# Patient Record
Sex: MALE | Race: WHITE | NOT HISPANIC OR LATINO | Employment: FULL TIME | URBAN - METROPOLITAN AREA
[De-identification: names, ages, dates, MRNs, and addresses within clinical notes are randomized per-mention and may not be internally consistent; named-entity substitution may affect disease eponyms.]

---

## 2017-02-25 ENCOUNTER — HOSPITAL ENCOUNTER (INPATIENT)
Facility: HOSPITAL | Age: 32
LOS: 2 days | Discharge: HOME | End: 2017-02-27
Attending: EMERGENCY MEDICINE | Admitting: INTERNAL MEDICINE

## 2017-02-25 ENCOUNTER — APPOINTMENT (OUTPATIENT)
Dept: GENERAL RADIOLOGY | Facility: HOSPITAL | Age: 32
End: 2017-02-25

## 2017-02-25 ENCOUNTER — APPOINTMENT (OUTPATIENT)
Dept: CT IMAGING | Facility: HOSPITAL | Age: 32
End: 2017-02-25

## 2017-02-25 DIAGNOSIS — J98.59 MEDIASTINAL MASS: ICD-10-CM

## 2017-02-25 DIAGNOSIS — C83.32 DIFFUSE LARGE B-CELL LYMPHOMA OF INTRATHORACIC LYMPH NODES (HCC): ICD-10-CM

## 2017-02-25 DIAGNOSIS — I87.1 SVC SYNDROME: Primary | ICD-10-CM

## 2017-02-25 LAB
ALBUMIN SERPL-MCNC: 4.7 G/DL (ref 3.5–5.2)
ALBUMIN/GLOB SERPL: 1.5 G/DL
ALP SERPL-CCNC: 52 U/L (ref 39–117)
ALT SERPL W P-5'-P-CCNC: 30 U/L (ref 1–41)
ANION GAP SERPL CALCULATED.3IONS-SCNC: 16.7 MMOL/L
AST SERPL-CCNC: 18 U/L (ref 1–40)
BASOPHILS # BLD AUTO: 0.03 10*3/MM3 (ref 0–0.2)
BASOPHILS NFR BLD AUTO: 0.5 % (ref 0–1.5)
BILIRUB SERPL-MCNC: 0.6 MG/DL (ref 0.1–1.2)
BUN BLD-MCNC: 14 MG/DL (ref 6–20)
BUN/CREAT SERPL: 14 (ref 7–25)
CALCIUM SPEC-SCNC: 9.9 MG/DL (ref 8.6–10.5)
CHLORIDE SERPL-SCNC: 100 MMOL/L (ref 98–107)
CO2 SERPL-SCNC: 24.3 MMOL/L (ref 22–29)
CREAT BLD-MCNC: 1 MG/DL (ref 0.76–1.27)
DEPRECATED RDW RBC AUTO: 39.7 FL (ref 37–54)
EOSINOPHIL # BLD AUTO: 0.04 10*3/MM3 (ref 0–0.7)
EOSINOPHIL NFR BLD AUTO: 0.6 % (ref 0.3–6.2)
ERYTHROCYTE [DISTWIDTH] IN BLOOD BY AUTOMATED COUNT: 13.2 % (ref 11.5–14.5)
GFR SERPL CREATININE-BSD FRML MDRD: 87 ML/MIN/1.73
GLOBULIN UR ELPH-MCNC: 3.1 GM/DL
GLUCOSE BLD-MCNC: 84 MG/DL (ref 65–99)
HCT VFR BLD AUTO: 44.8 % (ref 40.4–52.2)
HGB BLD-MCNC: 15.7 G/DL (ref 13.7–17.6)
IMM GRANULOCYTES # BLD: 0.02 10*3/MM3 (ref 0–0.03)
IMM GRANULOCYTES NFR BLD: 0.3 % (ref 0–0.5)
LYMPHOCYTES # BLD AUTO: 1.1 10*3/MM3 (ref 0.9–4.8)
LYMPHOCYTES NFR BLD AUTO: 17.5 % (ref 19.6–45.3)
MCH RBC QN AUTO: 29.4 PG (ref 27–32.7)
MCHC RBC AUTO-ENTMCNC: 35 G/DL (ref 32.6–36.4)
MCV RBC AUTO: 83.9 FL (ref 79.8–96.2)
MONOCYTES # BLD AUTO: 0.39 10*3/MM3 (ref 0.2–1.2)
MONOCYTES NFR BLD AUTO: 6.2 % (ref 5–12)
NEUTROPHILS # BLD AUTO: 4.7 10*3/MM3 (ref 1.9–8.1)
NEUTROPHILS NFR BLD AUTO: 74.9 % (ref 42.7–76)
PLATELET # BLD AUTO: 164 10*3/MM3 (ref 140–500)
PMV BLD AUTO: 9.9 FL (ref 6–12)
POTASSIUM BLD-SCNC: 4 MMOL/L (ref 3.5–5.2)
PROT SERPL-MCNC: 7.8 G/DL (ref 6–8.5)
RBC # BLD AUTO: 5.34 10*6/MM3 (ref 4.6–6)
SODIUM BLD-SCNC: 141 MMOL/L (ref 136–145)
WBC NRBC COR # BLD: 6.28 10*3/MM3 (ref 4.5–10.7)

## 2017-02-25 PROCEDURE — 70491 CT SOFT TISSUE NECK W/DYE: CPT

## 2017-02-25 PROCEDURE — 99283 EMERGENCY DEPT VISIT LOW MDM: CPT

## 2017-02-25 PROCEDURE — 71260 CT THORAX DX C+: CPT

## 2017-02-25 PROCEDURE — 71020 HC CHEST PA AND LATERAL: CPT

## 2017-02-25 PROCEDURE — 80053 COMPREHEN METABOLIC PANEL: CPT | Performed by: EMERGENCY MEDICINE

## 2017-02-25 PROCEDURE — 85025 COMPLETE CBC W/AUTO DIFF WBC: CPT | Performed by: EMERGENCY MEDICINE

## 2017-02-25 PROCEDURE — 36415 COLL VENOUS BLD VENIPUNCTURE: CPT | Performed by: EMERGENCY MEDICINE

## 2017-02-25 PROCEDURE — 0 IOPAMIDOL 61 % SOLUTION: Performed by: EMERGENCY MEDICINE

## 2017-02-25 RX ADMIN — IOPAMIDOL 85 ML: 612 INJECTION, SOLUTION INTRAVENOUS at 23:00

## 2017-02-26 PROBLEM — C83.30 DIFFUSE LARGE B CELL LYMPHOMA (HCC): Status: ACTIVE | Noted: 2017-02-26

## 2017-02-26 LAB
ANION GAP SERPL CALCULATED.3IONS-SCNC: 13.5 MMOL/L
BASOPHILS # BLD AUTO: 0.03 10*3/MM3 (ref 0–0.2)
BASOPHILS NFR BLD AUTO: 0.5 % (ref 0–1.5)
BUN BLD-MCNC: 15 MG/DL (ref 6–20)
BUN/CREAT SERPL: 13.5 (ref 7–25)
CALCIUM SPEC-SCNC: 9.4 MG/DL (ref 8.6–10.5)
CHLORIDE SERPL-SCNC: 101 MMOL/L (ref 98–107)
CO2 SERPL-SCNC: 23.5 MMOL/L (ref 22–29)
CREAT BLD-MCNC: 1.11 MG/DL (ref 0.76–1.27)
CRP SERPL-MCNC: 0.76 MG/DL (ref 0–0.5)
DEPRECATED RDW RBC AUTO: 40.5 FL (ref 37–54)
EOSINOPHIL # BLD AUTO: 0.07 10*3/MM3 (ref 0–0.7)
EOSINOPHIL NFR BLD AUTO: 1.2 % (ref 0.3–6.2)
ERYTHROCYTE [DISTWIDTH] IN BLOOD BY AUTOMATED COUNT: 13.5 % (ref 11.5–14.5)
GFR SERPL CREATININE-BSD FRML MDRD: 77 ML/MIN/1.73
GLUCOSE BLD-MCNC: 91 MG/DL (ref 65–99)
HCT VFR BLD AUTO: 45.4 % (ref 40.4–52.2)
HGB BLD-MCNC: 15.8 G/DL (ref 13.7–17.6)
IMM GRANULOCYTES # BLD: 0.02 10*3/MM3 (ref 0–0.03)
IMM GRANULOCYTES NFR BLD: 0.3 % (ref 0–0.5)
LDH SERPL-CCNC: 192 U/L (ref 135–225)
LYMPHOCYTES # BLD AUTO: 1.48 10*3/MM3 (ref 0.9–4.8)
LYMPHOCYTES NFR BLD AUTO: 24.4 % (ref 19.6–45.3)
MCH RBC QN AUTO: 29.2 PG (ref 27–32.7)
MCHC RBC AUTO-ENTMCNC: 34.8 G/DL (ref 32.6–36.4)
MCV RBC AUTO: 83.8 FL (ref 79.8–96.2)
MONOCYTES # BLD AUTO: 0.45 10*3/MM3 (ref 0.2–1.2)
MONOCYTES NFR BLD AUTO: 7.4 % (ref 5–12)
NEUTROPHILS # BLD AUTO: 4.02 10*3/MM3 (ref 1.9–8.1)
NEUTROPHILS NFR BLD AUTO: 66.2 % (ref 42.7–76)
PLATELET # BLD AUTO: 164 10*3/MM3 (ref 140–500)
PMV BLD AUTO: 9.9 FL (ref 6–12)
POTASSIUM BLD-SCNC: 3.9 MMOL/L (ref 3.5–5.2)
RBC # BLD AUTO: 5.42 10*6/MM3 (ref 4.6–6)
SODIUM BLD-SCNC: 138 MMOL/L (ref 136–145)
URATE SERPL-MCNC: 6.6 MG/DL (ref 3.4–7)
WBC NRBC COR # BLD: 6.07 10*3/MM3 (ref 4.5–10.7)

## 2017-02-26 PROCEDURE — 80048 BASIC METABOLIC PNL TOTAL CA: CPT | Performed by: INTERNAL MEDICINE

## 2017-02-26 PROCEDURE — 99255 IP/OBS CONSLTJ NEW/EST HI 80: CPT | Performed by: INTERNAL MEDICINE

## 2017-02-26 PROCEDURE — 84550 ASSAY OF BLOOD/URIC ACID: CPT | Performed by: INTERNAL MEDICINE

## 2017-02-26 PROCEDURE — 86140 C-REACTIVE PROTEIN: CPT | Performed by: INTERNAL MEDICINE

## 2017-02-26 PROCEDURE — 85025 COMPLETE CBC W/AUTO DIFF WBC: CPT | Performed by: INTERNAL MEDICINE

## 2017-02-26 PROCEDURE — 83615 LACTATE (LD) (LDH) ENZYME: CPT | Performed by: INTERNAL MEDICINE

## 2017-02-26 RX ORDER — MECLIZINE HYDROCHLORIDE 25 MG/1
50 TABLET ORAL 3 TIMES DAILY PRN
Status: DISCONTINUED | OUTPATIENT
Start: 2017-02-26 | End: 2017-02-28 | Stop reason: HOSPADM

## 2017-02-26 RX ORDER — ONDANSETRON 4 MG/1
4 TABLET, ORALLY DISINTEGRATING ORAL EVERY 6 HOURS PRN
Status: DISCONTINUED | OUTPATIENT
Start: 2017-02-26 | End: 2017-02-28 | Stop reason: HOSPADM

## 2017-02-26 RX ORDER — ACETAMINOPHEN 325 MG/1
650 TABLET ORAL EVERY 4 HOURS PRN
Status: DISCONTINUED | OUTPATIENT
Start: 2017-02-26 | End: 2017-02-28 | Stop reason: HOSPADM

## 2017-02-26 RX ORDER — SODIUM CHLORIDE 0.9 % (FLUSH) 0.9 %
1-10 SYRINGE (ML) INJECTION AS NEEDED
Status: DISCONTINUED | OUTPATIENT
Start: 2017-02-26 | End: 2017-02-28 | Stop reason: HOSPADM

## 2017-02-26 RX ORDER — ONDANSETRON 4 MG/1
4 TABLET, FILM COATED ORAL EVERY 6 HOURS PRN
Status: DISCONTINUED | OUTPATIENT
Start: 2017-02-26 | End: 2017-02-28 | Stop reason: HOSPADM

## 2017-02-26 RX ORDER — ONDANSETRON 2 MG/ML
4 INJECTION INTRAMUSCULAR; INTRAVENOUS EVERY 6 HOURS PRN
Status: DISCONTINUED | OUTPATIENT
Start: 2017-02-26 | End: 2017-02-28 | Stop reason: HOSPADM

## 2017-02-26 RX ADMIN — ACETAMINOPHEN 650 MG: 325 TABLET ORAL at 20:00

## 2017-02-26 RX ADMIN — ACETAMINOPHEN 650 MG: 325 TABLET ORAL at 10:11

## 2017-02-27 ENCOUNTER — APPOINTMENT (OUTPATIENT)
Dept: CT IMAGING | Facility: HOSPITAL | Age: 32
End: 2017-02-27

## 2017-02-27 ENCOUNTER — TELEPHONE (OUTPATIENT)
Dept: ONCOLOGY | Facility: CLINIC | Age: 32
End: 2017-02-27

## 2017-02-27 ENCOUNTER — ANESTHESIA (OUTPATIENT)
Dept: PERIOP | Facility: HOSPITAL | Age: 32
End: 2017-02-27

## 2017-02-27 ENCOUNTER — ANESTHESIA EVENT (OUTPATIENT)
Dept: PERIOP | Facility: HOSPITAL | Age: 32
End: 2017-02-27

## 2017-02-27 VITALS
TEMPERATURE: 98 F | RESPIRATION RATE: 18 BRPM | OXYGEN SATURATION: 95 % | HEART RATE: 101 BPM | WEIGHT: 232 LBS | SYSTOLIC BLOOD PRESSURE: 120 MMHG | HEIGHT: 72 IN | BODY MASS INDEX: 31.42 KG/M2 | DIASTOLIC BLOOD PRESSURE: 78 MMHG

## 2017-02-27 PROCEDURE — 25010000002 ONDANSETRON PER 1 MG: Performed by: NURSE ANESTHETIST, CERTIFIED REGISTERED

## 2017-02-27 PROCEDURE — 88342 IMHCHEM/IMCYTCHM 1ST ANTB: CPT

## 2017-02-27 PROCEDURE — 0 IOPAMIDOL 61 % SOLUTION: Performed by: HOSPITALIST

## 2017-02-27 PROCEDURE — 88184 FLOWCYTOMETRY/ TC 1 MARKER: CPT

## 2017-02-27 PROCEDURE — 88189 FLOWCYTOMETRY/READ 16 & >: CPT

## 2017-02-27 PROCEDURE — 88341 IMHCHEM/IMCYTCHM EA ADD ANTB: CPT

## 2017-02-27 PROCEDURE — 07B23ZX EXCISION OF LEFT NECK LYMPHATIC, PERCUTANEOUS APPROACH, DIAGNOSTIC: ICD-10-PCS | Performed by: SURGERY

## 2017-02-27 PROCEDURE — 74177 CT ABD & PELVIS W/CONTRAST: CPT

## 2017-02-27 PROCEDURE — 99232 SBSQ HOSP IP/OBS MODERATE 35: CPT | Performed by: INTERNAL MEDICINE

## 2017-02-27 PROCEDURE — 25010000002 MIDAZOLAM PER 1 MG: Performed by: ANESTHESIOLOGY

## 2017-02-27 PROCEDURE — 25010000002 PHENYLEPHRINE PER 1 ML: Performed by: NURSE ANESTHETIST, CERTIFIED REGISTERED

## 2017-02-27 PROCEDURE — 88305 TISSUE EXAM BY PATHOLOGIST: CPT | Performed by: SURGERY

## 2017-02-27 PROCEDURE — 88185 FLOWCYTOMETRY/TC ADD-ON: CPT

## 2017-02-27 PROCEDURE — 25010000002 SUCCINYLCHOLINE PER 20 MG: Performed by: NURSE ANESTHETIST, CERTIFIED REGISTERED

## 2017-02-27 PROCEDURE — 99254 IP/OBS CNSLTJ NEW/EST MOD 60: CPT | Performed by: SURGERY

## 2017-02-27 PROCEDURE — 25010000002 PROPOFOL 10 MG/ML EMULSION: Performed by: NURSE ANESTHETIST, CERTIFIED REGISTERED

## 2017-02-27 PROCEDURE — 38510 BIOPSY/REMOVAL LYMPH NODES: CPT | Performed by: SURGERY

## 2017-02-27 PROCEDURE — 25510000001 DIATRIZOATE MEGLUMINE & SODIUM PER 1 ML: Performed by: HOSPITALIST

## 2017-02-27 PROCEDURE — 25010000003 CEFAZOLIN IN DEXTROSE 2-4 GM/100ML-% SOLUTION: Performed by: SURGERY

## 2017-02-27 PROCEDURE — 25010000002 FENTANYL CITRATE (PF) 100 MCG/2ML SOLUTION: Performed by: NURSE ANESTHETIST, CERTIFIED REGISTERED

## 2017-02-27 PROCEDURE — 25010000002 DEXAMETHASONE PER 1 MG: Performed by: NURSE ANESTHETIST, CERTIFIED REGISTERED

## 2017-02-27 RX ORDER — FLUMAZENIL 0.1 MG/ML
0.2 INJECTION INTRAVENOUS AS NEEDED
Status: DISCONTINUED | OUTPATIENT
Start: 2017-02-27 | End: 2017-02-27

## 2017-02-27 RX ORDER — DEXAMETHASONE SODIUM PHOSPHATE 10 MG/ML
INJECTION INTRAMUSCULAR; INTRAVENOUS AS NEEDED
Status: DISCONTINUED | OUTPATIENT
Start: 2017-02-27 | End: 2017-02-27 | Stop reason: SURG

## 2017-02-27 RX ORDER — HYDRALAZINE HYDROCHLORIDE 20 MG/ML
5 INJECTION INTRAMUSCULAR; INTRAVENOUS
Status: DISCONTINUED | OUTPATIENT
Start: 2017-02-27 | End: 2017-02-27

## 2017-02-27 RX ORDER — LABETALOL HYDROCHLORIDE 5 MG/ML
5 INJECTION, SOLUTION INTRAVENOUS
Status: DISCONTINUED | OUTPATIENT
Start: 2017-02-27 | End: 2017-02-27

## 2017-02-27 RX ORDER — PROMETHAZINE HYDROCHLORIDE 25 MG/1
25 TABLET ORAL ONCE AS NEEDED
Status: DISCONTINUED | OUTPATIENT
Start: 2017-02-27 | End: 2017-02-27

## 2017-02-27 RX ORDER — FENTANYL CITRATE 50 UG/ML
INJECTION, SOLUTION INTRAMUSCULAR; INTRAVENOUS AS NEEDED
Status: DISCONTINUED | OUTPATIENT
Start: 2017-02-27 | End: 2017-02-27 | Stop reason: SURG

## 2017-02-27 RX ORDER — PROMETHAZINE HYDROCHLORIDE 25 MG/1
25 TABLET ORAL EVERY 6 HOURS PRN
Qty: 30 TABLET | Refills: 0 | OUTPATIENT
Start: 2017-02-27 | End: 2017-06-27

## 2017-02-27 RX ORDER — CEFAZOLIN SODIUM 2 G/100ML
2 INJECTION, SOLUTION INTRAVENOUS ONCE
Status: COMPLETED | OUTPATIENT
Start: 2017-02-27 | End: 2017-02-27

## 2017-02-27 RX ORDER — OXYCODONE AND ACETAMINOPHEN 7.5; 325 MG/1; MG/1
1 TABLET ORAL ONCE AS NEEDED
Status: DISCONTINUED | OUTPATIENT
Start: 2017-02-27 | End: 2017-02-27

## 2017-02-27 RX ORDER — PROMETHAZINE HYDROCHLORIDE 25 MG/1
25 SUPPOSITORY RECTAL ONCE AS NEEDED
Status: DISCONTINUED | OUTPATIENT
Start: 2017-02-27 | End: 2017-02-27

## 2017-02-27 RX ORDER — SUCCINYLCHOLINE CHLORIDE 20 MG/ML
INJECTION INTRAMUSCULAR; INTRAVENOUS AS NEEDED
Status: DISCONTINUED | OUTPATIENT
Start: 2017-02-27 | End: 2017-02-27 | Stop reason: SURG

## 2017-02-27 RX ORDER — PROMETHAZINE HYDROCHLORIDE 25 MG/ML
12.5 INJECTION, SOLUTION INTRAMUSCULAR; INTRAVENOUS ONCE AS NEEDED
Status: DISCONTINUED | OUTPATIENT
Start: 2017-02-27 | End: 2017-02-27

## 2017-02-27 RX ORDER — SODIUM CHLORIDE 0.9 % (FLUSH) 0.9 %
1-10 SYRINGE (ML) INJECTION AS NEEDED
Status: DISCONTINUED | OUTPATIENT
Start: 2017-02-27 | End: 2017-02-27 | Stop reason: HOSPADM

## 2017-02-27 RX ORDER — BUPIVACAINE HYDROCHLORIDE AND EPINEPHRINE 2.5; 5 MG/ML; UG/ML
INJECTION, SOLUTION INFILTRATION; PERINEURAL AS NEEDED
Status: DISCONTINUED | OUTPATIENT
Start: 2017-02-27 | End: 2017-02-27 | Stop reason: HOSPADM

## 2017-02-27 RX ORDER — ONDANSETRON 2 MG/ML
INJECTION INTRAMUSCULAR; INTRAVENOUS AS NEEDED
Status: DISCONTINUED | OUTPATIENT
Start: 2017-02-27 | End: 2017-02-27 | Stop reason: SURG

## 2017-02-27 RX ORDER — SODIUM CHLORIDE, SODIUM LACTATE, POTASSIUM CHLORIDE, CALCIUM CHLORIDE 600; 310; 30; 20 MG/100ML; MG/100ML; MG/100ML; MG/100ML
9 INJECTION, SOLUTION INTRAVENOUS CONTINUOUS
Status: DISCONTINUED | OUTPATIENT
Start: 2017-02-27 | End: 2017-02-28 | Stop reason: HOSPADM

## 2017-02-27 RX ORDER — PROPOFOL 10 MG/ML
VIAL (ML) INTRAVENOUS AS NEEDED
Status: DISCONTINUED | OUTPATIENT
Start: 2017-02-27 | End: 2017-02-27 | Stop reason: SURG

## 2017-02-27 RX ORDER — HYDROMORPHONE HYDROCHLORIDE 1 MG/ML
0.5 INJECTION, SOLUTION INTRAMUSCULAR; INTRAVENOUS; SUBCUTANEOUS
Status: DISCONTINUED | OUTPATIENT
Start: 2017-02-27 | End: 2017-02-27

## 2017-02-27 RX ORDER — FENTANYL CITRATE 50 UG/ML
50 INJECTION, SOLUTION INTRAMUSCULAR; INTRAVENOUS
Status: DISCONTINUED | OUTPATIENT
Start: 2017-02-27 | End: 2017-02-27 | Stop reason: HOSPADM

## 2017-02-27 RX ORDER — NALOXONE HCL 0.4 MG/ML
0.2 VIAL (ML) INJECTION AS NEEDED
Status: DISCONTINUED | OUTPATIENT
Start: 2017-02-27 | End: 2017-02-27

## 2017-02-27 RX ORDER — LIDOCAINE HYDROCHLORIDE 20 MG/ML
INJECTION, SOLUTION INFILTRATION; PERINEURAL AS NEEDED
Status: DISCONTINUED | OUTPATIENT
Start: 2017-02-27 | End: 2017-02-27 | Stop reason: SURG

## 2017-02-27 RX ORDER — PROMETHAZINE HYDROCHLORIDE 25 MG/1
12.5 TABLET ORAL ONCE AS NEEDED
Status: DISCONTINUED | OUTPATIENT
Start: 2017-02-27 | End: 2017-02-27

## 2017-02-27 RX ORDER — DIPHENHYDRAMINE HYDROCHLORIDE 50 MG/ML
12.5 INJECTION INTRAMUSCULAR; INTRAVENOUS
Status: DISCONTINUED | OUTPATIENT
Start: 2017-02-27 | End: 2017-02-27

## 2017-02-27 RX ORDER — HYDROCODONE BITARTRATE AND ACETAMINOPHEN 7.5; 325 MG/1; MG/1
1 TABLET ORAL ONCE AS NEEDED
Status: DISCONTINUED | OUTPATIENT
Start: 2017-02-27 | End: 2017-02-27

## 2017-02-27 RX ORDER — MIDAZOLAM HYDROCHLORIDE 1 MG/ML
1 INJECTION INTRAMUSCULAR; INTRAVENOUS
Status: DISCONTINUED | OUTPATIENT
Start: 2017-02-27 | End: 2017-02-27 | Stop reason: HOSPADM

## 2017-02-27 RX ORDER — FENTANYL CITRATE 50 UG/ML
50 INJECTION, SOLUTION INTRAMUSCULAR; INTRAVENOUS
Status: DISCONTINUED | OUTPATIENT
Start: 2017-02-27 | End: 2017-02-27

## 2017-02-27 RX ORDER — FAMOTIDINE 10 MG/ML
20 INJECTION, SOLUTION INTRAVENOUS ONCE
Status: COMPLETED | OUTPATIENT
Start: 2017-02-27 | End: 2017-02-27

## 2017-02-27 RX ORDER — ONDANSETRON 2 MG/ML
4 INJECTION INTRAMUSCULAR; INTRAVENOUS ONCE AS NEEDED
Status: DISCONTINUED | OUTPATIENT
Start: 2017-02-27 | End: 2017-02-27

## 2017-02-27 RX ORDER — GLYCOPYRROLATE 0.2 MG/ML
INJECTION INTRAMUSCULAR; INTRAVENOUS AS NEEDED
Status: DISCONTINUED | OUTPATIENT
Start: 2017-02-27 | End: 2017-02-27 | Stop reason: SURG

## 2017-02-27 RX ORDER — HYDROMORPHONE HYDROCHLORIDE 1 MG/ML
0.25 INJECTION, SOLUTION INTRAMUSCULAR; INTRAVENOUS; SUBCUTANEOUS
Status: DISCONTINUED | OUTPATIENT
Start: 2017-02-27 | End: 2017-02-27

## 2017-02-27 RX ORDER — MIDAZOLAM HYDROCHLORIDE 1 MG/ML
2 INJECTION INTRAMUSCULAR; INTRAVENOUS
Status: DISCONTINUED | OUTPATIENT
Start: 2017-02-27 | End: 2017-02-27 | Stop reason: HOSPADM

## 2017-02-27 RX ADMIN — EPHEDRINE SULFATE 5 MG: 50 INJECTION INTRAMUSCULAR; INTRAVENOUS; SUBCUTANEOUS at 10:41

## 2017-02-27 RX ADMIN — ACETAMINOPHEN 650 MG: 325 TABLET ORAL at 20:48

## 2017-02-27 RX ADMIN — MIDAZOLAM 2 MG: 1 INJECTION INTRAMUSCULAR; INTRAVENOUS at 10:00

## 2017-02-27 RX ADMIN — SUCCINYLCHOLINE CHLORIDE 200 MG: 20 INJECTION, SOLUTION INTRAMUSCULAR; INTRAVENOUS; PARENTERAL at 10:13

## 2017-02-27 RX ADMIN — SODIUM CHLORIDE, POTASSIUM CHLORIDE, SODIUM LACTATE AND CALCIUM CHLORIDE 9 ML/HR: 600; 310; 30; 20 INJECTION, SOLUTION INTRAVENOUS at 09:10

## 2017-02-27 RX ADMIN — CEFAZOLIN SODIUM 2 G: 2 INJECTION, SOLUTION INTRAVENOUS at 10:11

## 2017-02-27 RX ADMIN — ACETAMINOPHEN 650 MG: 325 TABLET ORAL at 13:42

## 2017-02-27 RX ADMIN — MIDAZOLAM 2 MG: 1 INJECTION INTRAMUSCULAR; INTRAVENOUS at 09:33

## 2017-02-27 RX ADMIN — DEXAMETHASONE SODIUM PHOSPHATE 8 MG: 10 INJECTION INTRAMUSCULAR; INTRAVENOUS at 10:20

## 2017-02-27 RX ADMIN — IOPAMIDOL 85 ML: 612 INJECTION, SOLUTION INTRAVENOUS at 20:30

## 2017-02-27 RX ADMIN — FENTANYL CITRATE 50 MCG: 50 INJECTION INTRAMUSCULAR; INTRAVENOUS at 10:13

## 2017-02-27 RX ADMIN — PHENYLEPHRINE HYDROCHLORIDE 100 MCG: 10 INJECTION INTRAVENOUS at 10:38

## 2017-02-27 RX ADMIN — PHENYLEPHRINE HYDROCHLORIDE 100 MCG: 10 INJECTION INTRAVENOUS at 10:29

## 2017-02-27 RX ADMIN — GLYCOPYRROLATE 0.2 MG: 0.2 INJECTION INTRAMUSCULAR; INTRAVENOUS at 10:49

## 2017-02-27 RX ADMIN — EPHEDRINE SULFATE 10 MG: 50 INJECTION INTRAMUSCULAR; INTRAVENOUS; SUBCUTANEOUS at 10:45

## 2017-02-27 RX ADMIN — FAMOTIDINE 20 MG: 10 INJECTION, SOLUTION INTRAVENOUS at 09:33

## 2017-02-27 RX ADMIN — LIDOCAINE HYDROCHLORIDE 100 MG: 20 INJECTION, SOLUTION INFILTRATION; PERINEURAL at 10:13

## 2017-02-27 RX ADMIN — PROPOFOL 200 MG: 10 INJECTION, EMULSION INTRAVENOUS at 10:13

## 2017-02-27 RX ADMIN — ONDANSETRON 4 MG: 2 INJECTION INTRAMUSCULAR; INTRAVENOUS at 10:51

## 2017-02-27 RX ADMIN — PHENYLEPHRINE HYDROCHLORIDE 100 MCG: 10 INJECTION INTRAVENOUS at 10:23

## 2017-02-27 RX ADMIN — FENTANYL CITRATE 50 MCG: 50 INJECTION INTRAMUSCULAR; INTRAVENOUS at 11:13

## 2017-02-27 RX ADMIN — PHENYLEPHRINE HYDROCHLORIDE 100 MCG: 10 INJECTION INTRAVENOUS at 10:41

## 2017-02-27 RX ADMIN — PHENYLEPHRINE HYDROCHLORIDE 100 MCG: 10 INJECTION INTRAVENOUS at 10:26

## 2017-02-27 RX ADMIN — EPHEDRINE SULFATE 10 MG: 50 INJECTION INTRAMUSCULAR; INTRAVENOUS; SUBCUTANEOUS at 10:51

## 2017-02-27 RX ADMIN — DIATRIZOATE MEGLUMINE AND DIATRIZOATE SODIUM 30 ML: 600; 100 SOLUTION ORAL; RECTAL at 17:52

## 2017-02-27 NOTE — TELEPHONE ENCOUNTER
----- Message from Ailyn Georges MD sent at 2/27/2017  4:28 PM EST -----  Please schedule follow-up with [facility] unit appointment on 3/6/2017 at 8 AM with CBC, CMP, LDH.  Please make sure patient has PET scan scheduled prior to his follow-up with me.    Ailyn Georges MD

## 2017-02-27 NOTE — ANESTHESIA PROCEDURE NOTES
Airway  Urgency: elective    Date/Time: 2/27/2017 10:16 AM  Airway not difficult    General Information and Staff    Patient location during procedure: OR  Anesthesiologist: EARLINE HANCOCK  CRNA: AZALIA STEVENSON    Indications and Patient Condition  Indications for airway management: airway protection    Preoxygenated: yes  MILS maintained throughout  Mask difficulty assessment: 2 - vent by mask + OA or adjuvant +/- NMBA    Final Airway Details  Final airway type: endotracheal airway      Successful airway: ETT  Cuffed: yes   Successful intubation technique: direct laryngoscopy  Facilitating devices/methods: intubating stylet  Endotracheal tube insertion site: oral  Blade: Hay  Blade size: #4  ETT size: 7.5 mm  Cormack-Lehane Classification: grade I - full view of glottis  Placement verified by: chest auscultation and capnometry   Cuff volume (mL): 8  Measured from: lips  ETT to lips (cm): 22    Additional Comments  Patient in OR. Monitors on. BLVS. Pre 02 100%. SIVI. DL x1. DVVC. Atraumatic placement of ETT. Placement verified with ETC02 and BBS. ETT secured. Teeth/lips in pre-op condition.

## 2017-02-27 NOTE — ANESTHESIA POSTPROCEDURE EVALUATION
Patient: Scott Allen    Procedure Summary     Date Anesthesia Start Anesthesia Stop Room / Location    02/27/17 1009 1116  [facility] / [facility]       Procedure Diagnosis Surgeon Provider    Excisional biopsy of left occipital lymph node (Left ) Diffuse large B-cell lymphoma of intrathoracic lymph nodes  (Diffuse large B-cell lymphoma of intrathoracic lymph nodes [C83.32]) MD Paco Goldstein MD          Anesthesia Type: general  Last vitals  /74 (02/27/17 1130)    Temp 36.5 °C (97.7 °F) (02/27/17 1113)    Pulse 82 (02/27/17 1130)   Resp 14 (02/27/17 1130)    SpO2 100 % (02/27/17 1130)      Post Anesthesia Care and Evaluation    Patient location during evaluation: PACU  Patient participation: complete - patient participated  Level of consciousness: sleepy but conscious  Pain score: 0  Pain management: adequate  Airway patency: patent  Anesthetic complications: No anesthetic complications    Cardiovascular status: acceptable  Respiratory status: acceptable  Hydration status: acceptable

## 2017-02-27 NOTE — ANESTHESIA PREPROCEDURE EVALUATION
Anesthesia Evaluation     Patient summary reviewed and Nursing notes reviewed   no history of anesthetic complications:  NPO Status: > 8 hours   Airway   Mallampati: III  TM distance: <3 FB  Neck ROM: full  Dental - normal exam     Pulmonary - negative pulmonary ROS and normal exam   Cardiovascular - negative cardio ROS and normal exam  Exercise tolerance: good (4-7 METS)        Neuro/Psych  (+) TIA,    GI/Hepatic/Renal/Endo - negative ROS     Musculoskeletal (-) negative ROS    Abdominal   (+) obese,     Bowel sounds: normal.   Substance History - negative use     OB/GYN negative ob/gyn ROS         Other          Other Comment: SVC syndrome  Lymphoma                                Anesthesia Plan    ASA 3     general     intravenous induction   Anesthetic plan and risks discussed with patient.

## 2017-02-28 DIAGNOSIS — C83.31 DIFFUSE LARGE B-CELL LYMPHOMA OF LYMPH NODES OF NECK (HCC): Primary | ICD-10-CM

## 2017-02-28 LAB — REF LAB TEST METHOD: NORMAL

## 2017-03-02 ENCOUNTER — HOSPITAL ENCOUNTER (OUTPATIENT)
Dept: PET IMAGING | Facility: HOSPITAL | Age: 32
Discharge: HOME | End: 2017-03-02
Attending: INTERNAL MEDICINE | Admitting: INTERNAL MEDICINE

## 2017-03-02 ENCOUNTER — HOSPITAL ENCOUNTER (OUTPATIENT)
Dept: PET IMAGING | Facility: HOSPITAL | Age: 32
Discharge: HOME | End: 2017-03-02
Attending: INTERNAL MEDICINE

## 2017-03-02 DIAGNOSIS — C83.31 DIFFUSE LARGE B-CELL LYMPHOMA OF LYMPH NODES OF NECK (HCC): ICD-10-CM

## 2017-03-02 LAB
CYTO UR: NORMAL
LAB AP CASE REPORT: NORMAL
Lab: NORMAL
PATH REPORT.ADDENDUM SPEC: NORMAL
PATH REPORT.FINAL DX SPEC: NORMAL
PATH REPORT.GROSS SPEC: NORMAL

## 2017-03-02 PROCEDURE — A9552 F18 FDG: HCPCS | Performed by: INTERNAL MEDICINE

## 2017-03-02 PROCEDURE — 78815 PET IMAGE W/CT SKULL-THIGH: CPT

## 2017-03-02 PROCEDURE — 0 FLUDEOXYGLUCOSE F18 SOLUTION: Performed by: INTERNAL MEDICINE

## 2017-03-02 RX ADMIN — FLUDEOXYGLUCOSE F18 1 DOSE: 300 INJECTION INTRAVENOUS at 07:43

## 2017-03-06 ENCOUNTER — OFFICE VISIT (OUTPATIENT)
Dept: ONCOLOGY | Facility: CLINIC | Age: 32
End: 2017-03-06

## 2017-03-06 ENCOUNTER — LAB (OUTPATIENT)
Dept: LAB | Facility: HOSPITAL | Age: 32
End: 2017-03-06

## 2017-03-06 VITALS
SYSTOLIC BLOOD PRESSURE: 134 MMHG | HEART RATE: 95 BPM | HEIGHT: 70 IN | RESPIRATION RATE: 16 BRPM | DIASTOLIC BLOOD PRESSURE: 78 MMHG | BODY MASS INDEX: 32.16 KG/M2 | WEIGHT: 224.6 LBS | OXYGEN SATURATION: 98 % | TEMPERATURE: 98.1 F

## 2017-03-06 DIAGNOSIS — I87.1 SVC SYNDROME: ICD-10-CM

## 2017-03-06 DIAGNOSIS — C83.30 DIFFUSE LARGE B-CELL LYMPHOMA, UNSPECIFIED BODY REGION (HCC): Primary | ICD-10-CM

## 2017-03-06 DIAGNOSIS — C83.33 DIFFUSE LARGE B-CELL LYMPHOMA OF INTRA-ABDOMINAL LYMPH NODES (HCC): Primary | ICD-10-CM

## 2017-03-06 LAB
ALBUMIN SERPL-MCNC: 4.5 G/DL (ref 3.5–5.2)
ALBUMIN/GLOB SERPL: 1.6 G/DL (ref 1.1–2.4)
ALP SERPL-CCNC: 51 U/L (ref 38–116)
ALT SERPL W P-5'-P-CCNC: 21 U/L (ref 0–41)
ANION GAP SERPL CALCULATED.3IONS-SCNC: 10.9 MMOL/L
AST SERPL-CCNC: 17 U/L (ref 0–40)
BASOPHILS # BLD AUTO: 0.06 10*3/MM3 (ref 0–0.1)
BASOPHILS NFR BLD AUTO: 1.3 % (ref 0–1.1)
BILIRUB SERPL-MCNC: 0.7 MG/DL (ref 0.1–1.2)
BUN BLD-MCNC: 16 MG/DL (ref 6–20)
BUN/CREAT SERPL: 15 (ref 7.3–30)
CALCIUM SPEC-SCNC: 9.2 MG/DL (ref 8.5–10.2)
CHLORIDE SERPL-SCNC: 102 MMOL/L (ref 98–107)
CO2 SERPL-SCNC: 26.1 MMOL/L (ref 22–29)
CREAT BLD-MCNC: 1.07 MG/DL (ref 0.7–1.3)
DEPRECATED RDW RBC AUTO: 39.4 FL (ref 37–49)
EOSINOPHIL # BLD AUTO: 0.19 10*3/MM3 (ref 0–0.36)
EOSINOPHIL NFR BLD AUTO: 4.1 % (ref 1–5)
ERYTHROCYTE [DISTWIDTH] IN BLOOD BY AUTOMATED COUNT: 13 % (ref 11.7–14.5)
GFR SERPL CREATININE-BSD FRML MDRD: 80 ML/MIN/1.73
GLOBULIN UR ELPH-MCNC: 2.8 GM/DL (ref 1.8–3.5)
GLUCOSE BLD-MCNC: 105 MG/DL (ref 74–124)
HCT VFR BLD AUTO: 45.8 % (ref 40–49)
HGB BLD-MCNC: 15.8 G/DL (ref 13.5–16.5)
HGB RETIC QN: 35.4 PG (ref 29.8–36.1)
IMM GRANULOCYTES # BLD: 0.03 10*3/MM3 (ref 0–0.03)
IMM GRANULOCYTES NFR BLD: 0.6 % (ref 0–0.5)
IMM RETICS NFR: 7.2 % (ref 3–15.8)
LDH SERPL-CCNC: 175 U/L (ref 99–259)
LYMPHOCYTES # BLD AUTO: 1.26 10*3/MM3 (ref 1–3.5)
LYMPHOCYTES NFR BLD AUTO: 27.2 % (ref 20–49)
MCH RBC QN AUTO: 28.9 PG (ref 27–33)
MCHC RBC AUTO-ENTMCNC: 34.5 G/DL (ref 32–35)
MCV RBC AUTO: 83.7 FL (ref 83–97)
MONOCYTES # BLD AUTO: 0.32 10*3/MM3 (ref 0.25–0.8)
MONOCYTES NFR BLD AUTO: 6.9 % (ref 4–12)
NEUTROPHILS # BLD AUTO: 2.77 10*3/MM3 (ref 1.5–7)
NEUTROPHILS NFR BLD AUTO: 59.9 % (ref 39–75)
NRBC BLD MANUAL-RTO: 0 /100 WBC (ref 0–0)
PLATELET # BLD AUTO: 157 10*3/MM3 (ref 150–375)
PMV BLD AUTO: 9.2 FL (ref 8.9–12.1)
POTASSIUM BLD-SCNC: 4.2 MMOL/L (ref 3.5–4.7)
PROT SERPL-MCNC: 7.3 G/DL (ref 6.3–8)
RBC # BLD AUTO: 5.47 10*6/MM3 (ref 4.3–5.5)
RETICS/RBC NFR AUTO: 1.41 % (ref 0.6–2)
SODIUM BLD-SCNC: 139 MMOL/L (ref 134–145)
WBC NRBC COR # BLD: 4.63 10*3/MM3 (ref 4–10)

## 2017-03-06 PROCEDURE — 85025 COMPLETE CBC W/AUTO DIFF WBC: CPT | Performed by: INTERNAL MEDICINE

## 2017-03-06 PROCEDURE — 83615 LACTATE (LD) (LDH) ENZYME: CPT | Performed by: INTERNAL MEDICINE

## 2017-03-06 PROCEDURE — 99215 OFFICE O/P EST HI 40 MIN: CPT | Performed by: INTERNAL MEDICINE

## 2017-03-06 PROCEDURE — 80053 COMPREHEN METABOLIC PANEL: CPT | Performed by: INTERNAL MEDICINE

## 2017-03-06 PROCEDURE — 36415 COLL VENOUS BLD VENIPUNCTURE: CPT | Performed by: INTERNAL MEDICINE

## 2017-03-06 PROCEDURE — 85046 RETICYTE/HGB CONCENTRATE: CPT | Performed by: INTERNAL MEDICINE

## 2017-03-07 NOTE — PROGRESS NOTES
Subjective .     REASONS FOR FOLLOWUP:   1. Diffuse large B-cell lymphoma diagnosed in 2011 status post chemotherapy with 8 cycles of CHOP Rituxan with complete remission.     2.  History of upper extremity DVTs for which she was on anticoagulation but it causes severe epistaxis. He does not tolerate aspirin either and hence not on anti-correlation.     3.  He has had bilateral pleural effusions for which she has had several thoracocentesis in the past.     4.  Status post excisional biopsy of left occipital lymph node. Pathology pending     HISTORY OF PRESENT ILLNESS:  The patient is a 32 y.o. year old male who is here for follow-up with the above-mentioned history.    History of Present Illness   patient is a 32-year-old gentleman with history of diffuse large B-cell lymphoma status post CHOP Rituxan followed by Dr. Conner at [facility], recently got admitted to the hospital because of shortness of breath.  He was found to have a CT scan showing of increase in the mediastinal mass from 4.3 cm to 5.8 in to 6.3 cm.  He also had occipital node which underwent he underwent excisional biopsy unfortunately the pathology is negative on that.  He underwent a PET scan which showed mild uptake with SUV of 3-4 in the mediastinal mass with a very low uptake in the cervical lymph node.  He will need tissue diagnosis given the fact that he has symptoms of shortness of breath and occasional night sweats.  There has been no comparison between the present CT scan and the CT from 2015 that was done at [facility].    Past Medical History   Diagnosis Date   • Lymphoma    • Superior vena cava syndrome    • TIA (transient ischemic attack)        ONCOLOGIC HISTORY:  (History from previous dates can be found in the separate document.)  32-year-old white male who has a remote history of non-Hodgkin's lymphoma, diffuse large cell from the mediastinum that was originally diagnosed and treated at [facility] 4 years ago. He presented with superior vena cava syndrome and a mass that was occluding the superior vena cava. He had a [place] procedure to make the diagnosis and he ended up with pleural effusion, chest tubes in place and finally chemotherapy with 8 cycles of CHOP/Rituxan and subsequently radiation therapy to the mediastinum. He had issues in regard to superior vena cava syndrome on a chronic basis that unfortunately has not had any proper resolution because there is no resolution for the kind of circumstance that he has anyway. He has taken anticoagulants before because of previous thrombosis in the upper extremities veins, but he is not taking this anymore because of frequent epistaxis. In any event, for the last 2 weeks the patient has had nocturnal sweats and he has felt a left occipital lymph node coming and going. He has not had any alterations in appetite or weight, he has not had any fever and he has not had any rashes in the skin. He has not had any modification in cough, sputum production or shortness of breath, no pain in the chest, normal bowel activity, normal urination, no alteration in the penis, no alteration in the testicles and no alterations in the skin and no neurological dysfunction. He denies any other symptomatology related to lymphoma. The last time he was seen by the Hematology/Oncology Team at [facility] was a year ago on clinical grounds with no CT scans.       He was admitted yesterday due to symptoms as above and a CT scan documented a retrosternal mass, precardiac mass; we do not know if this a chronic or an acute new issue     Ct scan: 2/25/2070     There is a 5.8 x 3.2 x 6.3 cm (this mass was previously measured at 4.8  x 2.7 cm on the study of 1/9/2014) anterior mediastinal mass within the  superior mediastinum just behind the sternum predominantly to the right  of midline which posteriorly abuts the ascending aorta and the aortic  arch  and causes complete occlusion of the upper portion of the superior  vena cava, the superior vena cava reconstitutes at the level of entry of  the azygos vein.       2.1 cm new right supraclavicular lymph node was not mentioned on the  previous study of 1/9/2014. Prominent collateral veins are seen  extending from the internal mammary veins and right subclavian vein into  the anterior chest wall predominantly to the right of midline.      No consolidation or effusion. Pleural-based 0.5 cm nodule in the right  middle lobe.      Upper abdominal viscera are essentially unremarkable.     CT abdomen pelvis 2/27 negative  PET scan 3/2/17  IMPRESSION:  1. Infiltrative anterior mediastinal lymphadenopathy has low-level  activity. There is otherwise no hypermetabolic lymphadenopathy.  2. Low level activity at the surgical bed at the posterior subcutaneous  soft tissues of the neck on the left.    Path on occipital to lymph node is negative      No current facility-administered medications on file prior to encounter.        Current Outpatient Prescriptions on File Prior to Visit   Medication Sig Dispense Refill   • meclizine (ANTIVERT) 50 MG tablet Take 0.5 tablets by mouth 3 (three) times a day as needed for dizziness for up to 20 doses. 20 tablet 0   • promethazine (PHENERGAN) 25 MG tablet Take 1 tablet by mouth Every 6 (Six) Hours As Needed for nausea or vomiting. 30 tablet 0     No current facility-administered medications on file prior to visit.        ALLERGIES:   No Known Allergies    Social History     Social History   • Marital status: Single     Spouse name: N/A   • Number of children: N/A   • Years of education: N/A     Occupational History   •  Other [employer]     Social History Main Topics   • Smoking status: Never Smoker   • Smokeless tobacco: Not on file   • Alcohol use Yes   • Drug use: No   • Sexual activity: Not on file     Other Topics Concern   • Not on file     Social History Narrative    Pt states  "hx of attempted stent placement and internal bypass, states both surgeries unsuccessful.         Cancer-related family history is not on file.     Review of Systems  A comprehensive 14 point review of systems was performed and was negative except as mentioned.    Objective      Vitals:    03/06/17 0751   BP: 134/78   Pulse: 95   Resp: 16   Temp: 98.1 °F (36.7 °C)   TempSrc: Oral   SpO2: 98%   Weight: 224 lb 9.6 oz (102 kg)   Height: 70.47\" (179 cm)   PainSc: 0-No pain     Current Status 3/6/2017   ECOG score 0       Physical Exam    GENERAL:  Well-developed, well-nourished in no acute distress.   SKIN:  Warm, dry without rashes, purpura or petechiae.  EYES:  Pupils equal, round and reactive to light.  EOMs intact.  Conjunctivae normal.  EARS:  Hearing intact.  NOSE:  Septum midline.  No excoriations or nasal discharge.  MOUTH:  Tongue is well-papillated; no stomatitis or ulcers.  Lips normal.  THROAT:  Oropharynx without lesions or exudates.  NECK:  Supple with good range of motion; no thyromegaly or masses, no JVD.  LYMPHATICS:  No cervical, supraclavicular, axillary or inguinal adenopathy.  CHEST:  Lungs clear to auscultation. Good airflow.  CARDIAC:  Regular rate and rhythm without murmurs, rubs or gallops. Normal S1,S2.  ABDOMEN:  Soft, nontender with no hepatosplenomegaly or masses.  EXTREMITIES:  No clubbing, cyanosis or edema.  NEUROLOGICAL:  Cranial Nerves II-XII grossly intact.  No focal neurological deficits.  PSYCHIATRIC:  Normal affect and mood.        RECENT LABS:  Hematology WBC   Date Value Ref Range Status   03/06/2017 4.63 4.00 - 10.00 10*3/mm3 Final     RBC   Date Value Ref Range Status   03/06/2017 5.47 4.30 - 5.50 10*6/mm3 Final     HEMOGLOBIN   Date Value Ref Range Status   03/06/2017 15.8 13.5 - 16.5 g/dL Final     HEMATOCRIT   Date Value Ref Range Status   03/06/2017 45.8 40.0 - 49.0 % Final     PLATELETS   Date Value Ref Range Status   03/06/2017 157 150 - 375 10*3/mm3 Final    "                   Assessment/Plan   1.  Diffuse large B-cell lymphoma status post CHOP Rituxan in 2011 with a complete remission. He follows up with Dr. Conner at [facility] and saw him last year. We will need to get the records from there and I have requested it.     2. New onset shortness of breath with night sweats, the scan shows 5.8 into 6.3-3.2 cm mass in the anterior mediastinum within the superior mediastinum just behind the sternum predominantly on the right of the midline which abuts the ascending aorta and aortic arch and causes of occlusion of the upper portion of the superior vena cava. Patient also has 2.1 cm right supraclavicular lymph node there is a pleural based 0.57 with a nodule in the right middle lobe.  Given pathology is negative we will need to refer patient to thoracic oncology to see if a mediastinoscopy with biopsy can be obtained in order to get the diagnosis.  If this turns out to be diffuse large B-cell lymphoma he would require salvage chemotherapy followed by autologous stem cell transplant.        3.  Mild tachycardia    Plan 1.  Will schedule patient with Dr. Palacios for possible mediastinoscopy with biopsy    2.  We will present him in the thoracic oncology conference.    3.  We will see him back in 3 weeks    Ailyn Georges MD                    Cc:  Librado Valdes*

## 2017-03-13 ENCOUNTER — DOCUMENTATION (OUTPATIENT)
Dept: ONCOLOGY | Facility: CLINIC | Age: 32
End: 2017-03-13

## 2017-03-13 NOTE — PROGRESS NOTES
Follow-up phone call made to patient regarding his score of 6 on the Distress Tool at his initial visit with Dr. Georges. A message was left on his voicemail about the purpose of our call, with encouragement for him to call either Gabrielle Bates or me. Also informed him that a SW visit has been made when he returns to see Dr. Georges.

## 2017-03-15 ENCOUNTER — TELEPHONE (OUTPATIENT)
Dept: ONCOLOGY | Facility: CLINIC | Age: 32
End: 2017-03-15

## 2017-03-15 NOTE — TELEPHONE ENCOUNTER
----- Message from Candie Hooker RN sent at 3/15/2017  2:28 PM EDT -----  Dr. CRISTOBAL Abarca referred this patient to Dr. Palacios for a Mede with bx but the patient was discussed in thoracic conference and it was decided the pt was not a candidate for this procedure. I did not schedule the patient and and made a note in the pt's referral. Dr. JAIN stated she would request records for [facility] and possible schedule a bone marrow bx. Thanks, Candie LILLY

## 2017-03-17 DIAGNOSIS — C83.30 DIFFUSE LARGE B-CELL LYMPHOMA, UNSPECIFIED BODY REGION (HCC): Primary | ICD-10-CM

## 2017-03-21 ENCOUNTER — HOSPITAL ENCOUNTER (OUTPATIENT)
Dept: CT IMAGING | Facility: HOSPITAL | Age: 32
Discharge: HOME | End: 2017-03-21
Attending: INTERNAL MEDICINE | Admitting: INTERNAL MEDICINE

## 2017-03-21 VITALS
HEIGHT: 70 IN | OXYGEN SATURATION: 97 % | RESPIRATION RATE: 18 BRPM | TEMPERATURE: 98.3 F | DIASTOLIC BLOOD PRESSURE: 83 MMHG | HEART RATE: 73 BPM | BODY MASS INDEX: 32.07 KG/M2 | WEIGHT: 224 LBS | SYSTOLIC BLOOD PRESSURE: 130 MMHG

## 2017-03-21 DIAGNOSIS — C83.30 DIFFUSE LARGE B-CELL LYMPHOMA, UNSPECIFIED BODY REGION (HCC): ICD-10-CM

## 2017-03-21 LAB
INR PPP: 1 (ref 0.8–1.2)
PROTHROMBIN TIME: 12.4 SECONDS (ref 12.8–15.2)

## 2017-03-21 PROCEDURE — 88305 TISSUE EXAM BY PATHOLOGIST: CPT | Performed by: INTERNAL MEDICINE

## 2017-03-21 PROCEDURE — 88189 FLOWCYTOMETRY/READ 16 & >: CPT

## 2017-03-21 PROCEDURE — 88264 CHROMOSOME ANALYSIS 20-25: CPT

## 2017-03-21 PROCEDURE — 77012 CT SCAN FOR NEEDLE BIOPSY: CPT

## 2017-03-21 PROCEDURE — 88313 SPECIAL STAINS GROUP 2: CPT

## 2017-03-21 PROCEDURE — 88237 TISSUE CULTURE BONE MARROW: CPT

## 2017-03-21 PROCEDURE — 88341 IMHCHEM/IMCYTCHM EA ADD ANTB: CPT

## 2017-03-21 PROCEDURE — 88311 DECALCIFY TISSUE: CPT | Performed by: INTERNAL MEDICINE

## 2017-03-21 PROCEDURE — 88342 IMHCHEM/IMCYTCHM 1ST ANTB: CPT

## 2017-03-21 PROCEDURE — 88185 FLOWCYTOMETRY/TC ADD-ON: CPT

## 2017-03-21 PROCEDURE — 88184 FLOWCYTOMETRY/ TC 1 MARKER: CPT

## 2017-03-21 RX ORDER — IBUPROFEN 200 MG
400 TABLET ORAL EVERY 6 HOURS PRN
COMMUNITY
End: 2017-09-11

## 2017-03-21 RX ORDER — RANITIDINE 150 MG/1
150 TABLET ORAL 2 TIMES DAILY
COMMUNITY
End: 2017-06-30

## 2017-03-21 RX ORDER — ALPRAZOLAM 0.5 MG/1
0.5 TABLET ORAL ONCE
Status: COMPLETED | OUTPATIENT
Start: 2017-03-21 | End: 2017-03-21

## 2017-03-21 RX ORDER — LIDOCAINE WITH 8.4% SOD BICARB 0.9%(10ML)
10 SYRINGE (ML) INJECTION ONCE
Status: COMPLETED | OUTPATIENT
Start: 2017-03-21 | End: 2017-03-21

## 2017-03-21 RX ADMIN — ALPRAZOLAM 0.5 MG: 0.5 TABLET ORAL at 13:56

## 2017-03-21 RX ADMIN — Medication 10 ML: at 14:40

## 2017-03-22 ENCOUNTER — TELEPHONE (OUTPATIENT)
Dept: INTERVENTIONAL RADIOLOGY/VASCULAR | Facility: HOSPITAL | Age: 32
End: 2017-03-22

## 2017-03-23 LAB — REF LAB TEST METHOD: NORMAL

## 2017-03-27 ENCOUNTER — APPOINTMENT (OUTPATIENT)
Dept: ONCOLOGY | Facility: CLINIC | Age: 32
End: 2017-03-27

## 2017-03-27 ENCOUNTER — LAB (OUTPATIENT)
Dept: LAB | Facility: HOSPITAL | Age: 32
End: 2017-03-27

## 2017-03-27 ENCOUNTER — OFFICE VISIT (OUTPATIENT)
Dept: ONCOLOGY | Facility: CLINIC | Age: 32
End: 2017-03-27

## 2017-03-27 VITALS
WEIGHT: 229.4 LBS | TEMPERATURE: 98.2 F | HEART RATE: 92 BPM | DIASTOLIC BLOOD PRESSURE: 82 MMHG | BODY MASS INDEX: 32.84 KG/M2 | HEIGHT: 70 IN | RESPIRATION RATE: 18 BRPM | SYSTOLIC BLOOD PRESSURE: 120 MMHG | OXYGEN SATURATION: 99 %

## 2017-03-27 DIAGNOSIS — I87.1 SVC SYNDROME: ICD-10-CM

## 2017-03-27 DIAGNOSIS — C83.30 DIFFUSE LARGE B-CELL LYMPHOMA, UNSPECIFIED BODY REGION (HCC): ICD-10-CM

## 2017-03-27 DIAGNOSIS — C83.33 DIFFUSE LARGE B-CELL LYMPHOMA OF INTRA-ABDOMINAL LYMPH NODES (HCC): Primary | ICD-10-CM

## 2017-03-27 LAB
ALBUMIN SERPL-MCNC: 4.6 G/DL (ref 3.5–5.2)
ALBUMIN/GLOB SERPL: 1.8 G/DL (ref 1.1–2.4)
ALP SERPL-CCNC: 56 U/L (ref 38–116)
ALT SERPL W P-5'-P-CCNC: 21 U/L (ref 0–41)
ANION GAP SERPL CALCULATED.3IONS-SCNC: 12.7 MMOL/L
AST SERPL-CCNC: 16 U/L (ref 0–40)
BASOPHILS # BLD AUTO: 0.05 10*3/MM3 (ref 0–0.1)
BASOPHILS NFR BLD AUTO: 1.3 % (ref 0–1.1)
BILIRUB SERPL-MCNC: 0.6 MG/DL (ref 0.1–1.2)
BUN BLD-MCNC: 16 MG/DL (ref 6–20)
BUN/CREAT SERPL: 16.2 (ref 7.3–30)
CALCIUM SPEC-SCNC: 9 MG/DL (ref 8.5–10.2)
CHLORIDE SERPL-SCNC: 103 MMOL/L (ref 98–107)
CO2 SERPL-SCNC: 24.3 MMOL/L (ref 22–29)
CREAT BLD-MCNC: 0.99 MG/DL (ref 0.7–1.3)
DEPRECATED RDW RBC AUTO: 39.5 FL (ref 37–49)
EOSINOPHIL # BLD AUTO: 0.16 10*3/MM3 (ref 0–0.36)
EOSINOPHIL NFR BLD AUTO: 4.2 % (ref 1–5)
ERYTHROCYTE [DISTWIDTH] IN BLOOD BY AUTOMATED COUNT: 13 % (ref 11.7–14.5)
GFR SERPL CREATININE-BSD FRML MDRD: 88 ML/MIN/1.73
GLOBULIN UR ELPH-MCNC: 2.6 GM/DL (ref 1.8–3.5)
GLUCOSE BLD-MCNC: 98 MG/DL (ref 74–124)
HCT VFR BLD AUTO: 43.9 % (ref 40–49)
HGB BLD-MCNC: 15.2 G/DL (ref 13.5–16.5)
IMM GRANULOCYTES # BLD: 0.02 10*3/MM3 (ref 0–0.03)
IMM GRANULOCYTES NFR BLD: 0.5 % (ref 0–0.5)
LDH SERPL-CCNC: 201 U/L (ref 99–259)
LYMPHOCYTES # BLD AUTO: 1 10*3/MM3 (ref 1–3.5)
LYMPHOCYTES NFR BLD AUTO: 26.2 % (ref 20–49)
MCH RBC QN AUTO: 29.1 PG (ref 27–33)
MCHC RBC AUTO-ENTMCNC: 34.6 G/DL (ref 32–35)
MCV RBC AUTO: 83.9 FL (ref 83–97)
MONOCYTES # BLD AUTO: 0.25 10*3/MM3 (ref 0.25–0.8)
MONOCYTES NFR BLD AUTO: 6.6 % (ref 4–12)
NEUTROPHILS # BLD AUTO: 2.33 10*3/MM3 (ref 1.5–7)
NEUTROPHILS NFR BLD AUTO: 61.2 % (ref 39–75)
NRBC BLD MANUAL-RTO: 0 /100 WBC (ref 0–0)
PLATELET # BLD AUTO: 135 10*3/MM3 (ref 150–375)
PMV BLD AUTO: 8.9 FL (ref 8.9–12.1)
POTASSIUM BLD-SCNC: 4.1 MMOL/L (ref 3.5–4.7)
PROT SERPL-MCNC: 7.2 G/DL (ref 6.3–8)
RBC # BLD AUTO: 5.23 10*6/MM3 (ref 4.3–5.5)
SODIUM BLD-SCNC: 140 MMOL/L (ref 134–145)
WBC NRBC COR # BLD: 3.81 10*3/MM3 (ref 4–10)

## 2017-03-27 PROCEDURE — 36415 COLL VENOUS BLD VENIPUNCTURE: CPT | Performed by: INTERNAL MEDICINE

## 2017-03-27 PROCEDURE — 99214 OFFICE O/P EST MOD 30 MIN: CPT | Performed by: INTERNAL MEDICINE

## 2017-03-27 PROCEDURE — 83615 LACTATE (LD) (LDH) ENZYME: CPT | Performed by: INTERNAL MEDICINE

## 2017-03-27 PROCEDURE — 80053 COMPREHEN METABOLIC PANEL: CPT | Performed by: INTERNAL MEDICINE

## 2017-03-27 PROCEDURE — 85025 COMPLETE CBC W/AUTO DIFF WBC: CPT | Performed by: INTERNAL MEDICINE

## 2017-03-27 NOTE — PROGRESS NOTES
Subjective .     REASONS FOR FOLLOWUP:   1. Diffuse large B-cell lymphoma diagnosed in 2011 status post chemotherapy with 8 cycles of CHOP Rituxan with complete remission.     2.  History of upper extremity DVTs for which she was on anticoagulation but it causes severe epistaxis. He does not tolerate aspirin either and hence not on anti-correlation.     3.  He has had bilateral pleural effusions for which she has had several thoracocentesis in the past.     4.  Status post excisional biopsy of left occipital lymph node. Pathology pending     HISTORY OF PRESENT ILLNESS:  The patient is a 32 y.o. year old male who is here for follow-up with the above-mentioned history.    History of Present Illness   patient is a 32-year-old gentleman with history of diffuse large B-cell lymphoma status post CHOP Rituxan followed by Dr. Conner at [facility], recently got admitted to the hospital because of shortness of breath.  He was found to have a CT scan showing of increase in the mediastinal mass from 4.3 cm to 5.8 in to 6.3 cm.  He also had occipital node which underwent he underwent excisional biopsy unfortunately the pathology is negative on that.  He underwent a PET scan which showed mild uptake with SUV of 3-4 in the mediastinal mass with a very low uptake in the cervical lymph node.  He will need tissue diagnosis given the fact that he has symptoms of shortness of breath and occasional night sweats.  There has been no comparison between the present CT scan and the CT from 2015 that was done at [facility].    Discussion was done in the lung cancer conference and decision was made to do a bone marrow aspiration and biopsy to evaluate for lymphoma and that was negative.  Details under oncologic history.    Patient continues to have some chronic shortness of breath.  He states he has had migraine headaches since the time he has developed SVC syndrome.  He has been seen by neurologist has had MRI and  has been negative.  His neurologist is at  as well.    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 He presented with superior vena cava syndrome and a mass that was occluding the superior vena cava. He had a [place] procedure to make the diagnosis and he ended up with pleural effusion, chest tubes in place and finally chemotherapy with 8 cycles of CHOP/Rituxan and subsequently radiation therapy to the mediastinum. He had issues in regard to superior vena cava syndrome on a chronic basis that unfortunately has not had any proper resolution because there is no resolution for the kind of circumstance that he has anyway. He has taken anticoagulants before because of previous thrombosis in the upper extremities veins, but he is not taking this anymore because of frequent epistaxis. In any event, for the last 2 weeks the patient has had nocturnal sweats and he has felt a left occipital lymph node coming and going. He has not had any alterations in appetite or weight, he has not had any fever and he has not had any rashes in the skin. He has not had any modification in cough, sputum production or shortness of breath, no pain in the chest, normal bowel activity, normal urination, no alteration in the penis, no alteration in the testicles and no alterations in the skin and no neurological dysfunction. He denies any other symptomatology related to lymphoma. The last time he was seen by the Hematology/Oncology Team at [facility] was a  year ago on clinical grounds with no CT scans.       He was admitted yesterday due to symptoms as above and a CT scan documented a retrosternal mass, precardiac mass; we do not know if this a chronic or an acute new issue     Ct scan: 2/25/2070     There is a 5.8 x 3.2 x 6.3 cm (this mass was previously measured at 4.8  x 2.7 cm on the study of 1/9/2014) anterior mediastinal mass within the  superior mediastinum just behind the sternum predominantly to the right  of midline which posteriorly abuts the ascending aorta and the aortic  arch and causes complete occlusion of the upper portion of the superior  vena cava, the superior vena cava reconstitutes at the level of entry of  the azygos vein.       2.1 cm new right supraclavicular lymph node was not mentioned on the  previous study of 1/9/2014. Prominent collateral veins are seen  extending from the internal mammary veins and right subclavian vein into  the anterior chest wall predominantly to the right of midline.      No consolidation or effusion. Pleural-based 0.5 cm nodule in the right  middle lobe.      Upper abdominal viscera are essentially unremarkable.     CT abdomen pelvis 2/27 negative  PET scan 3/2/17  IMPRESSION:  1. Infiltrative anterior mediastinal lymphadenopathy has low-level  activity. There is otherwise no hypermetabolic lymphadenopathy.  2. Low level activity at the surgical bed at the posterior subcutaneous  soft tissues of the neck on the left.    Path on occipital to lymph node is negative    Discussion done in the multidisciplinary clinic in the lung cancer conference and patient not a candidate to do a mediastinoscopy again.  Given low uptake it was felt whether this could be just a residual scar tissue from his previous SVC syndrome.  However a decision was made to see if he can do a bone marrow and repeat CT scan in 2 months in order to follow-up this 6.3 cm mediastinal mass.    We have tried to contact Dr. Conner on several locations  and we will recheck today.    Bone marrow aspiration biopsy 3/21/2017 shows slightly hypocellular marrow with maturing trilineage hematopoiesis but no evidence of any lymphoma, leukemia.  Cytogenetics is pending.  Flow is negative as well.      No current facility-administered medications on file prior to encounter.        Current Outpatient Prescriptions on File Prior to Visit   Medication Sig Dispense Refill   • ibuprofen (ADVIL,MOTRIN) 200 MG tablet Take 400 mg by mouth Every 6 (Six) Hours As Needed for Mild Pain (1-3).     • meclizine (ANTIVERT) 50 MG tablet Take 0.5 tablets by mouth 3 (three) times a day as needed for dizziness for up to 20 doses. 20 tablet 0   • promethazine (PHENERGAN) 25 MG tablet Take 1 tablet by mouth Every 6 (Six) Hours As Needed for nausea or vomiting. 30 tablet 0   • raNITIdine (ZANTAC) 150 MG tablet Take 150 mg by mouth 2 (Two) Times a Day.       No current facility-administered medications on file prior to visit.        ALLERGIES:   No Known Allergies    Social History     Social History   • Marital status: Single     Spouse name: N/A   • Number of children: N/A   • Years of education: College     Occupational History   • Physical therapy tech [employer]     Social History Main Topics   • Smoking status: Never Smoker   • Smokeless tobacco: Not on file   • Alcohol use Yes   • Drug use: No   • Sexual activity: Not on file     Other Topics Concern   • Not on file     Social History Narrative    Pt states hx of attempted stent placement and internal bypass, states both surgeries unsuccessful.         Cancer-related family history includes Cancer in his maternal grandfather, maternal grandmother, paternal grandfather, and paternal grandmother.     Review of Systems  A comprehensive 14 point review of systems was performed and was negative except as mentioned.  Patient continues to have some shortness of breath chronically.  Objective      There were no vitals filed for this  visit.  Current Status 3/6/2017   ECOG score 0       Physical Exam    GENERAL:  Well-developed, well-nourished in no acute distress.   SKIN:  Warm, dry without rashes, purpura or petechiae.  EYES:  Pupils equal, round and reactive to light.  EOMs intact.  Conjunctivae normal.  EARS:  Hearing intact.  NOSE:  Septum midline.  No excoriations or nasal discharge.  MOUTH:  Tongue is well-papillated; no stomatitis or ulcers.  Lips normal.  THROAT:  Oropharynx without lesions or exudates.  NECK:  Supple with good range of motion; no thyromegaly or masses, no JVD.  LYMPHATICS:  No cervical, supraclavicular, axillary or inguinal adenopathy.  CHEST:  Lungs clear to auscultation. Good airflow.  CARDIAC:  Regular rate and rhythm without murmurs, rubs or gallops. Normal S1,S2.  ABDOMEN:  Soft, nontender with no hepatosplenomegaly or masses.  EXTREMITIES:  No clubbing, cyanosis or edema.  NEUROLOGICAL:  Cranial Nerves II-XII grossly intact.  No focal neurological deficits.  PSYCHIATRIC:  Normal affect and mood.        RECENT LABS:  Hematology WBC   Date Value Ref Range Status   03/27/2017 3.81 (L) 4.00 - 10.00 10*3/mm3 Final     RBC   Date Value Ref Range Status   03/27/2017 5.23 4.30 - 5.50 10*6/mm3 Final     Hemoglobin   Date Value Ref Range Status   03/27/2017 15.2 13.5 - 16.5 g/dL Final     Hematocrit   Date Value Ref Range Status   03/27/2017 43.9 40.0 - 49.0 % Final     Platelets   Date Value Ref Range Status   03/27/2017 135 (L) 150 - 375 10*3/mm3 Final                      Assessment/Plan   1.  Diffuse large B-cell lymphoma status post CHOP Rituxan in 2011 with a complete remission. He follows up with Dr. Conner at [facility] and saw him last year. We will need to get the records from there and I have requested it.     2. New onset shortness of breath with night sweats, the scan shows 5.8 into 6.3-3.2 cm mass in the anterior mediastinum within the superior mediastinum just behind the sternum predominantly on  the right of the midline which abuts the ascending aorta and aortic arch and causes of occlusion of the upper portion of the superior vena cava. Patient also has 2.1 cm right supraclavicular lymph node there is a pleural based 0.57 with a nodule in the right middle lobe.  Given pathology is negative we will need to refer patient to thoracic oncology to see if a mediastinoscopy with biopsy can be obtained in order to get the diagnosis.  If this turns out to be diffuse large B-cell lymphoma he would require salvage chemotherapy followed by autologous stem cell transplant.    Given that repeat mediastinoscopy is difficult in this situation, a decision was made to consider bone marrow aspiration and biopsy which is also negative.  Patient continues to be short of breath on and off intermittently and hence we will likely have to obtain a pulmonary consult to see if he needs pulmonary function testing as well as evaluation by bronchoscopy.        3.  Mild tachycardia    Plan 1.  This schedule follow-up with pulmonary for shortness of breath to evaluate if this is SVC syndrome versus recurrence of lymphoma.    2.  We will see him back in 4 weeks with a CT scan of the neck chest abdomen pelvis prior to his appointment thank you  Ailyn Georges MD                    Cc:  Librado Valdes*

## 2017-04-01 LAB
CYTO UR: NORMAL
LAB AP CASE REPORT: NORMAL
LAB AP CLINICAL INFORMATION: NORMAL
Lab: NORMAL
PATH REPORT.ADDENDUM SPEC: NORMAL
PATH REPORT.FINAL DX SPEC: NORMAL
PATH REPORT.GROSS SPEC: NORMAL

## 2017-04-20 ENCOUNTER — HOSPITAL ENCOUNTER (OUTPATIENT)
Dept: PET IMAGING | Facility: HOSPITAL | Age: 32
Discharge: HOME | End: 2017-04-20
Attending: INTERNAL MEDICINE | Admitting: INTERNAL MEDICINE

## 2017-04-20 DIAGNOSIS — I87.1 SVC SYNDROME: ICD-10-CM

## 2017-04-20 DIAGNOSIS — C83.33 DIFFUSE LARGE B-CELL LYMPHOMA OF INTRA-ABDOMINAL LYMPH NODES (HCC): ICD-10-CM

## 2017-04-20 LAB — CREAT BLDA-MCNC: 1 MG/DL (ref 0.6–1.3)

## 2017-04-20 PROCEDURE — 0 DIATRIZOATE MEGLUMINE & SODIUM PER 1 ML: Performed by: INTERNAL MEDICINE

## 2017-04-20 PROCEDURE — 0 IOPAMIDOL 61 % SOLUTION: Performed by: INTERNAL MEDICINE

## 2017-04-20 PROCEDURE — 71260 CT THORAX DX C+: CPT

## 2017-04-20 PROCEDURE — 82565 ASSAY OF CREATININE: CPT

## 2017-04-20 PROCEDURE — 70491 CT SOFT TISSUE NECK W/DYE: CPT

## 2017-04-20 PROCEDURE — 74177 CT ABD & PELVIS W/CONTRAST: CPT

## 2017-04-20 RX ADMIN — IOPAMIDOL 85 ML: 612 INJECTION, SOLUTION INTRAVENOUS at 09:11

## 2017-04-20 RX ADMIN — DIATRIZOATE MEGLUMINE AND DIATRIZOATE SODIUM 30 ML: 660; 100 LIQUID ORAL; RECTAL at 08:27

## 2017-04-24 ENCOUNTER — LAB (OUTPATIENT)
Dept: LAB | Facility: HOSPITAL | Age: 32
End: 2017-04-24

## 2017-04-24 ENCOUNTER — OFFICE VISIT (OUTPATIENT)
Dept: ONCOLOGY | Facility: CLINIC | Age: 32
End: 2017-04-24

## 2017-04-24 VITALS
OXYGEN SATURATION: 98 % | RESPIRATION RATE: 12 BRPM | BODY MASS INDEX: 32.33 KG/M2 | SYSTOLIC BLOOD PRESSURE: 130 MMHG | HEART RATE: 80 BPM | DIASTOLIC BLOOD PRESSURE: 86 MMHG | WEIGHT: 225.8 LBS | HEIGHT: 70 IN | TEMPERATURE: 99 F

## 2017-04-24 DIAGNOSIS — C83.33 DIFFUSE LARGE B-CELL LYMPHOMA OF INTRA-ABDOMINAL LYMPH NODES (HCC): ICD-10-CM

## 2017-04-24 DIAGNOSIS — C83.33 DIFFUSE LARGE B-CELL LYMPHOMA OF INTRA-ABDOMINAL LYMPH NODES (HCC): Primary | ICD-10-CM

## 2017-04-24 DIAGNOSIS — I87.1 SVC SYNDROME: Primary | ICD-10-CM

## 2017-04-24 LAB
BASOPHILS # BLD AUTO: 0.05 10*3/MM3 (ref 0–0.1)
BASOPHILS NFR BLD AUTO: 1.2 % (ref 0–1.1)
DEPRECATED RDW RBC AUTO: 39.3 FL (ref 37–49)
EOSINOPHIL # BLD AUTO: 0.19 10*3/MM3 (ref 0–0.36)
EOSINOPHIL NFR BLD AUTO: 4.5 % (ref 1–5)
ERYTHROCYTE [DISTWIDTH] IN BLOOD BY AUTOMATED COUNT: 13 % (ref 11.7–14.5)
HCT VFR BLD AUTO: 48.3 % (ref 40–49)
HGB BLD-MCNC: 16.7 G/DL (ref 13.5–16.5)
IMM GRANULOCYTES # BLD: 0.03 10*3/MM3 (ref 0–0.03)
IMM GRANULOCYTES NFR BLD: 0.7 % (ref 0–0.5)
LYMPHOCYTES # BLD AUTO: 1.2 10*3/MM3 (ref 1–3.5)
LYMPHOCYTES NFR BLD AUTO: 28.5 % (ref 20–49)
MCH RBC QN AUTO: 28.7 PG (ref 27–33)
MCHC RBC AUTO-ENTMCNC: 34.6 G/DL (ref 32–35)
MCV RBC AUTO: 83.1 FL (ref 83–97)
MONOCYTES # BLD AUTO: 0.29 10*3/MM3 (ref 0.25–0.8)
MONOCYTES NFR BLD AUTO: 6.9 % (ref 4–12)
NEUTROPHILS # BLD AUTO: 2.45 10*3/MM3 (ref 1.5–7)
NEUTROPHILS NFR BLD AUTO: 58.2 % (ref 39–75)
NRBC BLD MANUAL-RTO: 0 /100 WBC (ref 0–0)
PLATELET # BLD AUTO: 150 10*3/MM3 (ref 150–375)
PMV BLD AUTO: 9.4 FL (ref 8.9–12.1)
RBC # BLD AUTO: 5.81 10*6/MM3 (ref 4.3–5.5)
WBC NRBC COR # BLD: 4.21 10*3/MM3 (ref 4–10)

## 2017-04-24 PROCEDURE — 99214 OFFICE O/P EST MOD 30 MIN: CPT | Performed by: INTERNAL MEDICINE

## 2017-04-24 PROCEDURE — 85025 COMPLETE CBC W/AUTO DIFF WBC: CPT

## 2017-04-24 PROCEDURE — 36416 COLLJ CAPILLARY BLOOD SPEC: CPT

## 2017-04-24 RX ORDER — PREDNISONE 50 MG/1
TABLET ORAL
Qty: 3 TABLET | Refills: 0 | OUTPATIENT
Start: 2017-04-24 | End: 2017-06-27

## 2017-04-24 NOTE — PROGRESS NOTES
Subjective .     REASONS FOR FOLLOWUP:   1. Diffuse large B-cell lymphoma diagnosed in 2011 status post chemotherapy with 8 cycles of CHOP Rituxan with complete remission.     2.  History of upper extremity DVTs for which she was on anticoagulation but it causes severe epistaxis. He does not tolerate aspirin either and hence not on anti-correlation.     3.  He has had bilateral pleural effusions for which she has had several thoracocentesis in the past.     4.  Status post excisional biopsy of left occipital lymph node. Pathology pending     HISTORY OF PRESENT ILLNESS:  The patient is a 32 y.o. year old male who is here for follow-up with the above-mentioned history.    History of Present Illness   patient is a 32-year-old gentleman with history of diffuse large B-cell lymphoma status post CHOP Rituxan followed by Dr. Conner at [facility], recently got admitted to the hospital because of shortness of breath.  He was found to have a CT scan showing of increase in the mediastinal mass from 4.3 cm to 5.8 in to 6.3 cm.  He also had occipital node which underwent he underwent excisional biopsy unfortunately the pathology is negative on that.  He underwent a PET scan which showed mild uptake with SUV of 3-4 in the mediastinal mass with a very low uptake in the cervical lymph node.  He will need tissue diagnosis given the fact that he has symptoms of shortness of breath and occasional night sweats.  There has been no comparison between the present CT scan and the CT from 2015 that was done at [facility].    Discussion was done in the lung cancer conference and decision was made to do a bone marrow aspiration and biopsy to evaluate for lymphoma and that was negative.  Details under oncologic history.    Patient continues to have some chronic shortness of breath.  He states he has had migraine headaches since the time he has developed SVC syndrome.  He has been seen by neurologist has had MRI and  has been negative.  His neurologist is at  as well.    Patient had repeat CT scan of the chest abdomen pelvis without change from December CT.  And feels much better.  He has been seen by cardiologist  in the past who was  trying to stent him The SVC but was unsuccessful.    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 He presented with superior vena cava syndrome and a mass that was occluding the superior vena cava. He had a Telephone procedure to make the diagnosis and he ended up with pleural effusion, chest tubes in place and finally chemotherapy with 8 cycles of CHOP/Rituxan and subsequently radiation therapy to the mediastinum. He had issues in regard to superior vena cava syndrome on a chronic basis that unfortunately has not had any proper resolution because there is no resolution for the kind of circumstance that he has anyway. He has taken anticoagulants before because of previous thrombosis in the upper extremities veins, but he is not taking this anymore because of frequent epistaxis. In any event, for the last 2 weeks the patient has had nocturnal sweats and he has felt a left occipital lymph node coming and going. He has not had any alterations in appetite or weight, he has not had any fever and he has not had any rashes in the skin. He has not had any modification in cough, sputum production or shortness of breath, no pain in the chest, normal bowel activity, normal urination, no alteration in the penis, no alteration in the  testicles and no alterations in the skin and no neurological dysfunction. He denies any other symptomatology related to lymphoma. The last time he was seen by the Hematology/Oncology Team at [facility] was a year ago on clinical grounds with no CT scans.       He was admitted yesterday due to symptoms as above and a CT scan documented a retrosternal mass, precardiac mass; we do not know if this a chronic or an acute new issue     Ct scan: 2/25/2070     There is a 5.8 x 3.2 x 6.3 cm (this mass was previously measured at 4.8  x 2.7 cm on the study of 1/9/2014) anterior mediastinal mass within the  superior mediastinum just behind the sternum predominantly to the right  of midline which posteriorly abuts the ascending aorta and the aortic  arch and causes complete occlusion of the upper portion of the superior  vena cava, the superior vena cava reconstitutes at the level of entry of  the azygos vein.       2.1 cm new right supraclavicular lymph node was not mentioned on the  previous study of 1/9/2014. Prominent collateral veins are seen  extending from the internal mammary veins and right subclavian vein into  the anterior chest wall predominantly to the right of midline.      No consolidation or effusion. Pleural-based 0.5 cm nodule in the right  middle lobe.      Upper abdominal viscera are essentially unremarkable.     CT abdomen pelvis 2/27 negative  PET scan 3/2/17  IMPRESSION:  1. Infiltrative anterior mediastinal lymphadenopathy has low-level  activity. There is otherwise no hypermetabolic lymphadenopathy.  2. Low level activity at the surgical bed at the posterior subcutaneous  soft tissues of the neck on the left.    Path on occipital to lymph node is negative    Discussion done in the multidisciplinary clinic in the lung cancer conference and patient not a candidate to do a mediastinoscopy again.  Given low uptake it was felt whether this could be just a residual scar tissue from his  previous SVC syndrome.  However a decision was made to see if he can do a bone marrow and repeat CT scan in 2 months in order to follow-up this 6.3 cm mediastinal mass.    We have tried to contact Dr. Conner on several locations and we will recheck today.    Bone marrow aspiration biopsy 3/21/2017 shows slightly hypocellular marrow with maturing trilineage hematopoiesis but no evidence of any lymphoma, leukemia.  Cytogenetics is pending.  Flow is negative as well.      No current facility-administered medications on file prior to encounter.        Current Outpatient Prescriptions on File Prior to Visit   Medication Sig Dispense Refill   • ibuprofen (ADVIL,MOTRIN) 200 MG tablet Take 400 mg by mouth Every 6 (Six) Hours As Needed for Mild Pain (1-3).     • meclizine (ANTIVERT) 50 MG tablet Take 0.5 tablets by mouth 3 (three) times a day as needed for dizziness for up to 20 doses. 20 tablet 0   • promethazine (PHENERGAN) 25 MG tablet Take 1 tablet by mouth Every 6 (Six) Hours As Needed for nausea or vomiting. 30 tablet 0   • raNITIdine (ZANTAC) 150 MG tablet Take 150 mg by mouth 2 (Two) Times a Day.       No current facility-administered medications on file prior to visit.        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         Cancer-related family history includes Cancer in his maternal grandfather, maternal grandmother, paternal grandfather, and paternal grandmother.      Review of Systems  A comprehensive 14 point review of systems was performed and was negative except as mentioned.  Patient continues to have some shortness of breath chronically.  Objective      There were no vitals filed for this visit.  Current Status 3/27/2017   ECOG score 0       Physical Exam    GENERAL:  Well-developed, well-nourished in no acute distress.   SKIN:  Warm, dry without rashes, purpura or petechiae.  EYES:  Pupils equal, round and reactive to light.  EOMs intact.  Conjunctivae normal.  EARS:  Hearing intact.  NOSE:  Septum midline.  No excoriations or nasal discharge.  MOUTH:  Tongue is well-papillated; no stomatitis or ulcers.  Lips normal.  THROAT:  Oropharynx without lesions or exudates.  NECK:  Supple with good range of motion; no thyromegaly or masses, no JVD.  LYMPHATICS:  No cervical, supraclavicular, axillary or inguinal adenopathy.  CHEST:  Lungs clear to auscultation. Good airflow.  CARDIAC:  Regular rate and rhythm without murmurs, rubs or gallops. Normal S1,S2.  ABDOMEN:  Soft, nontender with no hepatosplenomegaly or masses.  EXTREMITIES:  No clubbing, cyanosis or edema.  NEUROLOGICAL:  Cranial Nerves II-XII grossly intact.  No focal neurological deficits.  PSYCHIATRIC:  Normal affect and mood.        RECENT LABS:  Hematology WBC   Date Value Ref Range Status   04/24/2017 4.21 4.00 - 10.00 10*3/mm3 Final     RBC   Date Value Ref Range Status   04/24/2017 5.81 (H) 4.30 - 5.50 10*6/mm3 Final     Hemoglobin   Date Value Ref Range Status   04/24/2017 16.7 (H) 13.5 - 16.5 g/dL Final     Hematocrit   Date Value Ref Range Status   04/24/2017 48.3 40.0 - 49.0 % Final     Platelets   Date Value Ref Range Status   04/24/2017 150 150 - 375 10*3/mm3 Final                      Assessment/Plan   1.  Diffuse large B-cell lymphoma status post CHOP Rituxan in 2011 with a complete remission. He follows up with Dr. Conner at [facility] and saw him last year. We will need to get the  records from there and I have requested it.     2. New onset shortness of breath with night sweats, the scan shows 5.8 into 6.3-3.2 cm mass in the anterior mediastinum within the superior mediastinum just behind the sternum predominantly on the right of the midline which abuts the ascending aorta and aortic arch and causes of occlusion of the upper portion of the superior vena cava. Patient also has 2.1 cm right supraclavicular lymph node there is a pleural based 0.57 with a nodule in the right middle lobe.  Given pathology is negative we will need to refer patient to thoracic oncology to see if a mediastinoscopy with biopsy can be obtained in order to get the diagnosis.  If this turns out to be diffuse large B-cell lymphoma he would require salvage chemotherapy followed by autologous stem cell transplant.    Given that repeat mediastinoscopy is difficult in this situation, a decision was made to consider bone marrow aspiration and biopsy which is also negative.  Patient has come up appointment with [facility].     3.  Mild tachycardia    Plan 1.  With cardiology at .    2. .  Follow-up with  pulmonary in 1 week.     Fu in 3 months with ct scan.    Ailyn Georges MD              . Ailyn Georges MD                    Cc:  Librado Valdes*

## 2017-06-19 ENCOUNTER — HOSPITAL ENCOUNTER (EMERGENCY)
Facility: HOSPITAL | Age: 32
Discharge: LEFT BEFORE DISCHARGE | End: 2017-06-19

## 2017-06-19 ENCOUNTER — TELEPHONE (OUTPATIENT)
Dept: ONCOLOGY | Facility: CLINIC | Age: 32
End: 2017-06-19

## 2017-06-19 VITALS
BODY MASS INDEX: 32.48 KG/M2 | TEMPERATURE: 99.2 F | WEIGHT: 232 LBS | DIASTOLIC BLOOD PRESSURE: 97 MMHG | HEART RATE: 76 BPM | SYSTOLIC BLOOD PRESSURE: 147 MMHG | RESPIRATION RATE: 16 BRPM | HEIGHT: 71 IN | OXYGEN SATURATION: 96 %

## 2017-06-19 PROCEDURE — 99211 OFF/OP EST MAY X REQ PHY/QHP: CPT

## 2017-06-19 NOTE — ED TRIAGE NOTES
Was told by hematologist to come to ER for c/o headache and pressure in his head and has had increased nausea and visual abnormalities for the past several days.

## 2017-06-19 NOTE — TELEPHONE ENCOUNTER
Patient calling with c/o blurry vision and pressure around his eyes. He has had a lot of nausea and his face is flushed.  Reviewed with Dr Georges. She called and spoke with patient and directed them to go to the ER. He will need to be evaluated and possible have a head scan.

## 2017-06-20 ENCOUNTER — HOSPITAL ENCOUNTER (EMERGENCY)
Facility: HOSPITAL | Age: 32
Discharge: HOME | End: 2017-06-20
Attending: EMERGENCY MEDICINE | Admitting: EMERGENCY MEDICINE

## 2017-06-20 ENCOUNTER — APPOINTMENT (OUTPATIENT)
Dept: MRI IMAGING | Facility: HOSPITAL | Age: 32
End: 2017-06-20

## 2017-06-20 VITALS
BODY MASS INDEX: 32.48 KG/M2 | SYSTOLIC BLOOD PRESSURE: 132 MMHG | DIASTOLIC BLOOD PRESSURE: 89 MMHG | TEMPERATURE: 97.6 F | RESPIRATION RATE: 17 BRPM | HEIGHT: 71 IN | OXYGEN SATURATION: 98 % | WEIGHT: 232 LBS | HEART RATE: 71 BPM

## 2017-06-20 DIAGNOSIS — R51.9 NONINTRACTABLE HEADACHE, UNSPECIFIED CHRONICITY PATTERN, UNSPECIFIED HEADACHE TYPE: Primary | ICD-10-CM

## 2017-06-20 LAB
ALBUMIN SERPL-MCNC: 4.5 G/DL (ref 3.5–5.2)
ALBUMIN/GLOB SERPL: 1.4 G/DL
ALP SERPL-CCNC: 49 U/L (ref 39–117)
ALT SERPL W P-5'-P-CCNC: 46 U/L (ref 1–41)
ANION GAP SERPL CALCULATED.3IONS-SCNC: 12.7 MMOL/L
AST SERPL-CCNC: 34 U/L (ref 1–40)
BASOPHILS # BLD AUTO: 0.03 10*3/MM3 (ref 0–0.2)
BASOPHILS NFR BLD AUTO: 0.8 % (ref 0–1.5)
BILIRUB SERPL-MCNC: 0.7 MG/DL (ref 0.1–1.2)
BILIRUB UR QL STRIP: NEGATIVE
BUN BLD-MCNC: 13 MG/DL (ref 6–20)
BUN/CREAT SERPL: 11.1 (ref 7–25)
CALCIUM SPEC-SCNC: 9.2 MG/DL (ref 8.6–10.5)
CHLORIDE SERPL-SCNC: 100 MMOL/L (ref 98–107)
CLARITY UR: CLEAR
CO2 SERPL-SCNC: 25.3 MMOL/L (ref 22–29)
COLOR UR: YELLOW
CREAT BLD-MCNC: 1.17 MG/DL (ref 0.76–1.27)
DEPRECATED RDW RBC AUTO: 39.7 FL (ref 37–54)
EOSINOPHIL # BLD AUTO: 0.1 10*3/MM3 (ref 0–0.7)
EOSINOPHIL NFR BLD AUTO: 2.5 % (ref 0.3–6.2)
ERYTHROCYTE [DISTWIDTH] IN BLOOD BY AUTOMATED COUNT: 13 % (ref 11.5–14.5)
GFR SERPL CREATININE-BSD FRML MDRD: 72 ML/MIN/1.73
GLOBULIN UR ELPH-MCNC: 3.2 GM/DL
GLUCOSE BLD-MCNC: 93 MG/DL (ref 65–99)
GLUCOSE UR STRIP-MCNC: NEGATIVE MG/DL
HCT VFR BLD AUTO: 44.9 % (ref 40.4–52.2)
HGB BLD-MCNC: 15.8 G/DL (ref 13.7–17.6)
HGB UR QL STRIP.AUTO: NEGATIVE
HOLD SPECIMEN: NORMAL
HOLD SPECIMEN: NORMAL
IMM GRANULOCYTES # BLD: 0.02 10*3/MM3 (ref 0–0.03)
IMM GRANULOCYTES NFR BLD: 0.5 % (ref 0–0.5)
KETONES UR QL STRIP: NEGATIVE
LEUKOCYTE ESTERASE UR QL STRIP.AUTO: NEGATIVE
LIPASE SERPL-CCNC: 20 U/L (ref 13–60)
LYMPHOCYTES # BLD AUTO: 0.98 10*3/MM3 (ref 0.9–4.8)
LYMPHOCYTES NFR BLD AUTO: 24.5 % (ref 19.6–45.3)
MCH RBC QN AUTO: 29.6 PG (ref 27–32.7)
MCHC RBC AUTO-ENTMCNC: 35.2 G/DL (ref 32.6–36.4)
MCV RBC AUTO: 84.2 FL (ref 79.8–96.2)
MONOCYTES # BLD AUTO: 0.33 10*3/MM3 (ref 0.2–1.2)
MONOCYTES NFR BLD AUTO: 8.3 % (ref 5–12)
NEUTROPHILS # BLD AUTO: 2.54 10*3/MM3 (ref 1.9–8.1)
NEUTROPHILS NFR BLD AUTO: 63.4 % (ref 42.7–76)
NITRITE UR QL STRIP: NEGATIVE
PH UR STRIP.AUTO: 6 [PH] (ref 5–8)
PLATELET # BLD AUTO: 155 10*3/MM3 (ref 140–500)
PMV BLD AUTO: 9.6 FL (ref 6–12)
POTASSIUM BLD-SCNC: 4.4 MMOL/L (ref 3.5–5.2)
PROT SERPL-MCNC: 7.7 G/DL (ref 6–8.5)
PROT UR QL STRIP: NEGATIVE
RBC # BLD AUTO: 5.33 10*6/MM3 (ref 4.6–6)
SODIUM BLD-SCNC: 138 MMOL/L (ref 136–145)
SP GR UR STRIP: 1.02 (ref 1–1.03)
UROBILINOGEN UR QL STRIP: NORMAL
WBC NRBC COR # BLD: 4 10*3/MM3 (ref 4.5–10.7)
WHOLE BLOOD HOLD SPECIMEN: NORMAL
WHOLE BLOOD HOLD SPECIMEN: NORMAL

## 2017-06-20 PROCEDURE — 0 GADOBENATE DIMEGLUMINE 529 MG/ML SOLUTION: Performed by: EMERGENCY MEDICINE

## 2017-06-20 PROCEDURE — 81003 URINALYSIS AUTO W/O SCOPE: CPT | Performed by: EMERGENCY MEDICINE

## 2017-06-20 PROCEDURE — A9577 INJ MULTIHANCE: HCPCS | Performed by: EMERGENCY MEDICINE

## 2017-06-20 PROCEDURE — 83690 ASSAY OF LIPASE: CPT | Performed by: EMERGENCY MEDICINE

## 2017-06-20 PROCEDURE — 85025 COMPLETE CBC W/AUTO DIFF WBC: CPT | Performed by: EMERGENCY MEDICINE

## 2017-06-20 PROCEDURE — 80053 COMPREHEN METABOLIC PANEL: CPT | Performed by: EMERGENCY MEDICINE

## 2017-06-20 PROCEDURE — 70553 MRI BRAIN STEM W/O & W/DYE: CPT

## 2017-06-20 PROCEDURE — 99283 EMERGENCY DEPT VISIT LOW MDM: CPT

## 2017-06-20 RX ORDER — SODIUM CHLORIDE 0.9 % (FLUSH) 0.9 %
10 SYRINGE (ML) INJECTION AS NEEDED
Status: DISCONTINUED | OUTPATIENT
Start: 2017-06-20 | End: 2017-06-20 | Stop reason: HOSPADM

## 2017-06-20 RX ADMIN — GADOBENATE DIMEGLUMINE 20 ML: 529 INJECTION, SOLUTION INTRAVENOUS at 17:35

## 2017-06-20 NOTE — ED PROVIDER NOTES
" EMERGENCY DEPARTMENT ENCOUNTER    CHIEF COMPLAINT  Chief Complaint: head pressure  History given by: patient  History limited by: nothing  Room Number: 22/22  PMD: No Known Provider      HPI:  Pt is a 32 y.o. male who presents complaining of constant, increased \"pressure\" behind his eyes x2 weeks. 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 In fact he states he frequently gets mild pressure behind eyes and feels it is a little worse past couple of weeks. Not abrupt in onset. No fevers. He called [facility] and they sent him to the ED.  . Pt also c/o visual changes x4 weeks, stating it is difficult for him to track and focus on moving objects. When objects are still he focus on the objects and his vision is clear. No diplopia. Pt states he saw an ophthalmologist who told him he had degenerative changes and was advised to wear \"readers\" which he states alleviated some of his sx for a period of time. Pt denies any recent head/trauma, injury, cough, extremity weakness, speech difficulty, trouble swallowing, and difficulty ambulating.   Pt contacted Dr. Georges today and was advised to come to the ED for further evaluation.   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 No other procedure offered to patient.     Duration:  2 weeks  Onset: gradual  Timing: constant  Location: eyes  Quality: pressure  Intensity/Severity: moderate  Progression: worsening  Associated Symptoms: visual changes  Aggravating Factors: bending forward  Alleviating Factors: none  Previous Episodes: Pt states he always experiences some head pressure but this is worse  Treatment before arrival: none    PAST MEDICAL HISTORY  Active Ambulatory Problems     Diagnosis Date Noted   • SVC syndrome 02/25/2017   • Diffuse large B cell lymphoma 02/26/2017     Resolved Ambulatory Problems     Diagnosis Date Noted   • No Resolved Ambulatory Problems     Past " Medical History:   Diagnosis Date   • GERD (gastroesophageal reflux disease)    • Lymphoma    • Superior vena cava syndrome    • TIA (transient ischemic attack)        PAST SURGICAL HISTORY  Past Surgical History:   Procedure Laterality Date   • BRACHIOCEPHALIC VEIN ANGIOPLASTY / STENTING Bilateral     no stents were able to be placed   • CHEST TUBE INSERTION Right     after thoracentesis   • EXCISION MASS HEAD/NECK Left 2/27/2017    Procedure: Excisional biopsy of left occipital lymph node;  Surgeon: Catalino Damon MD;  Location: [facility];  Service:    • RIB BIOPSY Right 2011    MEDIASTINUM   • SKIN BIOPSY      cyst on left shoulder, benign   • THORACENTESIS Right     thora drain left in for 6 months       FAMILY HISTORY  Family History   Problem Relation Age of Onset   • Cancer Maternal Grandmother    • Cancer Maternal Grandfather    • Cancer Paternal Grandmother    • Diabetes Paternal Grandmother    • Cancer Paternal Grandfather        SOCIAL HISTORY  Social History     Social History   • Marital status: Single     Spouse name: N/A   • Number of children: N/A   • Years of education: College     Occupational History   • Physical therapy tech Other [employer]     Social History Main Topics   • Smoking status: Never Smoker   • Smokeless tobacco: Not on file   • Alcohol use Yes      Comment: occasional   • Drug use: No   • Sexual activity: Not on file     Other Topics Concern   • Not on file     Social History Narrative    Pt states hx of attempted stent placement and internal bypass, states both surgeries unsuccessful.       ALLERGIES  Review of patient's allergies indicates no known allergies.    REVIEW OF SYSTEMS  Review of Systems   Constitutional: Negative for chills and fever.   HENT: Negative for sore throat and trouble swallowing.    Eyes: Positive for pain (pressure behind eyes) and visual disturbance (difficulty tracking and focusing on moving objects).   Respiratory: Negative for  cough and shortness of breath.    Cardiovascular: Negative for chest pain and leg swelling.   Gastrointestinal: Negative for abdominal pain, diarrhea and vomiting.   Genitourinary: Negative for decreased urine volume and frequency.   Musculoskeletal: Negative for neck pain.   Skin: Negative for rash.   Neurological: Negative for weakness and numbness.   All other systems reviewed and are negative.      PHYSICAL EXAM  ED Triage Vitals   Temp Heart Rate Resp BP SpO2   06/20/17 1359 06/20/17 1359 06/20/17 1359 06/20/17 1402 06/20/17 1359   97.6 °F (36.4 °C) 82 20 128/89 93 %      Temp src Heart Rate Source Patient Position BP Location FiO2 (%)   -- 06/20/17 1538 06/20/17 1402 06/20/17 1402 --    Monitor Sitting Right arm        Physical Exam   Constitutional: He is oriented to person, place, and time and well-developed, well-nourished, and in no distress.   HENT:   Head: Normocephalic and atraumatic.   Eyes: Right eye visual fields normal and left eye visual fields normal. EOM are normal. Pupils are equal, round, and reactive to light.   Visual Acuity: 20/25 R, L and both   Neck: Normal range of motion. Neck supple.   Cardiovascular: Normal rate, regular rhythm and normal heart sounds.    Pulmonary/Chest: Effort normal and breath sounds normal. No respiratory distress.   Abdominal: Soft. There is no tenderness. There is no rebound and no guarding.   Musculoskeletal: Normal range of motion. He exhibits no edema.   Neurological: He is alert and oriented to person, place, and time. He has normal sensation and normal strength.   No limb ataxia or focal weakness. Normal gait and normal heal to toe gait.    Skin: Skin is warm and dry.   Psychiatric: Mood and affect normal.   Nursing note and vitals reviewed.      LAB RESULTS  Lab Results (last 24 hours)     Procedure Component Value Units Date/Time    Urinalysis With / Culture If Indicated [[id number]]  (Normal) Collected:  06/20/17 1446    Specimen:  Urine from Urine, Clean  Catch Updated:  06/20/17 1503     Color, UA Yellow     Appearance, UA Clear     pH, UA 6.0     Specific Gravity, UA 1.024     Glucose, UA Negative     Ketones, UA Negative     Bilirubin, UA Negative     Blood, UA Negative     Protein, UA Negative     Leuk Esterase, UA Negative     Nitrite, UA Negative     Urobilinogen, UA 0.2 E.U./dL    Narrative:       Urine microscopic not indicated.    CBC & Differential [[id number]] Collected:  06/20/17 1454    Specimen:  Blood Updated:  06/20/17 1513    Narrative:       The following orders were created for panel order CBC & Differential.  Procedure                               Abnormality         Status                     ---------                               -----------         ------                     CBC Auto Differential[[id number]]         Abnormal            Final result                 Please view results for these tests on the individual orders.    Comprehensive Metabolic Panel [[id number]]  (Abnormal) Collected:  06/20/17 1454    Specimen:  Blood Updated:  06/20/17 1539     Glucose 93 mg/dL      BUN 13 mg/dL      Creatinine 1.17 mg/dL      Sodium 138 mmol/L      Potassium 4.4 mmol/L      Chloride 100 mmol/L      CO2 25.3 mmol/L      Calcium 9.2 mg/dL      Total Protein 7.7 g/dL      Albumin 4.50 g/dL      ALT (SGPT) 46 (H) U/L      AST (SGOT) 34 U/L      Alkaline Phosphatase 49 U/L      Total Bilirubin 0.7 mg/dL      eGFR Non African Amer 72 mL/min/1.73      Globulin 3.2 gm/dL      A/G Ratio 1.4 g/dL      BUN/Creatinine Ratio 11.1     Anion Gap 12.7 mmol/L     Lipase [[id number]]  (Normal) Collected:  06/20/17 1454    Specimen:  Blood Updated:  06/20/17 1539     Lipase 20 U/L     CBC Auto Differential [[id number]]  (Abnormal) Collected:  06/20/17 1454    Specimen:  Blood Updated:  06/20/17 1513     WBC 4.00 (L) 10*3/mm3      RBC 5.33 10*6/mm3      Hemoglobin 15.8 g/dL      Hematocrit 44.9 %      MCV 84.2 fL      MCH 29.6 pg      MCHC 35.2 g/dL      RDW 13.0 %      RDW-SD  39.7 fl      MPV 9.6 fL      Platelets 155 10*3/mm3      Neutrophil % 63.4 %      Lymphocyte % 24.5 %      Monocyte % 8.3 %      Eosinophil % 2.5 %      Basophil % 0.8 %      Immature Grans % 0.5 %      Neutrophils, Absolute 2.54 10*3/mm3      Lymphocytes, Absolute 0.98 10*3/mm3      Monocytes, Absolute 0.33 10*3/mm3      Eosinophils, Absolute 0.10 10*3/mm3      Basophils, Absolute 0.03 10*3/mm3      Immature Grans, Absolute 0.02 10*3/mm3       I ordered the above labs and reviewed the results    RADIOLOGY  MRI Brain With & Without Contrast   Final Result   Normal MRI of the brain with and without contrast.       This report was finalized on 6/20/2017 6:02 PM by Dr. Harry Chew MD.          I ordered the above noted radiological studies. Interpreted by radiologist. Discussed with radiologist (Dr. Chew). Reviewed by me in PACS.       PROCEDURES  Procedures      PROGRESS AND CONSULTS  ED Course     3:57 PM  Discussed with Pt that given his extensive medical history , MRI would be the most thorough exam to rule out progression of disease, although there is low suspicion that it will show anything. Pt agrees with plan and verbalized understanding.     4:00 PM  Ordered labs and MRI brain for further evaluation.     7:14 PM  Pt rechecked and is resting comfortably. BP- 145/84 HR- 61 Temp- 97.6 °F (36.4 °C) O2 sat- 96%. All pertinent lab/imaging/EKG findings discussed including nothing acute seen on stroke. Discussed with Pt that it is difficult at this time to determine the exact cause of his sx and advised Pt to follow up with his neurologist. Assured Pt that there is nothing emergent going on at this time.  Pt/family verbalized understanding and agree with plan to discharge.. All questions and concerns addressed at this time.         MEDICAL DECISION MAKING  Results were reviewed/discussed with the patient and they were also made aware of online access. Pt also made aware that some labs, such as cultures, will not  be resulted during ER visit and follow up with PMD is necessary.     MDM  Number of Diagnoses or Management Options     Amount and/or Complexity of Data Reviewed  Clinical lab tests: reviewed and ordered (Labs unremarkable )  Tests in the radiology section of CPT®: ordered and reviewed (MRI Brain: Negative acute)  Decide to obtain previous medical records or to obtain history from someone other than the patient: yes  Review and summarize past medical records: yes (Pt called his PCP pta and was sent to the ED. Dr Georges saw Pt 4/24/17 with diffuse large B cell lymphoma dx in 2011. Pt has had CHOP with Rituxin and is currently in remission.   Pt has a hx of upper extremity DVT in the past as well as bilateral pleural effusion.   Pt's recent CT chest/abd/pelvis shows an increased in mediastinal mass.   Pt recently had an occipital node with a negative biopsy.   Pt has chronic shortness of breath, hx of migraines, hx of superior vena cava syndrome. Pt had an MRI in the past that was negative. )  Independent visualization of images, tracings, or specimens: yes    Patient Progress  Patient progress: stable         DIAGNOSIS  Final diagnoses:   Nonintractable headache, unspecified chronicity pattern, unspecified headache type       DISPOSITION  Disposition: Discharged.    Patient discharged in stable condition.    Reviewed implications of results, diagnosis, meds, responsibility to follow up, warning signs and symptoms of possible worsening, potential complications and reasons to return to ER.    Patient/Family voiced understanding of above instructions.    Discussed plan for discharge, as there is no emergent indication for admission.  Pt/family is agreeable and understands need for follow up and repeat testing.  Pt is aware that discharge does not mean that nothing is wrong but it indicates no emergency is present and they must continue care with follow-up as given below or physician of their choice.     FOLLOW-UP  No  Known Provider  [facility] [address]  [phone]    In 1 week  Return if pain worsens, If symptoms worsen, shortness of breath, fever, any concerns         Medication List      Stop          predniSONE 50 MG tablet   Commonly known as:  DELTASONE       promethazine 25 MG tablet   Commonly known as:  PHENERGAN             Latest Documented Vital Signs:  As of 10:29 PM  BP- 132/89 HR- 71 Temp- 97.6 °F (36.4 °C) O2 sat- 98%    --  Documentation assistance provided by kirsty Xavier for Dr. Gonzalez.  Information recorded by the scribe was done at my direction and has been verified and validated by me.       Kaelyn Xavier  06/20/17 1934       Octaviano Gonzalez MD  06/20/17 1160

## 2017-06-20 NOTE — DISCHARGE INSTRUCTIONS
Follow up with your eye doctor and neurologist and [facility] in next 1-2 days. Return if any concerns wood worsening symptoms.

## 2017-06-21 ENCOUNTER — TELEPHONE (OUTPATIENT)
Dept: GENERAL RADIOLOGY | Facility: HOSPITAL | Age: 32
End: 2017-06-21

## 2017-06-21 ENCOUNTER — TELEPHONE (OUTPATIENT)
Dept: ONCOLOGY | Facility: CLINIC | Age: 32
End: 2017-06-21

## 2017-06-21 NOTE — TELEPHONE ENCOUNTER
I called patient and he still complains of fullness in the head but better and some nausea. He went to ER the other day and MRI BRAIN has been negative. It could be secondary to his SVC syndrome that he has these symptoms. He has been seen by vascular as well in past ans cardiology as well and he could not get any stents forS VC secondary to radiation to chest in past and scar tissue.DW him that he needs to be likaely referred to [facility]. As his symptoms are chronic and not improving. Told him if symptoms get worse he should go to ER.. HE WILL BE SEEN BY NP ON 23RD IN OUR OFFICE and has CT scans coming up in mid July. i will see him after CT  Done.  Ailyn Georges MD

## 2017-06-21 NOTE — TELEPHONE ENCOUNTER
Patient calling because he is still experincing same symptoms. He went to the ER and they did a scan but they sent him home because it was negative.   Reviewed with Dr Georges, add pt on to her schedule Friday morning at 8am. Message sent to scheduling to set this up and pt aware.

## 2017-06-21 NOTE — TELEPHONE ENCOUNTER
----- Message from Teodora Howe RN sent at 6/21/2017  2:34 PM EDT -----  Please schedule patient for 8am with Dr Georges 2 unit MD appt on Friday 23rd. CBC with appt. Call patient back to give exact time. Thanks

## 2017-06-22 ENCOUNTER — TELEPHONE (OUTPATIENT)
Dept: GENERAL RADIOLOGY | Facility: HOSPITAL | Age: 32
End: 2017-06-22

## 2017-06-22 NOTE — TELEPHONE ENCOUNTER
----- Message from Cecilia Anthony RN sent at 6/22/2017 11:18 AM EDT -----  The following message is from Dr. JAIN:    Please make sure patient has appointment with Chel Zaldivar tomorrow, i have allready talked to her about it. Make sure you call patient and let him know the time.     Ailyn Georges MD

## 2017-06-23 ENCOUNTER — OFFICE VISIT (OUTPATIENT)
Dept: ONCOLOGY | Facility: CLINIC | Age: 32
End: 2017-06-23

## 2017-06-23 ENCOUNTER — LAB (OUTPATIENT)
Dept: LAB | Facility: HOSPITAL | Age: 32
End: 2017-06-23

## 2017-06-23 VITALS
TEMPERATURE: 98.8 F | SYSTOLIC BLOOD PRESSURE: 122 MMHG | HEART RATE: 74 BPM | OXYGEN SATURATION: 99 % | WEIGHT: 223.4 LBS | HEIGHT: 70 IN | BODY MASS INDEX: 31.98 KG/M2 | RESPIRATION RATE: 12 BRPM | DIASTOLIC BLOOD PRESSURE: 86 MMHG

## 2017-06-23 DIAGNOSIS — C83.33 DIFFUSE LARGE B-CELL LYMPHOMA OF INTRA-ABDOMINAL LYMPH NODES (HCC): ICD-10-CM

## 2017-06-23 DIAGNOSIS — I87.1 SVC SYNDROME: Primary | ICD-10-CM

## 2017-06-23 DIAGNOSIS — C83.33 DIFFUSE LARGE B-CELL LYMPHOMA OF INTRA-ABDOMINAL LYMPH NODES (HCC): Primary | ICD-10-CM

## 2017-06-23 LAB
ALBUMIN SERPL-MCNC: 4.4 G/DL (ref 3.5–5.2)
ALBUMIN/GLOB SERPL: 1.5 G/DL (ref 1.1–2.4)
ALP SERPL-CCNC: 55 U/L (ref 38–116)
ALT SERPL W P-5'-P-CCNC: 36 U/L (ref 0–41)
ANION GAP SERPL CALCULATED.3IONS-SCNC: 12.4 MMOL/L
AST SERPL-CCNC: 19 U/L (ref 0–40)
BASOPHILS # BLD AUTO: 0.03 10*3/MM3 (ref 0–0.1)
BASOPHILS NFR BLD AUTO: 0.7 % (ref 0–1.1)
BILIRUB SERPL-MCNC: 0.6 MG/DL (ref 0.1–1.2)
BUN BLD-MCNC: 11 MG/DL (ref 6–20)
BUN/CREAT SERPL: 9.8 (ref 7.3–30)
CALCIUM SPEC-SCNC: 9 MG/DL (ref 8.5–10.2)
CHLORIDE SERPL-SCNC: 100 MMOL/L (ref 98–107)
CO2 SERPL-SCNC: 27.6 MMOL/L (ref 22–29)
CREAT BLD-MCNC: 1.12 MG/DL (ref 0.7–1.3)
DEPRECATED RDW RBC AUTO: 38 FL (ref 37–49)
EOSINOPHIL # BLD AUTO: 0.11 10*3/MM3 (ref 0–0.36)
EOSINOPHIL NFR BLD AUTO: 2.4 % (ref 1–5)
ERYTHROCYTE [DISTWIDTH] IN BLOOD BY AUTOMATED COUNT: 12.3 % (ref 11.7–14.5)
GFR SERPL CREATININE-BSD FRML MDRD: 76 ML/MIN/1.73
GLOBULIN UR ELPH-MCNC: 3 GM/DL (ref 1.8–3.5)
GLUCOSE BLD-MCNC: 90 MG/DL (ref 74–124)
HCT VFR BLD AUTO: 45.4 % (ref 40–49)
HGB BLD-MCNC: 15.6 G/DL (ref 13.5–16.5)
HOLD SPECIMEN: NORMAL
IMM GRANULOCYTES # BLD: 0.01 10*3/MM3 (ref 0–0.03)
IMM GRANULOCYTES NFR BLD: 0.2 % (ref 0–0.5)
LYMPHOCYTES # BLD AUTO: 1.25 10*3/MM3 (ref 1–3.5)
LYMPHOCYTES NFR BLD AUTO: 27.5 % (ref 20–49)
MCH RBC QN AUTO: 29.3 PG (ref 27–33)
MCHC RBC AUTO-ENTMCNC: 34.4 G/DL (ref 32–35)
MCV RBC AUTO: 85.3 FL (ref 83–97)
MONOCYTES # BLD AUTO: 0.29 10*3/MM3 (ref 0.25–0.8)
MONOCYTES NFR BLD AUTO: 6.4 % (ref 4–12)
NEUTROPHILS # BLD AUTO: 2.86 10*3/MM3 (ref 1.5–7)
NEUTROPHILS NFR BLD AUTO: 62.8 % (ref 39–75)
NRBC BLD MANUAL-RTO: 0 /100 WBC (ref 0–0)
PLATELET # BLD AUTO: 159 10*3/MM3 (ref 150–375)
PMV BLD AUTO: 9.3 FL (ref 8.9–12.1)
POTASSIUM BLD-SCNC: 4.1 MMOL/L (ref 3.5–4.7)
PROT SERPL-MCNC: 7.4 G/DL (ref 6.3–8)
RBC # BLD AUTO: 5.32 10*6/MM3 (ref 4.3–5.5)
SODIUM BLD-SCNC: 140 MMOL/L (ref 134–145)
WBC NRBC COR # BLD: 4.55 10*3/MM3 (ref 4–10)

## 2017-06-23 PROCEDURE — 85025 COMPLETE CBC W/AUTO DIFF WBC: CPT

## 2017-06-23 PROCEDURE — 99214 OFFICE O/P EST MOD 30 MIN: CPT | Performed by: NURSE PRACTITIONER

## 2017-06-23 PROCEDURE — 80053 COMPREHEN METABOLIC PANEL: CPT

## 2017-06-23 PROCEDURE — 36415 COLL VENOUS BLD VENIPUNCTURE: CPT

## 2017-06-23 RX ORDER — PREDNISONE 10 MG/1
TABLET ORAL
Qty: 21 TABLET | Refills: 0 | Status: SHIPPED | OUTPATIENT
Start: 2017-06-23 | End: 2017-09-11

## 2017-06-23 RX ORDER — OMEPRAZOLE 20 MG/1
20 CAPSULE, DELAYED RELEASE ORAL DAILY
Qty: 30 CAPSULE | Refills: 1 | Status: SHIPPED | OUTPATIENT
Start: 2017-06-23 | End: 2017-12-15

## 2017-06-23 NOTE — PROGRESS NOTES
"  Subjective .     REASONS FOR FOLLOWUP:   1. Diffuse large B-cell lymphoma diagnosed in 2011 status post chemotherapy with 8 cycles of CHOP Rituxan with complete remission.     2.  History of upper extremity DVTs for which she was on anticoagulation but it causes severe epistaxis. He does not tolerate aspirin either and hence not on anti-correlation.     3.  He has had bilateral pleural effusions for which she has had several thoracocentesis in the past.     4.  Status post excisional biopsy of left occipital lymph node.     5. 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    HISTORY OF PRESENT ILLNESS:  The patient is a 32 y.o. year old male who is here for follow-up with the above-mentioned history.    History of Present Illness  patient is a 32-year-old male with the above-mentioned history here today with complaints of feeling \"foggy headed\" for the past 4 weeks.  He was actually sent to the emergency room on 6/20/2017 because of this complaint.  He underwent an MRI of the brain that day, which was normal.  His symptoms have not improved since then.  He states that he has had some blurry vision as well as \"eye fatigue\".  He states that when object or people are moving he will have difficulty focusing while he is tracking them.  He has been seen by an ophthalmologist, who advised the patient had degenerative changes and was advised to wear readers, which did not significantly help his symptoms.  He denies headaches, but states he has had some mild pressure behind his eyes and some mild sinus pressure.  He has been taking Tylenol Cold and sinus, and feels those symptoms are improving.  He denies fevers or chills.  He denies night sweats.  He has had a weight loss of a few pounds, but reports he has been eating better.  He denies any palpable masses or nodularity.  He reports he is breathing well denies any shortness of breath or cough.  He has noticed that his reflux has an " worse the past 2 weeks.  He is currently taking Zantac 150 mg twice daily.  He is also having some intermittent nausea issues because of the reflux.      Past Medical History:   Diagnosis Date   • GERD (gastroesophageal reflux disease)    • Lymphoma     lymphoma   • Superior vena cava syndrome     has blockage to the brachiocephalics both sides   • TIA (transient ischemic attack)        ONCOLOGIC HISTORY:  (History from previous dates can be found in the separate document.)  32-year-old white male who has a remote history of non-Hodgkin's lymphoma, diffuse large cell from the mediastinum that [facility]. He presented with superior vena cava syndrome and a mass that was occluding the superior vena cava. He had a [place] procedure to make the diagnosis and he ended up with pleural effusion, chest tubes in place and finally chemotherapy with 8 cycles of CHOP/Rituxan and subsequently radiation therapy to the mediastinum. He had issues in regard to superior vena cava syndrome on a chronic basis that unfortunately has not had any proper resolution because there is no resolution for the kind of circumstance that he has anyway. He has taken anticoagulants before because of previous thrombosis in the upper extremities veins, but he is not taking this anymore because of frequent epistaxis. In any event, for the last 2 weeks the patient has had nocturnal sweats and he has felt a left occipital lymph node coming and going. He has not had any alterations in appetite or weight, he has not had any fever and he has not had any rashes in the skin. He has not had any modification in cough, sputum production or shortness of breath, no pain in the chest, normal bowel activity, normal urination, no alteration in the penis, no alteration in the testicles and no alterations in the skin and no neurological dysfunction. He denies any other symptomatology related to  lymphoma. The last time he was seen by the Hematology/Oncology Team at [facility] was a year ago on clinical grounds with no CT scans.       He was admitted yesterday due to symptoms as above and a CT scan documented a retrosternal mass, precardiac mass; we do not know if this a chronic or an acute new issue     Ct scan: 2/25/2070     There is a 5.8 x 3.2 x 6.3 cm (this mass was previously measured at 4.8  x 2.7 cm on the study of 1/9/2014) anterior mediastinal mass within the  superior mediastinum just behind the sternum predominantly to the right  of midline which posteriorly abuts the ascending aorta and the aortic  arch and causes complete occlusion of the upper portion of the superior  vena cava, the superior vena cava reconstitutes at the level of entry of  the azygos vein.       2.1 cm new right supraclavicular lymph node was not mentioned on the  previous study of 1/9/2014. Prominent collateral veins are seen  extending from the internal mammary veins and right subclavian vein into  the anterior chest wall predominantly to the right of midline.      No consolidation or effusion. Pleural-based 0.5 cm nodule in the right  middle lobe.      Upper abdominal viscera are essentially unremarkable.     CT abdomen pelvis 2/27 negative  PET scan 3/2/17  IMPRESSION:  1. Infiltrative anterior mediastinal lymphadenopathy has low-level  activity. There is otherwise no hypermetabolic lymphadenopathy.  2. Low level activity at the surgical bed at the posterior subcutaneous  soft tissues of the neck on the left.    Path on occipital to lymph node is negative    Discussion done in the multidisciplinary clinic in the lung cancer conference and patient not a candidate to do a mediastinoscopy again.  Given low uptake it was felt whether this could be just a residual scar tissue from his previous SVC syndrome.  However a decision was made to see if he can do a bone marrow and repeat CT scan in 2 months in  order to follow-up this 6.3 cm mediastinal mass.    We have tried to contact Dr. Conner on several locations and we will recheck today.    Bone marrow aspiration biopsy 3/21/2017 shows slightly hypocellular marrow with maturing trilineage hematopoiesis but no evidence of any lymphoma, leukemia.  Cytogenetics is pending.  Flow is negative as well.      No current facility-administered medications on file prior to encounter.        Current Outpatient Prescriptions on File Prior to Visit   Medication Sig Dispense Refill   • ibuprofen (ADVIL,MOTRIN) 200 MG tablet Take 400 mg by mouth Every 6 (Six) Hours As Needed for Mild Pain (1-3).     • meclizine (ANTIVERT) 50 MG tablet Take 0.5 tablets by mouth 3 (three) times a day as needed for dizziness for up to 20 doses. 20 tablet 0   • predniSONE (DELTASONE) 50 MG tablet Pt is to take 50mg 13 hours prior to CT scan, 7 hours prior to CT scan and 1 hour prior to CT scan 3 tablet 0   • promethazine (PHENERGAN) 25 MG tablet Take 1 tablet by mouth Every 6 (Six) Hours As Needed for nausea or vomiting. 30 tablet 0   • raNITIdine (ZANTAC) 150 MG tablet Take 150 mg by mouth 2 (Two) Times a Day.       No current facility-administered medications on file prior to visit.        ALLERGIES:   No Known Allergies    Social History     Social History   • Marital status: Single     Spouse name: N/A   • Number of children: N/A   • Years of education: College     Occupational History   • Physical therapy tech [employer]     Social History Main Topics   • Smoking status: Never Smoker   • Smokeless tobacco: Not on file   • Alcohol use Yes      Comment: occasional   • Drug use: No   • Sexual activity: Not on file     Other Topics Concern   • Not on file     Social History Narrative    Pt states hx of attempted stent placement and internal bypass, states both surgeries unsuccessful.         Cancer-related family history includes Cancer in his maternal grandfather, maternal grandmother,  "paternal grandfather, and paternal grandmother.     Review of Systems   Constitutional: Negative.  Negative for activity change, appetite change, chills, diaphoresis, fever and unexpected weight change.   HENT: Negative.  Negative for mouth sores and trouble swallowing.    Eyes: Positive for visual disturbance.   Respiratory: Negative.  Negative for cough and shortness of breath.    Cardiovascular: Negative.  Negative for chest pain and leg swelling.   Gastrointestinal: Positive for nausea. Negative for abdominal pain, constipation, diarrhea and vomiting.        See HPI     Genitourinary: Negative.  Negative for difficulty urinating.   Musculoskeletal: Negative.  Negative for arthralgias and joint swelling.   Skin: Negative.  Negative for rash.   Neurological: Negative.         See HPI     Hematological: Negative.  Negative for adenopathy. Does not bruise/bleed easily.   Psychiatric/Behavioral: Negative.  Negative for sleep disturbance.       Objective      Vitals:    06/23/17 1315   BP: 122/86   Pulse: 74   Resp: 12   Temp: 98.8 °F (37.1 °C)   TempSrc: Oral   SpO2: 99%   Weight: 223 lb 6.4 oz (101 kg)   Height: 70.47\" (179 cm)   PainSc: 0-No pain     Current Status 6/23/2017   ECOG score 0       Physical Exam   Constitutional: He is oriented to person, place, and time. He appears well-developed and well-nourished.   HENT:   Head: Normocephalic and atraumatic.   Nose: Nose normal.   Mouth/Throat: Oropharynx is clear and moist and mucous membranes are normal. No oropharyngeal exudate.   Eyes: Pupils are equal, round, and reactive to light.   Neck: Normal range of motion. Neck supple.   Cardiovascular: Normal rate, regular rhythm and normal heart sounds.    Pulmonary/Chest: Effort normal and breath sounds normal.   Abdominal: Soft. Normal appearance and bowel sounds are normal. He exhibits no distension. There is no hepatosplenomegaly. There is no tenderness.   Musculoskeletal: Normal range of motion. "   Lymphadenopathy:     He has no cervical adenopathy.        Right: No supraclavicular adenopathy present.        Left: No supraclavicular adenopathy present.   Neurological: He is alert and oriented to person, place, and time.   Skin: Skin is warm and dry.   Psychiatric: He has a normal mood and affect. His behavior is normal.   Nursing note and vitals reviewed.        RECENT LABS:  Hematology WBC   Date Value Ref Range Status   06/23/2017 4.55 4.00 - 10.00 10*3/mm3 Final     RBC   Date Value Ref Range Status   06/23/2017 5.32 4.30 - 5.50 10*6/mm3 Final     Hemoglobin   Date Value Ref Range Status   06/23/2017 15.6 13.5 - 16.5 g/dL Final     Hematocrit   Date Value Ref Range Status   06/23/2017 45.4 40.0 - 49.0 % Final     Platelets   Date Value Ref Range Status   06/23/2017 159 150 - 375 10*3/mm3 Final          Lab Results   Component Value Date    GLUCOSE 90 06/23/2017    BUN 11 06/23/2017    CREATININE 1.12 06/23/2017    EGFRIFNONA 76 06/23/2017    BCR 9.8 06/23/2017    K 4.1 06/23/2017    CO2 27.6 06/23/2017    CALCIUM 9.0 06/23/2017    ALBUMIN 4.40 06/23/2017    LABIL2 1.5 06/23/2017    AST 19 06/23/2017    ALT 36 06/23/2017                 Assessment/Plan   1.  Diffuse large B-cell lymphoma status post CHOP Rituxan in 2011 with a complete remission. He follows up with Dr. Con[facility] at [facility] and saw him last year. We will need to get the records from there and I have requested it.   Patient had a PET/CT scan on 3/2/2017.  It showed infiltrative anterior mediastinal lymphadenopathy has low-level activity. There is otherwise no hypermetabolic lymphadenopathy.  2. Low level activity at the surgical bed at the posterior subcutaneous  soft tissues of the neck on the left.     2. New onset shortness of breath with night sweats, the scan shows 5.8 into 6.3-3.2 cm mass in the anterior mediastinum within the superior mediastinum just behind the sternum predominantly on the right of the midline which  abuts the ascending aorta and aortic arch and causes of occlusion of the upper portion of the superior vena cava. Patient also has 2.1 cm right supraclavicular lymph node there is a pleural based 0.57 with a nodule in the right middle lobe.  Given pathology is negative we will need to refer patient to thoracic oncology to see if a mediastinoscopy with biopsy can be obtained in order to get the diagnosis.  If this turns out to be diffuse large B-cell lymphoma he would require salvage chemotherapy followed by autologous stem cell transplant.    Given that repeat mediastinoscopy is difficult in this situation, a decision was made to consider bone marrow aspiration and biopsy which is also negative.  Patient was referred to pulmonology.      3.  Patient with a history of SVC syndrome with prior failed SVC bypass.  He is now having nonsystemic neurologic symptoms.  He is feeling foggy headed and having some visual changes.  He had an MRI of the brain was negative.  He is currently scheduled for CT scans in July, however given his changes we will move those scans up to hopefully early next week.  We'll have the patient return for follow-up with Dr. Georges towards the end of next week to review the scans.  We will also start him on a course of prednisone 20 mg daily for one week, followed by 10 mg daily for one week.  I will also prescribed omeprazole 20 mg daily for the patient's reflux symptoms.  We will also refer him to Dr. Bimal Rich with vascular surgery for evaluation.    Plan     1.  Prednisone 20 mg daily ×1 week, followed by 10 mg daily for one week then discontinue.  2.  CT scan of the neck, chest, abdomen, pelvis to be moved up to early next week.  3.  Refer patient to see Dr. Bimal Muhammad for SVC syndrome  4.  Patient return for follow-up with Dr. Georges in about one week to review his CT scans, and further discuss plan of care.  5.  Omeprazole 20 mg daily       DAVE Garcia                     Cc:  Librado Valdes*

## 2017-06-26 ENCOUNTER — HOSPITAL ENCOUNTER (OUTPATIENT)
Dept: PET IMAGING | Facility: HOSPITAL | Age: 32
Discharge: HOME | End: 2017-06-26
Attending: INTERNAL MEDICINE | Admitting: INTERNAL MEDICINE

## 2017-06-26 ENCOUNTER — LAB (OUTPATIENT)
Dept: LAB | Facility: HOSPITAL | Age: 32
End: 2017-06-26

## 2017-06-26 DIAGNOSIS — C83.33 DIFFUSE LARGE B-CELL LYMPHOMA OF INTRA-ABDOMINAL LYMPH NODES (HCC): ICD-10-CM

## 2017-06-26 DIAGNOSIS — I87.1 SVC SYNDROME: ICD-10-CM

## 2017-06-26 LAB
ALBUMIN SERPL-MCNC: 4.7 G/DL (ref 3.5–5.2)
ALBUMIN/GLOB SERPL: 1.6 G/DL (ref 1.1–2.4)
ALP SERPL-CCNC: 51 U/L (ref 38–116)
ALT SERPL W P-5'-P-CCNC: 24 U/L (ref 0–41)
ANION GAP SERPL CALCULATED.3IONS-SCNC: 12.5 MMOL/L
AST SERPL-CCNC: 14 U/L (ref 0–40)
BASOPHILS # BLD AUTO: 0.03 10*3/MM3 (ref 0–0.1)
BASOPHILS NFR BLD AUTO: 0.6 % (ref 0–1.1)
BILIRUB SERPL-MCNC: 0.7 MG/DL (ref 0.1–1.2)
BUN BLD-MCNC: 13 MG/DL (ref 6–20)
BUN/CREAT SERPL: 13.5 (ref 7.3–30)
CALCIUM SPEC-SCNC: 9.3 MG/DL (ref 8.5–10.2)
CHLORIDE SERPL-SCNC: 100 MMOL/L (ref 98–107)
CO2 SERPL-SCNC: 27.5 MMOL/L (ref 22–29)
CREAT BLD-MCNC: 0.96 MG/DL (ref 0.7–1.3)
CREAT BLDA-MCNC: 1 MG/DL (ref 0.6–1.3)
DEPRECATED RDW RBC AUTO: 40.4 FL (ref 37–49)
EOSINOPHIL # BLD AUTO: 0.01 10*3/MM3 (ref 0–0.36)
EOSINOPHIL NFR BLD AUTO: 0.2 % (ref 1–5)
ERYTHROCYTE [DISTWIDTH] IN BLOOD BY AUTOMATED COUNT: 12.8 % (ref 11.7–14.5)
GFR SERPL CREATININE-BSD FRML MDRD: 91 ML/MIN/1.73
GLOBULIN UR ELPH-MCNC: 2.9 GM/DL (ref 1.8–3.5)
GLUCOSE BLD-MCNC: 103 MG/DL (ref 74–124)
HCT VFR BLD AUTO: 49.2 % (ref 40–49)
HGB BLD-MCNC: 16.4 G/DL (ref 13.5–16.5)
IMM GRANULOCYTES # BLD: 0.04 10*3/MM3 (ref 0–0.03)
IMM GRANULOCYTES NFR BLD: 0.8 % (ref 0–0.5)
LDH SERPL-CCNC: 172 U/L (ref 99–259)
LYMPHOCYTES # BLD AUTO: 0.76 10*3/MM3 (ref 1–3.5)
LYMPHOCYTES NFR BLD AUTO: 15.5 % (ref 20–49)
MCH RBC QN AUTO: 29.2 PG (ref 27–33)
MCHC RBC AUTO-ENTMCNC: 33.3 G/DL (ref 32–35)
MCV RBC AUTO: 87.5 FL (ref 83–97)
MONOCYTES # BLD AUTO: 0.17 10*3/MM3 (ref 0.25–0.8)
MONOCYTES NFR BLD AUTO: 3.5 % (ref 4–12)
NEUTROPHILS # BLD AUTO: 3.88 10*3/MM3 (ref 1.5–7)
NEUTROPHILS NFR BLD AUTO: 79.4 % (ref 39–75)
NRBC BLD MANUAL-RTO: 0 /100 WBC (ref 0–0)
PLATELET # BLD AUTO: 170 10*3/MM3 (ref 150–375)
PMV BLD AUTO: 9.9 FL (ref 8.9–12.1)
POTASSIUM BLD-SCNC: 3.9 MMOL/L (ref 3.5–4.7)
PROT SERPL-MCNC: 7.6 G/DL (ref 6.3–8)
RBC # BLD AUTO: 5.62 10*6/MM3 (ref 4.3–5.5)
SODIUM BLD-SCNC: 140 MMOL/L (ref 134–145)
WBC NRBC COR # BLD: 4.89 10*3/MM3 (ref 4–10)

## 2017-06-26 PROCEDURE — 74177 CT ABD & PELVIS W/CONTRAST: CPT

## 2017-06-26 PROCEDURE — 70491 CT SOFT TISSUE NECK W/DYE: CPT

## 2017-06-26 PROCEDURE — 80053 COMPREHEN METABOLIC PANEL: CPT

## 2017-06-26 PROCEDURE — 36415 COLL VENOUS BLD VENIPUNCTURE: CPT

## 2017-06-26 PROCEDURE — 0 DIATRIZOATE MEGLUMINE & SODIUM PER 1 ML: Performed by: INTERNAL MEDICINE

## 2017-06-26 PROCEDURE — 71260 CT THORAX DX C+: CPT

## 2017-06-26 PROCEDURE — 0 IOPAMIDOL 61 % SOLUTION: Performed by: INTERNAL MEDICINE

## 2017-06-26 PROCEDURE — 83615 LACTATE (LD) (LDH) ENZYME: CPT

## 2017-06-26 PROCEDURE — 85025 COMPLETE CBC W/AUTO DIFF WBC: CPT

## 2017-06-26 PROCEDURE — 82565 ASSAY OF CREATININE: CPT

## 2017-06-26 RX ADMIN — DIATRIZOATE MEGLUMINE AND DIATRIZOATE SODIUM 30 ML: 660; 100 LIQUID ORAL; RECTAL at 12:31

## 2017-06-26 RX ADMIN — IOPAMIDOL 85 ML: 612 INJECTION, SOLUTION INTRAVENOUS at 12:32

## 2017-06-30 ENCOUNTER — APPOINTMENT (OUTPATIENT)
Dept: LAB | Facility: HOSPITAL | Age: 32
End: 2017-06-30

## 2017-06-30 ENCOUNTER — OFFICE VISIT (OUTPATIENT)
Dept: ONCOLOGY | Facility: CLINIC | Age: 32
End: 2017-06-30

## 2017-06-30 VITALS
DIASTOLIC BLOOD PRESSURE: 84 MMHG | TEMPERATURE: 98.5 F | RESPIRATION RATE: 14 BRPM | HEIGHT: 70 IN | OXYGEN SATURATION: 97 % | BODY MASS INDEX: 32.13 KG/M2 | HEART RATE: 72 BPM | WEIGHT: 224.4 LBS | SYSTOLIC BLOOD PRESSURE: 122 MMHG

## 2017-06-30 DIAGNOSIS — C83.30 DIFFUSE LARGE B-CELL LYMPHOMA, UNSPECIFIED BODY REGION (HCC): Primary | ICD-10-CM

## 2017-06-30 DIAGNOSIS — I87.1 SVC SYNDROME: ICD-10-CM

## 2017-06-30 PROCEDURE — 99214 OFFICE O/P EST MOD 30 MIN: CPT | Performed by: INTERNAL MEDICINE

## 2017-06-30 PROCEDURE — G0463 HOSPITAL OUTPT CLINIC VISIT: HCPCS | Performed by: INTERNAL MEDICINE

## 2017-06-30 NOTE — PROGRESS NOTES
"  Subjective .     REASONS FOR FOLLOWUP:   1. Diffuse large B-cell lymphoma diagnosed in 2011 status post chemotherapy with 8 cycles of CHOP Rituxan with complete remission.     2.  History of upper extremity DVTs for which she was on anticoagulation but it causes severe epistaxis. He does not tolerate aspirin either and hence not on anti-correlation.     3.  He has had bilateral pleural effusions for which she has had several thoracocentesis in the past.     4.  Status post excisional biopsy of left occipital lymph node.     5. 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    HISTORY OF PRESENT ILLNESS:  The patient is a 32 y.o. year old male who is here for follow-up with the above-mentioned history.    History of Present Illness  patient is a 32-year-old male with the above-mentioned history here today with complaints of feeling \"foggy headed\" for the past 4 weeks.  He was actually sent to the emergency room on 6/20/2017 because of this complaint.  He underwent an MRI of the brain that day, which was normal.  His symptoms have not improved since then.  He states that he has had some blurry vision as well as \"eye fatigue\".  He states that when object or people are moving he will have difficulty focusing while he is tracking them.  He has been seen by an ophthalmologist, who advised the patient had degenerative changes and was advised to wear readers, which did not significantly help his symptoms.  He denies headaches, but states he has had some mild pressure behind his eyes and some mild sinus pressure.  He has been taking Tylenol Cold and sinus, and feels those symptoms are improving.  He denies fevers or chills.  He denies night sweats.  He has had a weight loss of a few pounds, but reports he has been eating better.  He denies any palpable masses or nodularity.  He reports he is breathing well denies any shortness of breath or cough.  He has noticed that his reflux has an " worse the past 2 weeks.  He is currently taking Zantac 150 mg twice daily.  He is also having some intermittent nausea issues because of the reflux.    Patient states that since we started him on steroids 2 weeks ago by Kayley he is feeling better is currently on 20 mg daily and is to taper to 10 mg daily.  He has less fogginess now.  I believe the feeling of fullness in the head and fogginess is secondary to SVC syndrome.  He has a follow-up appointment with Dr. Bimal Rich to see if any further treatments like stents can be done for the SVC syndrome.    We had ordered a repeat CT scan of the neck chest abdomen pelvis and there is no change in the mediastinal mass and he is stable.    Past Medical History:   Diagnosis Date   • GERD (gastroesophageal reflux disease)    • Lymphoma     lymphoma   • Superior vena cava syndrome     has blockage to the brachiocephalics both sides   • TIA (transient ischemic attack)        ONCOLOGIC HISTORY:  (History from previous dates can be found in the separate document.)  32-year-old white male who has a remote history of non-Hodgkin's lymphoma, diffuse large cell from the mediastinum that was originally diagnosed and treated at [facility] 4 years ago. He presented with superior vena cava syndrome and a mass that was occluding the superior vena cava. He had a [place] procedure to make the diagnosis and he ended up with pleural effusion, chest tubes in place and finally chemotherapy with 8 cycles of CHOP/Rituxan and subsequently radiation therapy to the mediastinum. He had issues in regard to superior vena cava syndrome on a chronic basis that unfortunately has not had any proper resolution because there is no resolution for the kind of circumstance that he has anyway. He has taken anticoagulants before because of previous thrombosis in the upper extremities veins, but he is not taking this anymore because of frequent epistaxis. In any event, for the last 2  weeks the patient has had nocturnal sweats and he has felt a left occipital lymph node coming and going. He has not had any alterations in appetite or weight, he has not had any fever and he has not had any rashes in the skin. He has not had any modification in cough, sputum production or shortness of breath, no pain in the chest, normal bowel activity, normal urination, no alteration in the penis, no alteration in the testicles and no alterations in the skin and no neurological dysfunction. He denies any other symptomatology related to lymphoma. The last time he was seen by the Hematology/Oncology Team at [facility] was a year ago on clinical grounds with no CT scans.       He was admitted yesterday due to symptoms as above and a CT scan documented a retrosternal mass, precardiac mass; we do not know if this a chronic or an acute new issue     Ct scan: 2/25/2070     There is a 5.8 x 3.2 x 6.3 cm (this mass was previously measured at 4.8  x 2.7 cm on the study of 1/9/2014) anterior mediastinal mass within the  superior mediastinum just behind the sternum predominantly to the right  of midline which posteriorly abuts the ascending aorta and the aortic  arch and causes complete occlusion of the upper portion of the superior  vena cava, the superior vena cava reconstitutes at the level of entry of  the azygos vein.       2.1 cm new right supraclavicular lymph node was not mentioned on the  previous study of 1/9/2014. Prominent collateral veins are seen  extending from the internal mammary veins and right subclavian vein into  the anterior chest wall predominantly to the right of midline.      No consolidation or effusion. Pleural-based 0.5 cm nodule in the right  middle lobe.      Upper abdominal viscera are essentially unremarkable.     CT abdomen pelvis 2/27 negative  PET scan 3/2/17  IMPRESSION:  1. Infiltrative anterior mediastinal lymphadenopathy has low-level  activity. There is otherwise no  hypermetabolic lymphadenopathy.  2. Low level activity at the surgical bed at the posterior subcutaneous  soft tissues of the neck on the left.    Path on occipital to lymph node is negative    Discussion done in the multidisciplinary clinic in the lung cancer conference and patient not a candidate to do a mediastinoscopy again.  Given low uptake it was felt whether this could be just a residual scar tissue from his previous SVC syndrome.  However a decision was made to see if he can do a bone marrow and repeat CT scan in 2 months in order to follow-up this 6.3 cm mediastinal mass.    We have tried to contact Dr. Conner on several locations and we will recheck today.    Bone marrow aspiration biopsy 3/21/2017 shows slightly hypocellular marrow with maturing trilineage hematopoiesis but no evidence of any lymphoma, leukemia.  Cytogenetics is pending.  Flow is negative as well.      No current facility-administered medications on file prior to encounter.        Current Outpatient Prescriptions on File Prior to Visit   Medication Sig Dispense Refill   • ibuprofen (ADVIL,MOTRIN) 200 MG tablet Take 400 mg by mouth Every 6 (Six) Hours As Needed for Mild Pain (1-3).     • meclizine (ANTIVERT) 50 MG tablet Take 0.5 tablets by mouth 3 (three) times a day as needed for dizziness for up to 20 doses. 20 tablet 0   • omeprazole (priLOSEC) 20 MG capsule Take 1 capsule by mouth Daily. 30 capsule 1   • predniSONE (DELTASONE) 10 MG tablet 2 tabs by mouth daily for one week then one tablet by mouth for one week. Take in AM with food. 21 tablet 0   • [DISCONTINUED] raNITIdine (ZANTAC) 150 MG tablet Take 150 mg by mouth 2 (Two) Times a Day.       No current facility-administered medications on file prior to visit.        ALLERGIES:   No Known Allergies    Social History     Social History   • Marital status: Single     Spouse name: N/A   • Number of children: N/A   • Years of education: College     Occupational History   • Physical  "therapy tech Other [employer]     Social History Main Topics   • Smoking status: Never Smoker   • Smokeless tobacco: Not on file   • Alcohol use Yes      Comment: occasional   • Drug use: No   • Sexual activity: Not on file     Other Topics Concern   • Not on file     Social History Narrative    Pt states hx of attempted stent placement and internal bypass, states both surgeries unsuccessful.         Cancer-related family history includes Cancer in his maternal grandfather, maternal grandmother, paternal grandfather, and paternal grandmother.     Review of Systems   Constitutional: Negative.  Negative for activity change, appetite change, chills, diaphoresis, fever and unexpected weight change.   HENT: Negative.  Negative for mouth sores and trouble swallowing.    Eyes: Positive for visual disturbance.   Respiratory: Negative.  Negative for cough and shortness of breath.    Cardiovascular: Negative.  Negative for chest pain and leg swelling.   Gastrointestinal: Positive for nausea. Negative for abdominal pain, constipation, diarrhea and vomiting.        See HPI     Genitourinary: Negative.  Negative for difficulty urinating.   Musculoskeletal: Negative.  Negative for arthralgias and joint swelling.   Skin: Negative.  Negative for rash.   Neurological: Negative.         See HPI     Hematological: Negative.  Negative for adenopathy. Does not bruise/bleed easily.   Psychiatric/Behavioral: Negative.  Negative for sleep disturbance.       Objective      Vitals:    06/30/17 0922   BP: 122/84   Pulse: 72   Resp: 14   Temp: 98.5 °F (36.9 °C)   TempSrc: Oral   SpO2: 97%   Weight: 224 lb 6.4 oz (102 kg)   Height: 70.47\" (179 cm)   PainSc: 0-No pain     Current Status 6/30/2017   ECOG score 0       Physical Exam   Constitutional: He is oriented to person, place, and time. He appears well-developed and well-nourished.   HENT:   Head: Normocephalic and atraumatic.   Nose: Nose normal.   Mouth/Throat: Oropharynx is clear " and moist and mucous membranes are normal. No oropharyngeal exudate.   Eyes: Pupils are equal, round, and reactive to light.   Neck: Normal range of motion. Neck supple.   Cardiovascular: Normal rate, regular rhythm and normal heart sounds.    Pulmonary/Chest: Effort normal and breath sounds normal.   Abdominal: Soft. Normal appearance and bowel sounds are normal. He exhibits no distension. There is no hepatosplenomegaly. There is no tenderness.   Musculoskeletal: Normal range of motion.   Lymphadenopathy:     He has no cervical adenopathy.        Right: No supraclavicular adenopathy present.        Left: No supraclavicular adenopathy present.   Neurological: He is alert and oriented to person, place, and time.   Skin: Skin is warm and dry.   Psychiatric: He has a normal mood and affect. His behavior is normal.   Nursing note and vitals reviewed.        RECENT LABS:  Hematology WBC   Date Value Ref Range Status   06/26/2017 4.89 4.00 - 10.00 10*3/mm3 Final     RBC   Date Value Ref Range Status   06/26/2017 5.62 (H) 4.30 - 5.50 10*6/mm3 Final     Hemoglobin   Date Value Ref Range Status   06/26/2017 16.4 13.5 - 16.5 g/dL Final     Hematocrit   Date Value Ref Range Status   06/26/2017 49.2 (H) 40.0 - 49.0 % Final     Platelets   Date Value Ref Range Status   06/26/2017 170 150 - 375 10*3/mm3 Final          Lab Results   Component Value Date    GLUCOSE 103 06/26/2017    BUN 13 06/26/2017    CREATININE 1.00 06/26/2017    EGFRIFNONA 91 06/26/2017    BCR 13.5 06/26/2017    K 3.9 06/26/2017    CO2 27.5 06/26/2017    CALCIUM 9.3 06/26/2017    ALBUMIN 4.70 06/26/2017    LABIL2 1.6 06/26/2017    AST 14 06/26/2017    ALT 24 06/26/2017                 Assessment/Plan   1.  Diffuse large B-cell lymphoma status post CHOP Rituxan in 2011 with a complete remission. He follows up with Dr. Conner at [facility] and saw him last year. We will need to get the records from there and I have requested it.   Patient had a  PET/CT scan on 3/2/2017.  It showed infiltrative anterior mediastinal lymphadenopathy has low-level activity. There is otherwise no hypermetabolic lymphadenopathy.  2. Low level activity at the surgical bed at the posterior subcutaneous  soft tissues of the neck on the left.     2. New onset shortness of breath with night sweats, the scan shows 5.8 into 6.3-3.2 cm mass in the anterior mediastinum within the superior mediastinum just behind the sternum predominantly on the right of the midline which abuts the ascending aorta and aortic arch and causes of occlusion of the upper portion of the superior vena cava. Patient also has 2.1 cm right supraclavicular lymph node there is a pleural based 0.57 with a nodule in the right middle lobe.  Given pathology is negative we will need to refer patient to thoracic oncology to see if a mediastinoscopy with biopsy can be obtained in order to get the diagnosis.  If this turns out to be diffuse large B-cell lymphoma he would require salvage chemotherapy followed by autologous stem cell transplant.    Given that repeat mediastinoscopy is difficult in this situation, a decision was made to consider bone marrow aspiration and biopsy which is also negative.  Patient was referred to pulmonology.      3.  Patient with a history of SVC syndrome with prior failed SVC bypass.  He is now having nonsystemic neurologic symptoms.  He is feeling foggy headed and having some visual changes.  He had an MRI of the brain was negative.  He is currently scheduled for CT scans in July, however given his changes we will move those scans up to hopefully early next week.  We'll have the patient return for follow-up with Dr. Georges towards the end of next week to review the scans.  We will also start him on a course of prednisone 20 mg daily for one week, followed by 10 mg daily for one week.  I will also prescribed omeprazole 20 mg daily for the patient's reflux symptoms.  We will also refer him to  Dr. Bimal Rich with vascular surgery for evaluation.    Plan     1.  Prednisone to be changed to 10 mg every day ×3 days and then every other day for another 3 days and stop  2.   Refer patient to see Dr. Bimal Muhammad for SVC syndrome.  Discussed with patient that if he has chronic symptoms of headache fogginess and fullness significant enough that it's difficult for him to function, certainly we can await Dr. Bimal Rich's input, if nothing to offer locally certainly [facility] appointment can be made for him.  3.  Follow-up with me in 3 months with labs.     Ailyn Georges MD                    Cc:  Librado Valdes*

## 2017-08-18 ENCOUNTER — TELEPHONE (OUTPATIENT)
Dept: ONCOLOGY | Facility: HOSPITAL | Age: 32
End: 2017-08-18

## 2017-08-18 NOTE — TELEPHONE ENCOUNTER
Patient calling to see if Dr Georges will make a referral to neurology for patient. Pt states he has balance issues, neuropathy and vision problems that his MDs are aware of. The back of his eyes always feel tired.   Will send an inbasket message to Dr Georges to approve this.

## 2017-08-21 ENCOUNTER — TELEPHONE (OUTPATIENT)
Dept: ONCOLOGY | Facility: HOSPITAL | Age: 32
End: 2017-08-21

## 2017-08-21 DIAGNOSIS — R26.89 BALANCE PROBLEM: Primary | ICD-10-CM

## 2017-08-21 DIAGNOSIS — G62.9 NEUROPATHY: ICD-10-CM

## 2017-08-21 NOTE — TELEPHONE ENCOUNTER
Referral placed and message sent to scheduling.     ----- Message from Ailyn Georges MD sent at 8/18/2017  6:43 PM EDT -----  Yes it is okay to refer. Thanks.  Ailyn Georges MD  ----- Message -----     From: Teodora Howe RN     Sent: 8/18/2017   2:25 PM       To: Ailyn Georges MD    Patient calling to see if you will make a referral to neurology for him. Pt states he has balance issues, neuropathy and vision problems that aren't new but he still has them.THe back of his eyes always feel tired.   Ok to place referral? THanks

## 2017-09-11 ENCOUNTER — OFFICE VISIT (OUTPATIENT)
Dept: FAMILY MEDICINE CLINIC | Facility: CLINIC | Age: 32
End: 2017-09-11

## 2017-09-11 VITALS
BODY MASS INDEX: 32.07 KG/M2 | HEIGHT: 70 IN | WEIGHT: 224 LBS | HEART RATE: 89 BPM | DIASTOLIC BLOOD PRESSURE: 76 MMHG | OXYGEN SATURATION: 96 % | RESPIRATION RATE: 16 BRPM | SYSTOLIC BLOOD PRESSURE: 128 MMHG

## 2017-09-11 DIAGNOSIS — Z23 NEED FOR INFLUENZA VACCINATION: ICD-10-CM

## 2017-09-11 DIAGNOSIS — J30.2 SEASONAL ALLERGIC RHINITIS, UNSPECIFIED ALLERGIC RHINITIS TRIGGER: ICD-10-CM

## 2017-09-11 DIAGNOSIS — H53.8 BLURRY VISION, BILATERAL: ICD-10-CM

## 2017-09-11 DIAGNOSIS — R20.2 PARESTHESIAS: ICD-10-CM

## 2017-09-11 DIAGNOSIS — F41.1 GENERALIZED ANXIETY DISORDER: Primary | ICD-10-CM

## 2017-09-11 DIAGNOSIS — R26.89 BALANCE PROBLEMS: ICD-10-CM

## 2017-09-11 DIAGNOSIS — I87.1 SVC SYNDROME: ICD-10-CM

## 2017-09-11 PROCEDURE — 90471 IMMUNIZATION ADMIN: CPT | Performed by: FAMILY MEDICINE

## 2017-09-11 PROCEDURE — 90686 IIV4 VACC NO PRSV 0.5 ML IM: CPT | Performed by: FAMILY MEDICINE

## 2017-09-11 PROCEDURE — 99214 OFFICE O/P EST MOD 30 MIN: CPT | Performed by: FAMILY MEDICINE

## 2017-09-11 RX ORDER — NAPROXEN SODIUM 220 MG
220 TABLET ORAL 2 TIMES DAILY PRN
COMMUNITY
End: 2018-06-19

## 2017-09-11 RX ORDER — SERTRALINE HYDROCHLORIDE 100 MG/1
100 TABLET, FILM COATED ORAL DAILY
Qty: 30 TABLET | Refills: 3 | Status: SHIPPED | OUTPATIENT
Start: 2017-09-11 | End: 2017-09-21 | Stop reason: SINTOL

## 2017-09-11 NOTE — PATIENT INSTRUCTIONS
Try an over the counter steroid nasal spray like Flonase or Nasacort 1 spray per nostril daily      Generalized Anxiety Disorder  Generalized anxiety disorder (EBENEZER) is a mental disorder. It interferes with life functions, including relationships, work, and school.  EBENEZER is different from normal anxiety, which everyone experiences at some point in their lives in response to specific life events and activities. Normal anxiety actually helps us prepare for and get through these life events and activities. Normal anxiety goes away after the event or activity is over.   EBENEZER causes anxiety that is not necessarily related to specific events or activities. It also causes excess anxiety in proportion to specific events or activities. The anxiety associated with EBENEZER is also difficult to control. EBENEZER can vary from mild to severe. People with severe EBENEZER can have intense waves of anxiety with physical symptoms (panic attacks).   SYMPTOMS  The anxiety and worry associated with EBENEZER are difficult to control. This anxiety and worry are related to many life events and activities and also occur more days than not for 6 months or longer. People with EBENEZER also have three or more of the following symptoms (one or more in children):  · Restlessness.    · Fatigue.  · Difficulty concentrating.    · Irritability.  · Muscle tension.  · Difficulty sleeping or unsatisfying sleep.  DIAGNOSIS  EBENEZER is diagnosed through an assessment by your health care provider. Your health care provider will ask you questions about your mood, physical symptoms, and events in your life. Your health care provider may ask you about your medical history and use of alcohol or drugs, including prescription medicines. Your health care provider may also do a physical exam and blood tests. Certain medical conditions and the use of certain substances can cause symptoms similar to those associated with EBENEZER. Your health care provider may refer you to a mental health specialist  for further evaluation.  TREATMENT  The following therapies are usually used to treat EBENEZER:   · Medication. Antidepressant medication usually is prescribed for long-term daily control. Antianxiety medicines may be added in severe cases, especially when panic attacks occur.    · Talk therapy (psychotherapy). Certain types of talk therapy can be helpful in treating EBENEZER by providing support, education, and guidance. A form of talk therapy called cognitive behavioral therapy can teach you healthy ways to think about and react to daily life events and activities.  · Stress management techniques. These include yoga, meditation, and exercise and can be very helpful when they are practiced regularly.  A mental health specialist can help determine which treatment is best for you. Some people see improvement with one therapy. However, other people require a combination of therapies.     This information is not intended to replace advice given to you by your health care provider. Make sure you discuss any questions you have with your health care provider.     Document Released: 04/14/2014 Document Revised: 01/08/2016 Document Reviewed: 04/14/2014  [employer] Patient Education ©2017 [employer]

## 2017-09-11 NOTE — PROGRESS NOTES
"Subjective   Scott Allen is a 32 y.o. male. Being seen in office today to establish care. Stan would like to discuss anxiety issues he has had over x10 years and his options on medication. The ER set him up with a neurology appointment in December and wants to discuss the neurologic issues he is having today.    Chief Complaint   Patient presents with   • Establish Care     need to set up PCP   • Anxiety   • Neurologic Problem     fears that he may have MS       HPI     Anxiety: generalized anxiety disorder and social anxiety disorder.  Chronic problem dating back to childhood.  He took medication, maybe Wellbutrin for about 1 month as a young adult, but stopped it at the request of his grandmother (who herself had developed an addiction to pain medication).  He reports being prescribed \"a lot\" of Ativan to help him get through chemotherapy and the various biopsies and procedures related to his cancer care.  He would like to try a daily anti-anxiety medication.  NO SI/HI.      Hx of lymphoma s/p chemo and radiation 5-6 yr ago. He followed up with Heme/Onc earlier this year and had a bone marrow biopsy and lymph node biopsy which were normal.  He had a PET scan which showed low grade activity in the mediastium, at the site of his original cancer, but repeat scans were stable.  His Oncologist is Dr. Georges, next appt in 1 month.  Oncology note from Dr. Georges dated 6/30/17 reviewed.    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  He has suffered several TIAs associated with the SVC syndrome.  He has not been able to tolerate Warfarin or Aspirin in the past due to nose and mouth bleeds 2/2 increased pressure.  He has an appointment with [facility] scheduled for later this month (though he may push it back to November when he should be able to get insurance).           Neurologic symptoms: SOA since January.  Associated with slowed visual processing and sinus pressure. \" I feel almost hung over " "every day for the last few months.\"  MRI brain WNL 6/20/17.  Unusual hot and cold sensations in hands and feet. Balance is impaired, but he has not fallen.  He worries about possibly having MS or ALS.  Last eye exam was about 2 yr ago.  He has a Neurology appointment in December.    ER note from 6/20/17 reviewed: MRI brain WNL, ALT mildly elevated, BP 120s-140s/70s-80s          Review of Systems   Constitutional: Positive for fatigue. Negative for appetite change, fever and unexpected weight change.   HENT: Negative for congestion, rhinorrhea and sore throat.    Eyes: Positive for visual disturbance. Negative for photophobia, pain and redness.   Respiratory: Positive for shortness of breath. Negative for cough and chest tightness.    Cardiovascular: Negative for chest pain and palpitations.   Gastrointestinal: Positive for nausea. Negative for abdominal pain, constipation, diarrhea and vomiting.   Genitourinary: Negative for discharge, dysuria and hematuria.   Musculoskeletal: Positive for myalgias. Negative for arthralgias.   Skin: Negative for pallor and rash.   Allergic/Immunologic: Positive for environmental allergies. Negative for food allergies.   Neurological: Positive for dizziness, syncope and weakness. Negative for tremors, seizures, facial asymmetry, speech difficulty, numbness and headaches.   Psychiatric/Behavioral: Positive for sleep disturbance. Negative for dysphoric mood and suicidal ideas. The patient is nervous/anxious.         Stress and anxiety   All other systems reviewed and are negative.      The following portions of the patient's history were reviewed and updated as appropriate: allergies, current medications, past family history, past medical history, past social history, past surgical history and problem list.    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   • TIA (transient ischemic attack)      Past Surgical History:   Procedure Laterality Date   • BRACHIOCEPHALIC VEIN ANGIOPLASTY / STENTING Bilateral     no stents were able to be placed   • CHEST TUBE INSERTION Right     after thoracentesis   • EXCISION MASS HEAD/NECK Left 2/27/2017    Procedure: Excisional biopsy of left occipital lymph node;  Surgeon: Catalino Damon MD;  Location: [facility];  Service:    • RIB BIOPSY Right 2011    MEDIASTINUM   • SKIN BIOPSY      cyst on left shoulder, benign   • THORACENTESIS Right     thora drain left in for 6 months     Family History   Problem Relation Age of Onset   • Cancer Maternal Grandmother    • Cancer Maternal Grandfather    • Cancer Paternal Grandmother    • Diabetes Paternal Grandmother    • Cancer Paternal Grandfather      Social History     Social History   • Marital status: Single     Occupational History   • Physical therapy tech Other [employer]     Social History Main Topics   • Smoking status: Never Smoker   • Smokeless tobacco: Never Used   • Alcohol use Yes      Comment: occasional, 1-2 drinks per week   • Drug use: No   • Sexual activity: Yes     Partners: Male     Social History Narrative    Lives at home with his dog.  Works as a Physical Therapist.          No Known Allergies       Current Outpatient Prescriptions:   •  meclizine (ANTIVERT) 50 MG tablet, Take 0.5 tablets by mouth 3 (three) times a day as needed for dizziness for up to 20 doses., Disp: 20 tablet, Rfl: 0  •  naproxen sodium (ALEVE) 220 MG tablet, Take 220 mg by mouth 2 (Two) Times a Day As Needed., Disp: , Rfl:  Omeprazole 20 mg PO daily       Objective     Vitals:    09/11/17 1301   BP: 128/76   Pulse: 89   Resp: 16   SpO2: 96%       Physical Exam   Constitutional: He is oriented to person, place, and time. Vital signs are normal. He appears well-developed and well-nourished. No distress.   HENT:   Head: Normocephalic and atraumatic.    Nose: Nose normal.   Mouth/Throat: Oropharynx is clear and moist.   Eyes: Conjunctivae are normal. Pupils are equal, round, and reactive to light.   Neck: Normal range of motion. Neck supple. No thyromegaly present.   Cardiovascular: Normal rate, regular rhythm, S1 normal, S2 normal and normal heart sounds.  Exam reveals no gallop.    No murmur heard.  Pulses:       Radial pulses are 2+ on the right side, and 2+ on the left side.   Pulmonary/Chest: Effort normal and breath sounds normal. He has no wheezes. He has no rales. He exhibits no tenderness.   Abdominal: Soft. Bowel sounds are normal. He exhibits no distension. There is no hepatosplenomegaly. There is no tenderness. There is no rebound and no guarding.   Musculoskeletal: He exhibits no edema.   Lymphadenopathy:     He has no cervical adenopathy.   Neurological: He is alert and oriented to person, place, and time. He has normal strength. He displays no atrophy and no tremor. No cranial nerve deficit. He exhibits normal muscle tone. Coordination and gait normal.   Reflex Scores:       Bicep reflexes are 2+ on the right side and 2+ on the left side.       Brachioradialis reflexes are 2+ on the right side and 2+ on the left side.       Patellar reflexes are 2+ on the right side and 2+ on the left side.  Mild swaying during Romberg, but pt maintains standing posture with feet together   Skin: Skin is warm and dry. No cyanosis. Nails show no clubbing.   Psychiatric: His behavior is normal. His mood appears anxious. His speech is rapid and/or pressured.   Nursing note and vitals reviewed.      ASSESSMENT/PLAN       Problem List Items Addressed This Visit        Cardiovascular and Mediastinum    SVC syndrome    Relevant Orders      Follow up with Neurology and  [facility] specialists as scheduled       Other    Blurry vision, bilateral    Relevant Orders    Ambulatory Referral to Ophthalmology  Follow up with Neurology as scheduled      Generalized anxiety  disorder - Primary    Relevant Medications    sertraline (ZOLOFT) 100 MG tablet pt cautioned about risk for allergic reaction, serotonin syndrome, withdrawal if abruptly discontinued, and decreased libido  Pt declines referral for counseling at this time      Other Visit Diagnoses     Need for influenza vaccination        Relevant Orders    Flu Vaccine Intradermal Quad 18-64YR (FLUZONE [id number])      Parasthesias and balance concerns; pt reassured that neurological exam WNL today and brain MRI WNL 3 months ago.  Symptoms may be due to his known SVC syndrome and/or generalized anxiety disorder.  Pt encouraged to follow up with Neurology and SVC specialist at the [facility] as planned.  Pt advised to go to ER again if he develops sudden weakness.      Allergic rhinitis  Patient Instructions   Try an over the counter steroid nasal spray like Flonase or Nasacort 1 spray per nostril daily      Generalized Anxiety Disorder  Generalized anxiety disorder (EBENEZER) is a mental disorder. It interferes with life functions, including relationships, work, and school.  EBENEZER is different from normal anxiety, which everyone experiences at some point in their lives in response to specific life events and activities. Normal anxiety actually helps us prepare for and get through these life events and activities. Normal anxiety goes away after the event or activity is over.   EBENEZER causes anxiety that is not necessarily related to specific events or activities. It also causes excess anxiety in proportion to specific events or activities. The anxiety associated with EBENEZER is also difficult to control. EBENEZER can vary from mild to severe. People with severe EBENEZER can have intense waves of anxiety with physical symptoms (panic attacks).   SYMPTOMS  The anxiety and worry associated with EBENEZER are difficult to control. This anxiety and worry are related to many life events and activities and also occur more days than not for 6 months or longer. People  with EBENEZER also have three or more of the following symptoms (one or more in children):  · Restlessness.    · Fatigue.  · Difficulty concentrating.    · Irritability.  · Muscle tension.  · Difficulty sleeping or unsatisfying sleep.  DIAGNOSIS  EBENEZER is diagnosed through an assessment by your health care provider. Your health care provider will ask you questions about your mood, physical symptoms, and events in your life. Your health care provider may ask you about your medical history and use of alcohol or drugs, including prescription medicines. Your health care provider may also do a physical exam and blood tests. Certain medical conditions and the use of certain substances can cause symptoms similar to those associated with EBENEZER. Your health care provider may refer you to a mental health specialist for further evaluation.  TREATMENT  The following therapies are usually used to treat EBENEZER:   · Medication. Antidepressant medication usually is prescribed for long-term daily control. Antianxiety medicines may be added in severe cases, especially when panic attacks occur.    · Talk therapy (psychotherapy). Certain types of talk therapy can be helpful in treating EBENEZER by providing support, education, and guidance. A form of talk therapy called cognitive behavioral therapy can teach you healthy ways to think about and react to daily life events and activities.  · Stress management techniques. These include yoga, meditation, and exercise and can be very helpful when they are practiced regularly.  A mental health specialist can help determine which treatment is best for you. Some people see improvement with one therapy. However, other people require a combination of therapies.     This information is not intended to replace advice given to you by your health care provider. Make sure you discuss any questions you have with your health care provider.     Document Released: 04/14/2014 Document Revised: 01/08/2016 Document Reviewed:  04/14/2014  [employer] Patient Education ©2017 [employer]      Return in about 1 month (around 10/11/2017) for Annual exam with fasting labs.      Melina Kothari MD  09/11/17

## 2017-09-14 PROBLEM — J30.2 SEASONAL ALLERGIC RHINITIS: Status: ACTIVE | Noted: 2017-09-14

## 2017-09-20 ENCOUNTER — HOSPITAL ENCOUNTER (EMERGENCY)
Facility: HOSPITAL | Age: 32
Discharge: HOME | End: 2017-09-20
Attending: EMERGENCY MEDICINE | Admitting: EMERGENCY MEDICINE

## 2017-09-20 VITALS
SYSTOLIC BLOOD PRESSURE: 124 MMHG | HEIGHT: 71 IN | BODY MASS INDEX: 31.36 KG/M2 | OXYGEN SATURATION: 93 % | HEART RATE: 72 BPM | RESPIRATION RATE: 18 BRPM | WEIGHT: 224 LBS | DIASTOLIC BLOOD PRESSURE: 79 MMHG | TEMPERATURE: 98.5 F

## 2017-09-20 DIAGNOSIS — R26.89 BALANCE PROBLEM: Primary | ICD-10-CM

## 2017-09-20 DIAGNOSIS — R53.1 GENERAL WEAKNESS: ICD-10-CM

## 2017-09-20 LAB
ALBUMIN SERPL-MCNC: 4.7 G/DL (ref 3.5–5.2)
ALBUMIN/GLOB SERPL: 1.4 G/DL
ALP SERPL-CCNC: 54 U/L (ref 39–117)
ALT SERPL W P-5'-P-CCNC: 16 U/L (ref 1–41)
ANION GAP SERPL CALCULATED.3IONS-SCNC: 15.1 MMOL/L
AST SERPL-CCNC: 12 U/L (ref 1–40)
BASOPHILS # BLD AUTO: 0.03 10*3/MM3 (ref 0–0.2)
BASOPHILS NFR BLD AUTO: 0.6 % (ref 0–1.5)
BILIRUB SERPL-MCNC: 0.8 MG/DL (ref 0.1–1.2)
BUN BLD-MCNC: 20 MG/DL (ref 6–20)
BUN/CREAT SERPL: 18.3 (ref 7–25)
CALCIUM SPEC-SCNC: 9.6 MG/DL (ref 8.6–10.5)
CHLORIDE SERPL-SCNC: 102 MMOL/L (ref 98–107)
CO2 SERPL-SCNC: 23.9 MMOL/L (ref 22–29)
CREAT BLD-MCNC: 1.09 MG/DL (ref 0.76–1.27)
DEPRECATED RDW RBC AUTO: 39.9 FL (ref 37–54)
EOSINOPHIL # BLD AUTO: 0.05 10*3/MM3 (ref 0–0.7)
EOSINOPHIL NFR BLD AUTO: 0.9 % (ref 0.3–6.2)
ERYTHROCYTE [DISTWIDTH] IN BLOOD BY AUTOMATED COUNT: 12.8 % (ref 11.5–14.5)
GFR SERPL CREATININE-BSD FRML MDRD: 78 ML/MIN/1.73
GLOBULIN UR ELPH-MCNC: 3.3 GM/DL
GLUCOSE BLD-MCNC: 86 MG/DL (ref 65–99)
HCT VFR BLD AUTO: 46.5 % (ref 40.4–52.2)
HGB BLD-MCNC: 16.2 G/DL (ref 13.7–17.6)
IMM GRANULOCYTES # BLD: 0 10*3/MM3 (ref 0–0.03)
IMM GRANULOCYTES NFR BLD: 0 % (ref 0–0.5)
LYMPHOCYTES # BLD AUTO: 1.32 10*3/MM3 (ref 0.9–4.8)
LYMPHOCYTES NFR BLD AUTO: 24.7 % (ref 19.6–45.3)
MCH RBC QN AUTO: 29.9 PG (ref 27–32.7)
MCHC RBC AUTO-ENTMCNC: 34.8 G/DL (ref 32.6–36.4)
MCV RBC AUTO: 85.8 FL (ref 79.8–96.2)
MONOCYTES # BLD AUTO: 0.39 10*3/MM3 (ref 0.2–1.2)
MONOCYTES NFR BLD AUTO: 7.3 % (ref 5–12)
NEUTROPHILS # BLD AUTO: 3.55 10*3/MM3 (ref 1.9–8.1)
NEUTROPHILS NFR BLD AUTO: 66.5 % (ref 42.7–76)
PLATELET # BLD AUTO: 155 10*3/MM3 (ref 140–500)
PMV BLD AUTO: 9.5 FL (ref 6–12)
POTASSIUM BLD-SCNC: 4 MMOL/L (ref 3.5–5.2)
PROT SERPL-MCNC: 8 G/DL (ref 6–8.5)
RBC # BLD AUTO: 5.42 10*6/MM3 (ref 4.6–6)
SODIUM BLD-SCNC: 141 MMOL/L (ref 136–145)
WBC NRBC COR # BLD: 5.34 10*3/MM3 (ref 4.5–10.7)

## 2017-09-20 PROCEDURE — 80053 COMPREHEN METABOLIC PANEL: CPT | Performed by: NURSE PRACTITIONER

## 2017-09-20 PROCEDURE — 99284 EMERGENCY DEPT VISIT MOD MDM: CPT

## 2017-09-20 PROCEDURE — 85025 COMPLETE CBC W/AUTO DIFF WBC: CPT | Performed by: NURSE PRACTITIONER

## 2017-09-20 NOTE — ED NOTES
"Pt to ED for intermittent balance issues, weakness to bilateral arms, feeling \"foggy,\" and \"the tips of my fingers under my nails keeping getting cold\" x2 months. Pt reports hx of hodgkin's lymphoma and svc. Denies headache, n/v. Pt a&ox4     Niesha Poole RN  09/20/17 0622    "

## 2017-09-20 NOTE — ED PROVIDER NOTES
EMERGENCY DEPARTMENT ENCOUNTER    CHIEF COMPLAINT  Chief Complaint: balance problems  History given by: Pt  History limited by: Nothing  Room Number: 30/30  PMD: Melina Kothari MD      HPI:  Pt is a 32 y.o. male who presents complaining of balance problems, onset one week ago. He also c/o BUE weakness, SOA, decreased appetite. Pt states his weakness is exacerbated with movement or by sitting up. Pt denies leg weakness, vomiting and diarrhea. Pt states he has chronic balance issues when turning due to his SVC syndrome. He has been seen by Dr. Rich (vascular)  who stated he could not help the pt and recommends he follows up with the [facility]. Pt states he has an appointment this Friday with Dr. Georges and an appointment at the [facility] in November to look at his SVC issues. Pt states he was started Zoloft 3 days ago at the behest of his new primary care MD to help with his anxiety.      Duration:  One week  Onset: gradual  Timing: constant  Radiation: none  Quality: balance  Intensity/Severity: moderate  Progression: unchanged  Associated Symptoms: weakness in BUE, decreased appetite, SOA  Aggravating Factors: movement  Alleviating Factors: none  Previous Episodes: Pt reports prior sx of his SVC syndrome, but not of his current sx.  Treatment before arrival: Pt reports starting zoloft recently.    PAST MEDICAL HISTORY  Active Ambulatory Problems     Diagnosis Date Noted   • SVC syndrome 02/25/2017   • Diffuse large B cell lymphoma 02/26/2017   • Balance problem 08/21/2017   • Neuropathy 08/21/2017   • Blurry vision, bilateral 09/11/2017   • Generalized anxiety disorder 09/11/2017   • Seasonal allergic rhinitis 09/14/2017     Resolved Ambulatory Problems     Diagnosis Date Noted   • No Resolved Ambulatory Problems     Past Medical History:   Diagnosis Date   • Anxiety    • Anxiety    • GERD (gastroesophageal reflux disease)    • Lymphoma    • Superior vena cava syndrome    • TIA (transient  ischemic attack)        PAST SURGICAL HISTORY  Past Surgical History:   Procedure Laterality Date   • BRACHIOCEPHALIC VEIN ANGIOPLASTY / STENTING Bilateral     no stents were able to be placed   • CHEST TUBE INSERTION Right     after thoracentesis   • EXCISION MASS HEAD/NECK Left 2/27/2017    Procedure: Excisional biopsy of left occipital lymph node;  Surgeon: Catalino Damon MD;  Location: [facility];  Service:    • RIB BIOPSY Right 2011    MEDIASTINUM   • SKIN BIOPSY      cyst on left shoulder, benign   • THORACENTESIS Right     thora drain left in for 6 months       FAMILY HISTORY  Family History   Problem Relation Age of Onset   • Cancer Maternal Grandmother    • Cancer Maternal Grandfather    • Cancer Paternal Grandmother    • Diabetes Paternal Grandmother    • Cancer Paternal Grandfather        SOCIAL HISTORY  Social History     Social History   • Marital status: Single     Spouse name: N/A   • Number of children: N/A   • Years of education: College     Occupational History   • 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  Works as a Physical Therapist.         ALLERGIES  Review of patient's allergies indicates no known allergies.    REVIEW OF SYSTEMS  Review of Systems   Constitutional: Positive for appetite change (decreased). Negative for activity change and fever.   HENT: Negative for congestion and sore throat.    Eyes: Negative.    Respiratory: Positive for shortness of breath (mild). Negative for cough.    Cardiovascular: Negative for chest pain and leg swelling.   Gastrointestinal: Negative for abdominal pain, diarrhea and vomiting.   Endocrine: Negative.     Genitourinary: Negative for decreased urine volume and dysuria.   Musculoskeletal: Negative for neck pain.   Skin: Negative for rash and wound.   Allergic/Immunologic: Negative.    Neurological: Positive for weakness (BUE). Negative for numbness and headaches.        Pt reports balance problems   Hematological: Negative.    Psychiatric/Behavioral: Negative.    All other systems reviewed and are negative.      PHYSICAL EXAM  ED Triage Vitals   Temp Heart Rate Resp BP SpO2   09/20/17 1643 09/20/17 1643 09/20/17 1643 09/20/17 1651 09/20/17 1643   98 °F (36.7 °C) 106 18 123/77 96 %      Temp src Heart Rate Source Patient Position BP Location FiO2 (%)   -- -- -- -- --              Physical Exam   Constitutional: He is oriented to person, place, and time and well-developed, well-nourished, and in no distress.   HENT:   Head: Normocephalic and atraumatic.   Eyes: EOM are normal. Pupils are equal, round, and reactive to light.   Neck: Normal range of motion. Neck supple.   Cardiovascular: Normal rate, regular rhythm and normal heart sounds.    Pulmonary/Chest: Effort normal and breath sounds normal. No respiratory distress.   Abdominal: Soft. There is no tenderness. There is no rebound and no guarding.   Musculoskeletal: Normal range of motion. He exhibits no edema.   RUE: mild fullness of collateral veins   Neurological: He is alert and oriented to person, place, and time. He has normal sensation and normal strength.   Skin: Skin is warm and dry.   Psychiatric: Mood and affect normal.   Nursing note and vitals reviewed.      LAB RESULTS  Lab Results (last 24 hours)     Procedure Component Value Units Date/Time    CBC & Differential [[id number]] Collected:  09/20/17 1734    Specimen:  Blood Updated:  09/20/17 1745    Narrative:       The following orders were created for panel order CBC & Differential.  Procedure                               Abnormality         Status                     ---------                                -----------         ------                     CBC Auto Differential[[id number]]        Normal              Final result                 Please view results for these tests on the individual orders.    Comprehensive Metabolic Panel [[id number]] Collected:  09/20/17 1734    Specimen:  Blood Updated:  09/20/17 1805     Glucose 86 mg/dL      BUN 20 mg/dL      Creatinine 1.09 mg/dL      Sodium 141 mmol/L      Potassium 4.0 mmol/L      Chloride 102 mmol/L      CO2 23.9 mmol/L      Calcium 9.6 mg/dL      Total Protein 8.0 g/dL      Albumin 4.70 g/dL      ALT (SGPT) 16 U/L      AST (SGOT) 12 U/L      Alkaline Phosphatase 54 U/L      Total Bilirubin 0.8 mg/dL      eGFR Non African Amer 78 mL/min/1.73      Globulin 3.3 gm/dL      A/G Ratio 1.4 g/dL      BUN/Creatinine Ratio 18.3     Anion Gap 15.1 mmol/L     CBC Auto Differential [[id number]]  (Normal) Collected:  09/20/17 1734    Specimen:  Blood Updated:  09/20/17 1745     WBC 5.34 10*3/mm3      RBC 5.42 10*6/mm3      Hemoglobin 16.2 g/dL      Hematocrit 46.5 %      MCV 85.8 fL      MCH 29.9 pg      MCHC 34.8 g/dL      RDW 12.8 %      RDW-SD 39.9 fl      MPV 9.5 fL      Platelets 155 10*3/mm3      Neutrophil % 66.5 %      Lymphocyte % 24.7 %      Monocyte % 7.3 %      Eosinophil % 0.9 %      Basophil % 0.6 %      Immature Grans % 0.0 %      Neutrophils, Absolute 3.55 10*3/mm3      Lymphocytes, Absolute 1.32 10*3/mm3      Monocytes, Absolute 0.39 10*3/mm3      Eosinophils, Absolute 0.05 10*3/mm3      Basophils, Absolute 0.03 10*3/mm3      Immature Grans, Absolute 0.00 10*3/mm3           I ordered the above labs and reviewed the results    PROCEDURES  Procedures      PROGRESS AND CONSULTS  ED Course     1812 - Rechecked pt. Pt is resting comfortably. Informed the pt of his unremarkable metabolic panel. D/w pt the plan to discharge home. Pt understands and agrees with plan. All questions answered.     MEDICAL DECISION MAKING  Results were reviewed/discussed with the patient  and they were also made aware of online access. Pt also made aware that some labs, such as cultures, will not be resulted during ER visit and follow up with PMD is necessary.     MDM  Number of Diagnoses or Management Options     Amount and/or Complexity of Data Reviewed  Clinical lab tests: reviewed and ordered (Unremarkable labs)           DIAGNOSIS  Final diagnoses:   Balance problem   General weakness       DISPOSITION  DISCHARGE    Patient discharged in stable condition.    Reviewed implications of results, diagnosis, meds, responsibility to follow up, warning signs and symptoms of possible worsening, potential complications and reasons to return to ER.    Patient/Family voiced understanding of above instructions.    Discussed plan for discharge, as there is no emergent indication for admission.  Pt/family is agreeable and understands need for follow up and repeat testing.  Pt is aware that discharge does not mean that nothing is wrong but it indicates no emergency is present that requires admission and they must continue care with follow-up as given below or physician of their choice.     FOLLOW-UP  Melina Kothari MD  [address] [facility] [id number] [phone]          Ailyn Georges MD  [address] [id number] [phone]               Medication List      Notice     No changes were made to your prescriptions during this visit.            Latest Documented Vital Signs:  As of 6:20 PM  BP- 128/77 HR- 73 Temp- 98.6 °F (37 °C) (Tympanic) O2 sat- 93%    --  Documentation assistance provided by kirsty Sierra for Dr. Santiago.  Information recorded by the scribe was done at my direction and has been verified and validated by me.     Bj Sierra  09/20/17 1829       Bj Sierra  09/20/17 1829       Robinson Santiago MD  09/20/17 2011

## 2017-09-20 NOTE — ED TRIAGE NOTES
Patient reports shortness of breath that has been going on since January. He states that he has been in and out of hospitals and feels foggy headed. He states that also under his fingers and toe nails have been cold. He also states he has balance issues that come and go.

## 2017-09-21 ENCOUNTER — TELEPHONE (OUTPATIENT)
Dept: FAMILY MEDICINE CLINIC | Facility: CLINIC | Age: 32
End: 2017-09-21

## 2017-09-21 DIAGNOSIS — F41.1 GENERALIZED ANXIETY DISORDER: Primary | ICD-10-CM

## 2017-09-21 RX ORDER — VENLAFAXINE HYDROCHLORIDE 75 MG/1
75 CAPSULE, EXTENDED RELEASE ORAL DAILY
Qty: 30 CAPSULE | Refills: 1 | Status: SHIPPED | OUTPATIENT
Start: 2017-09-21 | End: 2017-10-12

## 2017-09-21 NOTE — TELEPHONE ENCOUNTER
Pt called clinic and left a voicemail stating that he was experiencing muscle aches and increased gag reflex since starting sertraline.    When I returned pt's call there was no answer, so I left a voicemail advising him to discontinue the new medication (sertraline) and advising him that I would call in a prescription for an alternative medication to his pharmacy which he could  tomorrow.  I also advised him to call back during business hours with any questions about the new medication.  Rx for venlafaxine sent to his pharmacy electronically.

## 2017-09-23 ENCOUNTER — APPOINTMENT (OUTPATIENT)
Dept: GENERAL RADIOLOGY | Facility: HOSPITAL | Age: 32
End: 2017-09-23

## 2017-09-23 ENCOUNTER — HOSPITAL ENCOUNTER (EMERGENCY)
Facility: HOSPITAL | Age: 32
Discharge: HOME | End: 2017-09-23
Attending: EMERGENCY MEDICINE | Admitting: EMERGENCY MEDICINE

## 2017-09-23 VITALS
WEIGHT: 224 LBS | HEIGHT: 71 IN | TEMPERATURE: 98.4 F | BODY MASS INDEX: 31.36 KG/M2 | DIASTOLIC BLOOD PRESSURE: 78 MMHG | SYSTOLIC BLOOD PRESSURE: 123 MMHG | RESPIRATION RATE: 16 BRPM | HEART RATE: 66 BPM | OXYGEN SATURATION: 97 %

## 2017-09-23 DIAGNOSIS — R42 DIZZINESS: ICD-10-CM

## 2017-09-23 DIAGNOSIS — R61 UNEXPLAINED NIGHT SWEATS: Primary | ICD-10-CM

## 2017-09-23 LAB
ALBUMIN SERPL-MCNC: 4.3 G/DL (ref 3.5–5.2)
ALBUMIN/GLOB SERPL: 1.4 G/DL
ALP SERPL-CCNC: 48 U/L (ref 39–117)
ALT SERPL W P-5'-P-CCNC: 13 U/L (ref 1–41)
ANION GAP SERPL CALCULATED.3IONS-SCNC: 16.6 MMOL/L
AST SERPL-CCNC: 8 U/L (ref 1–40)
B PERT DNA SPEC QL NAA+PROBE: NOT DETECTED
BASOPHILS # BLD AUTO: 0.03 10*3/MM3 (ref 0–0.2)
BASOPHILS NFR BLD AUTO: 0.7 % (ref 0–1.5)
BILIRUB SERPL-MCNC: 0.9 MG/DL (ref 0.1–1.2)
BILIRUB UR QL STRIP: NEGATIVE
BUN BLD-MCNC: 18 MG/DL (ref 6–20)
BUN/CREAT SERPL: 17.1 (ref 7–25)
C PNEUM DNA NPH QL NAA+NON-PROBE: NOT DETECTED
CALCIUM SPEC-SCNC: 9.6 MG/DL (ref 8.6–10.5)
CHLORIDE SERPL-SCNC: 100 MMOL/L (ref 98–107)
CLARITY UR: CLEAR
CO2 SERPL-SCNC: 21.4 MMOL/L (ref 22–29)
COLOR UR: ABNORMAL
CREAT BLD-MCNC: 1.05 MG/DL (ref 0.76–1.27)
DEPRECATED RDW RBC AUTO: 39.6 FL (ref 37–54)
EOSINOPHIL # BLD AUTO: 0.09 10*3/MM3 (ref 0–0.7)
EOSINOPHIL NFR BLD AUTO: 2.2 % (ref 0.3–6.2)
ERYTHROCYTE [DISTWIDTH] IN BLOOD BY AUTOMATED COUNT: 12.8 % (ref 11.5–14.5)
FLUAV H1 2009 PAND RNA NPH QL NAA+PROBE: NOT DETECTED
FLUAV H1 HA GENE NPH QL NAA+PROBE: NOT DETECTED
FLUAV H3 RNA NPH QL NAA+PROBE: NOT DETECTED
FLUAV SUBTYP SPEC NAA+PROBE: NOT DETECTED
FLUBV RNA ISLT QL NAA+PROBE: NOT DETECTED
GFR SERPL CREATININE-BSD FRML MDRD: 82 ML/MIN/1.73
GLOBULIN UR ELPH-MCNC: 3.1 GM/DL
GLUCOSE BLD-MCNC: 102 MG/DL (ref 65–99)
GLUCOSE UR STRIP-MCNC: NEGATIVE MG/DL
HADV DNA SPEC NAA+PROBE: NOT DETECTED
HCOV 229E RNA SPEC QL NAA+PROBE: NOT DETECTED
HCOV HKU1 RNA SPEC QL NAA+PROBE: NOT DETECTED
HCOV NL63 RNA SPEC QL NAA+PROBE: NOT DETECTED
HCOV OC43 RNA SPEC QL NAA+PROBE: NOT DETECTED
HCT VFR BLD AUTO: 44.4 % (ref 40.4–52.2)
HGB BLD-MCNC: 15.5 G/DL (ref 13.7–17.6)
HGB UR QL STRIP.AUTO: NEGATIVE
HMPV RNA NPH QL NAA+NON-PROBE: NOT DETECTED
HPIV1 RNA SPEC QL NAA+PROBE: NOT DETECTED
HPIV2 RNA SPEC QL NAA+PROBE: NOT DETECTED
HPIV3 RNA NPH QL NAA+PROBE: NOT DETECTED
HPIV4 P GENE NPH QL NAA+PROBE: NOT DETECTED
IMM GRANULOCYTES # BLD: 0 10*3/MM3 (ref 0–0.03)
IMM GRANULOCYTES NFR BLD: 0 % (ref 0–0.5)
INR PPP: 1.16 (ref 0.9–1.1)
KETONES UR QL STRIP: NEGATIVE
LEUKOCYTE ESTERASE UR QL STRIP.AUTO: NEGATIVE
LYMPHOCYTES # BLD AUTO: 1.04 10*3/MM3 (ref 0.9–4.8)
LYMPHOCYTES NFR BLD AUTO: 25.2 % (ref 19.6–45.3)
M PNEUMO IGG SER IA-ACNC: NOT DETECTED
MCH RBC QN AUTO: 29.7 PG (ref 27–32.7)
MCHC RBC AUTO-ENTMCNC: 34.9 G/DL (ref 32.6–36.4)
MCV RBC AUTO: 85.1 FL (ref 79.8–96.2)
MONOCYTES # BLD AUTO: 0.25 10*3/MM3 (ref 0.2–1.2)
MONOCYTES NFR BLD AUTO: 6.1 % (ref 5–12)
NEUTROPHILS # BLD AUTO: 2.71 10*3/MM3 (ref 1.9–8.1)
NEUTROPHILS NFR BLD AUTO: 65.8 % (ref 42.7–76)
NITRITE UR QL STRIP: NEGATIVE
PH UR STRIP.AUTO: 5.5 [PH] (ref 5–8)
PLATELET # BLD AUTO: 144 10*3/MM3 (ref 140–500)
PMV BLD AUTO: 9.9 FL (ref 6–12)
POTASSIUM BLD-SCNC: 3.8 MMOL/L (ref 3.5–5.2)
PROT SERPL-MCNC: 7.4 G/DL (ref 6–8.5)
PROT UR QL STRIP: NEGATIVE
PROTHROMBIN TIME: 14.4 SECONDS (ref 11.7–14.2)
RBC # BLD AUTO: 5.22 10*6/MM3 (ref 4.6–6)
RHINOVIRUS RNA SPEC NAA+PROBE: NOT DETECTED
RSV RNA NPH QL NAA+NON-PROBE: NOT DETECTED
SODIUM BLD-SCNC: 138 MMOL/L (ref 136–145)
SP GR UR STRIP: 1.03 (ref 1–1.03)
UROBILINOGEN UR QL STRIP: ABNORMAL
WBC NRBC COR # BLD: 4.12 10*3/MM3 (ref 4.5–10.7)

## 2017-09-23 PROCEDURE — 99284 EMERGENCY DEPT VISIT MOD MDM: CPT

## 2017-09-23 PROCEDURE — 80053 COMPREHEN METABOLIC PANEL: CPT | Performed by: EMERGENCY MEDICINE

## 2017-09-23 PROCEDURE — 87633 RESP VIRUS 12-25 TARGETS: CPT | Performed by: EMERGENCY MEDICINE

## 2017-09-23 PROCEDURE — 81003 URINALYSIS AUTO W/O SCOPE: CPT | Performed by: EMERGENCY MEDICINE

## 2017-09-23 PROCEDURE — 71020 HC CHEST PA AND LATERAL: CPT

## 2017-09-23 PROCEDURE — 87581 M.PNEUMON DNA AMP PROBE: CPT | Performed by: EMERGENCY MEDICINE

## 2017-09-23 PROCEDURE — 87798 DETECT AGENT NOS DNA AMP: CPT | Performed by: EMERGENCY MEDICINE

## 2017-09-23 PROCEDURE — 85025 COMPLETE CBC W/AUTO DIFF WBC: CPT | Performed by: EMERGENCY MEDICINE

## 2017-09-23 PROCEDURE — 85610 PROTHROMBIN TIME: CPT | Performed by: EMERGENCY MEDICINE

## 2017-09-23 PROCEDURE — 87486 CHLMYD PNEUM DNA AMP PROBE: CPT | Performed by: EMERGENCY MEDICINE

## 2017-09-23 RX ORDER — SODIUM CHLORIDE 0.9 % (FLUSH) 0.9 %
10 SYRINGE (ML) INJECTION AS NEEDED
Status: DISCONTINUED | OUTPATIENT
Start: 2017-09-23 | End: 2017-09-23 | Stop reason: HOSPADM

## 2017-09-23 NOTE — ED PROVIDER NOTES
" EMERGENCY DEPARTMENT ENCOUNTER    CHIEF COMPLAINT  Chief Complaint: night sweats  History given by: patient  History limited by: none  Room Number: 19/19  PMD: Melina Kothari MD      HPI:  Pt is a 32 y.o. male who presents complaining of night sweats, dizziness, and malaise that have been going on for approximately 3-4 days. Pt was seen in ED on 09/20/17 for related sx. Pt recently taken off of Zoloft but night sweats have started before then. Pt has hx of B cell lymphoma and SVC occlusion. Pt reports having breathing issues since January and upper body swelling for the last 6 years. Pt also has \"concentrated pressure behind the eyes\". Pt reports sinuses feel tight but no drainage.        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   • Diffuse large B cell lymphoma 02/26/2017   • Balance problem 08/21/2017   • Neuropathy 08/21/2017   • Blurry vision, bilateral 09/11/2017   • Generalized anxiety disorder 09/11/2017   • Seasonal allergic rhinitis 09/14/2017     Resolved Ambulatory Problems     Diagnosis Date Noted   • No Resolved Ambulatory Problems     Past Medical History:   Diagnosis Date   • Anxiety    • Anxiety    • GERD (gastroesophageal reflux disease)    • Lymphoma    • Superior vena cava syndrome    • TIA (transient ischemic attack)        PAST SURGICAL HISTORY  Past Surgical History:   Procedure Laterality Date   • BRACHIOCEPHALIC VEIN ANGIOPLASTY / STENTING Bilateral     no stents were able to be placed   • CHEST TUBE INSERTION Right     after thoracentesis   • EXCISION MASS HEAD/NECK Left 2/27/2017    Procedure: Excisional biopsy " of left occipital lymph node;  Surgeon: Catalino Damon MD;  Location: [facility] OR;  Service:    • RIB BIOPSY Right 2011    MEDIASTINUM   • SKIN BIOPSY      cyst on left shoulder, benign   • THORACENTESIS Right     thora drain left in for 6 months       FAMILY HISTORY  Family History   Problem Relation Age of Onset   • Cancer Maternal Grandmother    • Cancer Maternal Grandfather    • Cancer Paternal Grandmother    • Diabetes Paternal Grandmother    • Cancer Paternal Grandfather        SOCIAL HISTORY  Social History     Social History   • Marital status: Single     Spouse name: N/A   • Number of children: N/A   • Years of education: College     Occupational History   • Physical therapy tech Other [employer]     Social History Main Topics   • Smoking status: Never Smoker   • Smokeless tobacco: Never Used   • Alcohol use Yes      Comment: occasional, 1-2 drinks per week   • Drug use: No   • Sexual activity: Yes     Partners: Male     Other Topics Concern   • Not on file     Social History Narrative    Lives at home with his dog.  Works as a Physical Therapist.         ALLERGIES  Review of patient's allergies indicates no known allergies.    REVIEW OF SYSTEMS  Review of Systems   Constitutional: Positive for diaphoresis (at night). Negative for activity change, appetite change and fever.   HENT: Negative for congestion, ear pain and sore throat.    Eyes: Negative.    Respiratory: Negative for cough and shortness of breath.    Cardiovascular: Negative for chest pain and leg swelling.   Gastrointestinal: Negative for abdominal pain, diarrhea and vomiting.   Endocrine: Negative.    Genitourinary: Negative for decreased urine volume and dysuria.   Musculoskeletal: Negative for neck pain.   Skin: Negative for rash and wound.   Allergic/Immunologic: Negative.    Neurological: Negative for weakness, numbness and headaches.   Hematological: Negative.    Psychiatric/Behavioral: Negative.    All other systems  reviewed and are negative.      PHYSICAL EXAM  ED Triage Vitals   Temp Heart Rate Resp BP SpO2   09/23/17 0441 09/23/17 0441 09/23/17 0441 09/23/17 0445 09/23/17 0441   96.4 °F (35.8 °C) 99 16 125/83 93 %      Temp src Heart Rate Source Patient Position BP Location FiO2 (%)   09/23/17 0441 09/23/17 0441 -- -- --   Tympanic Monitor          Physical Exam   Constitutional: He is oriented to person, place, and time and well-developed, well-nourished, and in no distress.   HENT:   Head: Normocephalic and atraumatic.   Mouth/Throat: Oropharynx is clear and moist.   Eyes: EOM are normal. Pupils are equal, round, and reactive to light.   Neck: Normal range of motion. Neck supple.   Cardiovascular: Normal rate, regular rhythm and normal heart sounds.    Pulmonary/Chest: Effort normal and breath sounds normal. No respiratory distress.   Abdominal: Soft. Bowel sounds are normal. There is no tenderness. There is no rebound and no guarding.   Musculoskeletal: Normal range of motion. He exhibits no edema.   Neurological: He is alert and oriented to person, place, and time. He has normal sensation and normal strength.   Skin: Skin is warm and dry.   Psychiatric: Mood and affect normal.   Nursing note and vitals reviewed.      LAB RESULTS  Lab Results (last 24 hours)     Procedure Component Value Units Date/Time    CBC & Differential [[id number]] Collected:  09/23/17 0523    Specimen:  Blood Updated:  09/23/17 0537    Narrative:       The following orders were created for panel order CBC & Differential.  Procedure                               Abnormality         Status                     ---------                               -----------         ------                     CBC Auto Differential[[id number]]        Abnormal            Final result                 Please view results for these tests on the individual orders.    Comprehensive Metabolic Panel [[id number]]  (Abnormal) Collected:  09/23/17 0523    Specimen:  Blood  Updated:  09/23/17 0552     Glucose 102 (H) mg/dL      BUN 18 mg/dL      Creatinine 1.05 mg/dL      Sodium 138 mmol/L      Potassium 3.8 mmol/L      Chloride 100 mmol/L      CO2 21.4 (L) mmol/L      Calcium 9.6 mg/dL      Total Protein 7.4 g/dL      Albumin 4.30 g/dL      ALT (SGPT) 13 U/L      AST (SGOT) 8 U/L      Alkaline Phosphatase 48 U/L      Total Bilirubin 0.9 mg/dL      eGFR Non African Amer 82 mL/min/1.73      Globulin 3.1 gm/dL      A/G Ratio 1.4 g/dL      BUN/Creatinine Ratio 17.1     Anion Gap 16.6 mmol/L     Protime-INR [[id number]]  (Abnormal) Collected:  09/23/17 0523    Specimen:  Blood Updated:  09/23/17 0547     Protime 14.4 (H) Seconds      INR 1.16 (H)    CBC Auto Differential [[id number]]  (Abnormal) Collected:  09/23/17 0523    Specimen:  Blood Updated:  09/23/17 0537     WBC 4.12 (L) 10*3/mm3      RBC 5.22 10*6/mm3      Hemoglobin 15.5 g/dL      Hematocrit 44.4 %      MCV 85.1 fL      MCH 29.7 pg      MCHC 34.9 g/dL      RDW 12.8 %      RDW-SD 39.6 fl      MPV 9.9 fL      Platelets 144 10*3/mm3      Neutrophil % 65.8 %      Lymphocyte % 25.2 %      Monocyte % 6.1 %      Eosinophil % 2.2 %      Basophil % 0.7 %      Immature Grans % 0.0 %      Neutrophils, Absolute 2.71 10*3/mm3      Lymphocytes, Absolute 1.04 10*3/mm3      Monocytes, Absolute 0.25 10*3/mm3      Eosinophils, Absolute 0.09 10*3/mm3      Basophils, Absolute 0.03 10*3/mm3      Immature Grans, Absolute 0.00 10*3/mm3     Respiratory Panel, PCR [[id number]]  (Normal) Collected:  09/23/17 0524    Specimen:  Swab from Nasopharynx Updated:  09/23/17 0656     ADENOVIRUS, PCR Not Detected     Coronavirus 229E Not Detected     Coronavirus HKU1 Not Detected     Coronavirus NL63 Not Detected     Coronavirus OC43 Not Detected     Human Metapneumovirus Not Detected     Human Rhinovirus/Enterovirus Not Detected     Influenza B PCR Not Detected     Parainfluenza Virus 1 Not Detected     Parainfluenza Virus 2 Not Detected     Parainfluenza Virus 3  Not Detected     Parainfluenza Virus 4 Not Detected     Bordetella pertussis pcr Not Detected     Influenza 2009 H1N1 by PCR Not Detected     Chlamydophila pneumoniae PCR Not Detected     Mycoplasma pneumo by PCR Not Detected     Influenza A PCR Not Detected     Influenza A H3 Not Detected     Influenza A H1 Not Detected     RSV, PCR Not Detected    Urinalysis With / Culture If Indicated [[id number]]  (Abnormal) Collected:  09/23/17 0549    Specimen:  Urine from Urine, Clean Catch Updated:  09/23/17 0559     Color, UA Dark Yellow (A)     Appearance, UA Clear     pH, UA 5.5     Specific Gravity, UA 1.028     Glucose, UA Negative     Ketones, UA Negative     Bilirubin, UA Negative     Blood, UA Negative     Protein, UA Negative     Leuk Esterase, UA Negative     Nitrite, UA Negative     Urobilinogen, UA 0.2 E.U./dL    Narrative:       Urine microscopic not indicated.          I ordered the above labs and reviewed the results    RADIOLOGY  XR Chest 2 View   Final Result   1. No active disease.       This report was finalized on 9/23/2017 5:38 AM by Bimal Cabrera MD.               I ordered the above noted radiological studies. Interpreted by radiologist. Reviewed by me in PACS.       PROCEDURES  Procedures      PROGRESS AND CONSULTS  ED Course   0512  CBC, UA, Protime-INR, CMP, Respiratory Panel, CXR ordered    0720  Discussed patient with Dr. Tejada, will notify Dr. Bhupallam, to follow patient in office next week          MEDICAL DECISION MAKING  Results were reviewed/discussed with the patient and they were also made aware of online access. Pt also made aware that some labs, such as cultures, will not be resulted during ER visit and follow up with PMD is necessary.     MDM  Number of Diagnoses or Management Options     Amount and/or Complexity of Data Reviewed  Clinical lab tests: reviewed and ordered (Labs are all unremarkable)  Tests in the radiology section of CPT®: reviewed and ordered (CXR-no active  disease)  Discuss the patient with other providers: yes (Dr. Tejada, [facility])  Independent visualization of images, tracings, or specimens: yes           DIAGNOSIS  Final diagnoses:   Unexplained night sweats   Dizziness       DISPOSITION  DISCHARGE    Patient discharged in stable condition.    Reviewed implications of results, diagnosis, meds, responsibility to follow up, warning signs and symptoms of possible worsening, potential complications and reasons to return to ER.    Patient/Family voiced understanding of above instructions.    Discussed plan for discharge, as there is no emergent indication for admission.  Pt/family is agreeable and understands need for follow up and repeat testing.  Pt is aware that discharge does not mean that nothing is wrong but it indicates no emergency is present that requires admission and they must continue care with follow-up as given below or physician of their choice.     FOLLOW-UP  No follow-up provider specified.       Medication List      Notice     No changes were made to your prescriptions during this visit.            Latest Documented Vital Signs:  As of 7:26 AM  BP- 123/79 HR- 61 Temp- 98.2 °F (36.8 °C) (Oral) O2 sat- 97%    --  Documentation assistance provided by kirsty Pa for Dr. Khalil.  Information recorded by the scribe was done at my direction and has been verified and validated by me.          Xochilt Pa  09/23/17 0700       Octaviano Khalil MD  09/23/17 [id number]

## 2017-09-23 NOTE — ED TRIAGE NOTES
"Dizziness, night sweats x 3, \"rash feeling\"  Flushed all over.  States rt arm feels week.  Seen here Wednesday for similar symptoms, but symptoms have worsened.  Pt concerned because he is being watched for recurrence of his Diffuse large B cell lymphoma.  "

## 2017-09-29 ENCOUNTER — DOCUMENTATION (OUTPATIENT)
Dept: ONCOLOGY | Facility: CLINIC | Age: 32
End: 2017-09-29

## 2017-09-29 ENCOUNTER — OFFICE VISIT (OUTPATIENT)
Dept: ONCOLOGY | Facility: CLINIC | Age: 32
End: 2017-09-29

## 2017-09-29 ENCOUNTER — LAB (OUTPATIENT)
Dept: LAB | Facility: HOSPITAL | Age: 32
End: 2017-09-29

## 2017-09-29 ENCOUNTER — TELEPHONE (OUTPATIENT)
Dept: ONCOLOGY | Facility: CLINIC | Age: 32
End: 2017-09-29

## 2017-09-29 VITALS
SYSTOLIC BLOOD PRESSURE: 138 MMHG | HEIGHT: 71 IN | RESPIRATION RATE: 12 BRPM | WEIGHT: 211.4 LBS | OXYGEN SATURATION: 98 % | TEMPERATURE: 98.4 F | DIASTOLIC BLOOD PRESSURE: 88 MMHG | BODY MASS INDEX: 29.6 KG/M2 | HEART RATE: 80 BPM

## 2017-09-29 DIAGNOSIS — C83.33 DIFFUSE LARGE B-CELL LYMPHOMA OF INTRA-ABDOMINAL LYMPH NODES (HCC): ICD-10-CM

## 2017-09-29 DIAGNOSIS — F41.1 GENERALIZED ANXIETY DISORDER: ICD-10-CM

## 2017-09-29 DIAGNOSIS — I87.1 SVC SYNDROME: ICD-10-CM

## 2017-09-29 DIAGNOSIS — R53.83 OTHER FATIGUE: Primary | ICD-10-CM

## 2017-09-29 DIAGNOSIS — C83.30 DIFFUSE LARGE B-CELL LYMPHOMA, UNSPECIFIED BODY REGION (HCC): ICD-10-CM

## 2017-09-29 DIAGNOSIS — G62.9 NEUROPATHY: ICD-10-CM

## 2017-09-29 DIAGNOSIS — C83.33 DIFFUSE LARGE B-CELL LYMPHOMA OF INTRA-ABDOMINAL LYMPH NODES (HCC): Primary | ICD-10-CM

## 2017-09-29 DIAGNOSIS — R53.83 OTHER FATIGUE: ICD-10-CM

## 2017-09-29 LAB
ALBUMIN SERPL-MCNC: 4.7 G/DL (ref 3.5–5.2)
ALBUMIN/GLOB SERPL: 1.6 G/DL (ref 1.1–2.4)
ALP SERPL-CCNC: 54 U/L (ref 38–116)
ALT SERPL W P-5'-P-CCNC: 14 U/L (ref 0–41)
ANION GAP SERPL CALCULATED.3IONS-SCNC: 10.4 MMOL/L
AST SERPL-CCNC: 14 U/L (ref 0–40)
BASOPHILS # BLD AUTO: 0.04 10*3/MM3 (ref 0–0.1)
BASOPHILS NFR BLD AUTO: 1 % (ref 0–1.1)
BILIRUB SERPL-MCNC: 0.7 MG/DL (ref 0.1–1.2)
BUN BLD-MCNC: 17 MG/DL (ref 6–20)
BUN/CREAT SERPL: 15 (ref 7.3–30)
CALCIUM SPEC-SCNC: 9.4 MG/DL (ref 8.5–10.2)
CHLORIDE SERPL-SCNC: 103 MMOL/L (ref 98–107)
CO2 SERPL-SCNC: 26.6 MMOL/L (ref 22–29)
CREAT BLD-MCNC: 1.13 MG/DL (ref 0.7–1.3)
DEPRECATED RDW RBC AUTO: 37.9 FL (ref 37–49)
EOSINOPHIL # BLD AUTO: 0.14 10*3/MM3 (ref 0–0.36)
EOSINOPHIL NFR BLD AUTO: 3.5 % (ref 1–5)
ERYTHROCYTE [DISTWIDTH] IN BLOOD BY AUTOMATED COUNT: 12.4 % (ref 11.7–14.5)
GFR SERPL CREATININE-BSD FRML MDRD: 75 ML/MIN/1.73
GLOBULIN UR ELPH-MCNC: 2.9 GM/DL (ref 1.8–3.5)
GLUCOSE BLD-MCNC: 100 MG/DL (ref 74–124)
HCT VFR BLD AUTO: 46.1 % (ref 40–49)
HGB BLD-MCNC: 16.1 G/DL (ref 13.5–16.5)
HOLD SPECIMEN: NORMAL
IMM GRANULOCYTES # BLD: 0.01 10*3/MM3 (ref 0–0.03)
IMM GRANULOCYTES NFR BLD: 0.3 % (ref 0–0.5)
LDH SERPL-CCNC: 190 U/L (ref 99–259)
LYMPHOCYTES # BLD AUTO: 1.24 10*3/MM3 (ref 1–3.5)
LYMPHOCYTES NFR BLD AUTO: 31.2 % (ref 20–49)
MCH RBC QN AUTO: 29.3 PG (ref 27–33)
MCHC RBC AUTO-ENTMCNC: 34.9 G/DL (ref 32–35)
MCV RBC AUTO: 84 FL (ref 83–97)
MONOCYTES # BLD AUTO: 0.27 10*3/MM3 (ref 0.25–0.8)
MONOCYTES NFR BLD AUTO: 6.8 % (ref 4–12)
NEUTROPHILS # BLD AUTO: 2.27 10*3/MM3 (ref 1.5–7)
NEUTROPHILS NFR BLD AUTO: 57.2 % (ref 39–75)
NRBC BLD MANUAL-RTO: 0 /100 WBC (ref 0–0)
PLATELET # BLD AUTO: 164 10*3/MM3 (ref 150–375)
PMV BLD AUTO: 9.6 FL (ref 8.9–12.1)
POTASSIUM BLD-SCNC: 4.1 MMOL/L (ref 3.5–4.7)
PROT SERPL-MCNC: 7.6 G/DL (ref 6.3–8)
RBC # BLD AUTO: 5.49 10*6/MM3 (ref 4.3–5.5)
SODIUM BLD-SCNC: 140 MMOL/L (ref 134–145)
T4 FREE SERPL-MCNC: 1.24 NG/DL (ref 0.93–1.7)
TSH SERPL DL<=0.05 MIU/L-ACNC: 4.24 MIU/ML (ref 0.27–4.2)
WBC NRBC COR # BLD: 3.97 10*3/MM3 (ref 4–10)

## 2017-09-29 PROCEDURE — 85025 COMPLETE CBC W/AUTO DIFF WBC: CPT

## 2017-09-29 PROCEDURE — 36415 COLL VENOUS BLD VENIPUNCTURE: CPT | Performed by: INTERNAL MEDICINE

## 2017-09-29 PROCEDURE — 99215 OFFICE O/P EST HI 40 MIN: CPT | Performed by: INTERNAL MEDICINE

## 2017-09-29 PROCEDURE — 84443 ASSAY THYROID STIM HORMONE: CPT | Performed by: INTERNAL MEDICINE

## 2017-09-29 PROCEDURE — 83615 LACTATE (LD) (LDH) ENZYME: CPT | Performed by: INTERNAL MEDICINE

## 2017-09-29 PROCEDURE — 84439 ASSAY OF FREE THYROXINE: CPT | Performed by: INTERNAL MEDICINE

## 2017-09-29 PROCEDURE — 80053 COMPREHEN METABOLIC PANEL: CPT

## 2017-09-29 RX ORDER — PREDNISONE 20 MG/1
60 TABLET ORAL DAILY
Qty: 9 TABLET | Refills: 0 | Status: SHIPPED | OUTPATIENT
Start: 2017-09-29 | End: 2018-07-20

## 2017-09-29 NOTE — TELEPHONE ENCOUNTER
Called pt, left message:  appt with DAVE Pearl , 10/10/17, 1:00pm [facility]. He should call Ermelinda at [phone] to change or cancel. Call us with questions.

## 2017-09-29 NOTE — PROGRESS NOTES
"Missael Allen, 32, was seen for a social work assessment today.  Dr. Georges wrote an order for referral to Dr. Doris Guillory and scheduling called me.  The patient was still in our lab and came into my office.  He filled out the PhQ9 and GAD7 which I have scanned and will e-mail to DAVE Pearl.  His depression score was 16 (0 on self-harm).  His anxiety score was 18.  He told me that he had Lymphoma 6 years ago and was treated with Radiation Rx.  He began seeing Dr. Georges six months ago when he began having symptoms which he thought might be a recurrence of his lymphoma.(See MD  notes.)  He has an \"occlusion in his neck\" which is scar tissue from the Radiation treatment.  He will go to the [facility] in November to see if anything can be done to fix this (Dr. Georges says that vascular surgery is not possible, too dangerous). He says he has been having neurological symptoms. He has been assured that his brain scans are normal but he is still scared.  He thinks something may have been missed, perhaps he has multiple sclerosis or ALS (? I think he said).  He says he is obsessing  about these possibilities.  \"Once I got this in my head, I couldn't get it out\". He is having trouble falling asleep.    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  He says he is scared of death or \"loss of function\".  We discussed. If he has MS he will treat it and work until he can't do it anymore, which could be years, and then go on  Disability.  His PCP has given him a Generalized Anxiety Disorder Dx and gave him a prescription for Zoloft which he took for 7 days and then stopped for side effects.  She gave him one for Effexor which he has not taken.  He says he is afraid of taking anything which could mask his symptoms, which may, of course, be caused by anxiety, but may not also.  I would give him a Dx of Adjustment Disorder with Anxiety, F43.22, until all his tests come back clean. He " "has enough going on medically to be the \"environmental event\" that the Adjustment Disorder requires.  A related stressor is that the [facility] informed him that his co-pay there would be $5,000, which as a  he couldn't pay.  They recommended that he \"bump up\" his insurance, which, thankfully, he was able to do. His appointment at [facility] had to be moved back to November, but he will now owe them $2,500, which he says he can pay.    The patient lives by himself in [place].  His parents and other family are in [place], [place].  He has a supportive [place] of friends here, he says. Mr Allen talked easily, made good eye contact, no problems noted with thought content or process. He was oriented x 4, affect was appropriate. I think the patient could benefit from a prn prescription for anxiety and/ or for  sleep.  He may not want a medication he needs to take daily, unless he can be assured it won't have any pseudo-neurological side effects.  Ermelinda [facility] scheduled an appointment for him with DAVE Pearl on 10/10/17 at 1:00pm.  I called him and left a message with this info.  He has the number to cancel or change, if necessary. He has contact information for social work here and should feel free to call.  "

## 2017-09-30 NOTE — PROGRESS NOTES
Subjective .     REASONS FOR FOLLOWUP:   1. Diffuse large B-cell lymphoma diagnosed in 2011 status post chemotherapy with 8 cycles of CHOP Rituxan with complete remission.     2.  History of upper extremity DVTs for which she was on anticoagulation but it causes severe epistaxis. He does not tolerate aspirin either and hence not on anti-correlation.     3.  He has had bilateral pleural effusions for which she has had several thoracocentesis in the past.     4.  Status post excisional biopsy of left occipital lymph node.     5. 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      HISTORY OF PRESENT ILLNESS:  The patient is a 32 y.o. year old male who is here for follow-up with the above-mentioned history.    History of Present Illness  patient is a 32-year-old male with the above-mentioned history here  for symptoms of  SVC syndrome.  Patient went to the emergency room September 20, 2017 with night sweats dizziness.  Patient also had some shortness of breath and upper body swelling on and off secondary to his SVC syndrome.  He has seen Dr. Bimal Rich who has scheduled him an appointment with [facility] in and of November for the symptoms.  Patient has lost some weight.  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        Past Medical History:   Diagnosis Date   • Anxiety    • GERD (gastroesophageal reflux disease)    • Lymphoma     lymphoma   • Superior vena cava syndrome     has blockage to the brachiocephalics both sides   • TIA (transient ischemic attack)        ONCOLOGIC HISTORY:  (History from previous dates can be found in the separate document.)  32-year-old white male who has a remote history of non-Hodgkin's lymphoma, diffuse large cell from the mediastinum that was originally diagnosed and treated at [facility] 4 years ago. He presented with superior vena  cava syndrome and a mass that was occluding the superior vena cava. He had a [place] procedure to make the diagnosis and he ended up with pleural effusion, chest tubes in place and finally chemotherapy with 8 cycles of CHOP/Rituxan and subsequently radiation therapy to the mediastinum. He had issues in regard to superior vena cava syndrome on a chronic basis that unfortunately has not had any proper resolution because there is no resolution for the kind of circumstance that he has anyway. He has taken anticoagulants before because of previous thrombosis in the upper extremities veins, but he is not taking this anymore because of frequent epistaxis. In any event, for the last 2 weeks the patient has had nocturnal sweats and he has felt a left occipital lymph node coming and going. He has not had any alterations in appetite or weight, he has not had any fever and he has not had any rashes in the skin. He has not had any modification in cough, sputum production or shortness of breath, no pain in the chest, normal bowel activity, normal urination, no alteration in the penis, no alteration in the testicles and no alterations in the skin and no neurological dysfunction. He denies any other symptomatology related to lymphoma. The last time he was seen by the Hematology/Oncology Team at [facility] was a year ago on clinical grounds with no CT scans.       He was admitted yesterday due to symptoms as above and a CT scan documented a retrosternal mass, precardiac mass; we do not know if this a chronic or an acute new issue     Ct scan: 2/25/2070     There is a 5.8 x 3.2 x 6.3 cm (this mass was previously measured at 4.8  x 2.7 cm on the study of 1/9/2014) anterior mediastinal mass within the  superior mediastinum just behind the sternum predominantly to the right  of midline which posteriorly abuts the ascending aorta and the aortic  arch and causes complete occlusion of the upper portion of the  "superior  vena cava, the superior vena cava reconstitutes at the level of entry of  the azygos vein.       2.1 cm new right supraclavicular lymph node was not mentioned on the  previous study of 1/9/2014. Prominent collateral veins are seen  extending from the internal mammary veins and right subclavian vein into  the anterior chest wall predominantly to the right of midline.      No consolidation or effusion. Pleural-based 0.5 cm nodule in the right  middle lobe.      Upper abdominal viscera are essentially unremarkable.     CT abdomen pelvis 2/27 negative  PET scan 3/2/17  IMPRESSION:  1. Infiltrative anterior mediastinal lymphadenopathy has low-level  activity. There is otherwise no hypermetabolic lymphadenopathy.  2. Low level activity at the surgical bed at the posterior subcutaneous  soft tissues of the neck on the left.    Path on occipital to lymph node is negative    Discussion done in the multidisciplinary clinic in the lung cancer conference and patient not a candidate to do a mediastinoscopy again.  Given low uptake it was felt whether this could be just a residual scar tissue from his previous SVC syndrome.  However a decision was made to see if he can do a bone marrow and repeat CT scan in 2 months in order to follow-up this 6.3 cm mediastinal mass.    We have tried to contact Dr. Conner on several locations and we will recheck today.    Bone marrow aspiration biopsy 3/21/2017 shows slightly hypocellular marrow with maturing trilineage hematopoiesis but no evidence of any lymphoma, leukemia.  Cytogenetics is pending.  Flow is negative as well.    .  He was actually sent to the emergency room on 6/20/2017 because of this complaint.  He underwent an MRI of the brain that day, which was normal.  His symptoms have not improved since then.  He states that he has had some blurry vision as well as \"eye fatigue\".  He states that when object or people are moving he will have difficulty focusing while he is " tracking them.  He has been seen by an ophthalmologist, who advised the patient had degenerative changes and was advised to wear readers, which did not significantly help his symptoms.  He denies headaches, but states he has had some mild pressure behind his eyes and some mild sinus pressure.  He has been taking Tylenol Cold and sinus, and feels those symptoms are improving.  He denies fevers or chills.  He denies night sweats.  He has had a weight loss of a few pounds, but reports he has been eating better.  He denies any palpable masses or nodularity.  He reports he is breathing well denies any shortness of breath or cough.  He has noticed that his reflux has an worse the past 2 weeks.  He is currently taking Zantac 150 mg twice daily.  He is also having some intermittent nausea issues because of the reflux.    Patient states that since we started him on steroids 2 weeks ago by Kayley he is feeling better is currently on 20 mg daily and is to taper to 10 mg daily.  He has less fogginess now.  I believe the feeling of fullness in the head and fogginess is secondary to SVC syndrome.  He has a follow-up appointment with Dr. Bimal Rich to see if any further treatments like stents can be done for the SVC syndrome.    We had ordered a repeat CT scan of the neck chest abdomen pelvis 6/26/17 and there is no change in the mediastinal mass and he is stable.      No current facility-administered medications on file prior to encounter.        Current Outpatient Prescriptions on File Prior to Visit   Medication Sig Dispense Refill   • meclizine (ANTIVERT) 50 MG tablet Take 0.5 tablets by mouth 3 (three) times a day as needed for dizziness for up to 20 doses. 20 tablet 0   • naproxen sodium (ALEVE) 220 MG tablet Take 220 mg by mouth 2 (Two) Times a Day As Needed.     • omeprazole (priLOSEC) 20 MG capsule Take 1 capsule by mouth Daily. 30 capsule 1   • venlafaxine XR (EFFEXOR XR) 75 MG 24 hr capsule Take 1 capsule by mouth  Daily. 30 capsule 1     No current facility-administered medications on file prior to visit.        ALLERGIES:     Allergies   Allergen Reactions   • Zoloft [Sertraline Hcl]        Social History     Social History   • Marital status: Single     Spouse name: N/A   • Number of children: N/A   • Years of education: College     Occupational History   • Physical therapy tech Other [employer]     Social History Main Topics   • Smoking status: Never Smoker   • Smokeless tobacco: Never Used   • Alcohol use Yes      Comment: occasional, 1-2 drinks per week   • Drug use: No   • Sexual activity: Yes     Partners: Male     Other Topics Concern   • Not on file     Social History Narrative    Lives at home with his dog.  Works as a Physical Therapist.           Cancer-related family history includes Cancer in his maternal grandfather, maternal grandmother, paternal grandfather, and paternal grandmother.     Review of Systems   Constitutional: Negative.  Negative for activity change, appetite change, chills, diaphoresis, fever and unexpected weight change.   HENT: Negative.  Negative for mouth sores and trouble swallowing.    Eyes: Positive for visual disturbance.   Respiratory: Negative.  Negative for cough and shortness of breath.    Cardiovascular: Negative.  Negative for chest pain and leg swelling.   Gastrointestinal: Positive for nausea. Negative for abdominal pain, constipation, diarrhea and vomiting.        See HPI     Genitourinary: Negative.  Negative for difficulty urinating.   Musculoskeletal: Negative.  Negative for arthralgias and joint swelling.   Skin: Negative.  Negative for rash.   Neurological: Negative.         See HPI     Hematological: Negative.  Negative for adenopathy. Does not bruise/bleed easily.   Psychiatric/Behavioral: Negative.  Negative for sleep disturbance.       Objective      Vitals:    09/29/17 0844   BP: 138/88   Pulse: 80   Resp: 12   Temp: 98.4 °F (36.9 °C)   TempSrc: Oral   SpO2: 98%  "  Weight: 211 lb 6.4 oz (95.9 kg)   Height: 70.67\" (179.5 cm)  Comment: new height   PainSc: 3  Comment: pain in both arms     Current Status 9/29/2017   ECOG score 0       Physical Exam   Constitutional: He is oriented to person, place, and time. He appears well-developed and well-nourished.   HENT:   Head: Normocephalic and atraumatic.   Nose: Nose normal.   Mouth/Throat: Oropharynx is clear and moist and mucous membranes are normal. No oropharyngeal exudate.   Eyes: Pupils are equal, round, and reactive to light.   Neck: Normal range of motion. Neck supple.   Cardiovascular: Normal rate, regular rhythm and normal heart sounds.    Pulmonary/Chest: Effort normal and breath sounds normal.   Abdominal: Soft. Normal appearance and bowel sounds are normal. He exhibits no distension. There is no hepatosplenomegaly. There is no tenderness.   Musculoskeletal: Normal range of motion.   Lymphadenopathy:     He has no cervical adenopathy.        Right: No supraclavicular adenopathy present.        Left: No supraclavicular adenopathy present.   Neurological: He is alert and oriented to person, place, and time.   Skin: Skin is warm and dry.   Psychiatric: He has a normal mood and affect. His behavior is normal.   Nursing note and vitals reviewed.        RECENT LABS:  Hematology WBC   Date Value Ref Range Status   09/29/2017 3.97 (L) 4.00 - 10.00 10*3/mm3 Final     RBC   Date Value Ref Range Status   09/29/2017 5.49 4.30 - 5.50 10*6/mm3 Final     Hemoglobin   Date Value Ref Range Status   09/29/2017 16.1 13.5 - 16.5 g/dL Final     Hematocrit   Date Value Ref Range Status   09/29/2017 46.1 40.0 - 49.0 % Final     Platelets   Date Value Ref Range Status   09/29/2017 164 150 - 375 10*3/mm3 Final          Lab Results   Component Value Date    GLUCOSE 100 09/29/2017    BUN 17 09/29/2017    CREATININE 1.13 09/29/2017    EGFRIFNONA 75 09/29/2017    BCR 15.0 09/29/2017    K 4.1 09/29/2017    CO2 26.6 09/29/2017    CALCIUM 9.4 " 09/29/2017    ALBUMIN 4.70 09/29/2017    LABIL2 1.6 09/29/2017    AST 14 09/29/2017    ALT 14 09/29/2017                 Assessment/Plan   1.  Diffuse large B-cell lymphoma status post CHOP Rituxan in 2011 with a complete remission. He follows up with Dr. Conner at [facility] and saw him last year. We will need to get the records from there and I have requested it.   Patient had a PET/CT scan on 3/2/2017.  It showed infiltrative anterior mediastinal lymphadenopathy has low-level activity. There is otherwise no hypermetabolic lymphadenopathy.  2. Low level activity at the surgical bed at the posterior subcutaneous  soft tissues of the neck on the left.     2. New onset shortness of breath with night sweats, the scan shows 5.8 into 6.3-3.2 cm mass in the anterior mediastinum within the superior mediastinum just behind the sternum predominantly on the right of the midline which abuts the ascending aorta and aortic arch and causes of occlusion of the upper portion of the superior vena cava. Patient also has 2.1 cm right supraclavicular lymph node there is a pleural based 0.57 with a nodule in the right middle lobe.  He had a left neck node excised and path was negative Given pathology is negative we will need to refer patient to thoracic oncology to see if a mediastinoscopy with biopsy can be obtained in order to get the diagnosis.  If this turns out to be diffuse large B-cell lymphoma he would require salvage chemotherapy followed by autologous stem cell transplant.    Given that repeat mediastinoscopy is difficult in this situation, a decision was made to consider bone marrow aspiration and biopsy which is also negative.  We will refer patient to thoracic surgery following repeat CT scan.        3.  Patient with a history of SVC syndrome with prior failed SVC bypass.  He is now having nonsystemic neurologic symptoms.  He is feeling foggy headed and having some visual changes.  He had an MRI of the brain  was negative.  Patient was referred to Dr. Bimal Rich who has now scheduled him with [facility] and of November for the symptoms.    4.  Depression and anxiety,  discussed with him today and since his distress score is very high be able to refer him to Dr. Guillory.    5.  Reaction to contrast dye, agent will require steroids and Benadryl prior to CT with contrast      Plan     1.  Refer patient to Dr. Guillory for us significant depression and anxiety.    2.  Obtain CT scan with contrast of the neck chest abdomen pelvis.    3.  Give prednisone 60 mg to large prior, 6 hours prior and 30 minutes prior to CT scan with Benadryl 50 mg 30 minutes prior to CT scan.    4.  Follow-up scheduled with thoracic surgery to see if they have any other input on the soft tissue mediastinal mass.     5.  Await input from [facility] for his SVC syndrome.    6.  Follow-up with me in 3 weeks.        .Ailyn Georges MD                    Cc:  Librado Valdes*

## 2017-10-02 ENCOUNTER — TELEPHONE (OUTPATIENT)
Dept: ONCOLOGY | Facility: HOSPITAL | Age: 32
End: 2017-10-02

## 2017-10-02 ENCOUNTER — TELEPHONE (OUTPATIENT)
Dept: ONCOLOGY | Facility: CLINIC | Age: 32
End: 2017-10-02

## 2017-10-02 DIAGNOSIS — C83.33 DIFFUSE LARGE B-CELL LYMPHOMA OF INTRA-ABDOMINAL LYMPH NODES (HCC): Primary | ICD-10-CM

## 2017-10-02 NOTE — TELEPHONE ENCOUNTER
----- Message from Teodora Howe RN sent at 10/2/2017  8:25 AM EDT -----  Referral placed for Endicrinology- Dr Musa in 1-2 months

## 2017-10-02 NOTE — TELEPHONE ENCOUNTER
Called and notified patient.  Referral placed for Dr Musa. Message sent to scheduling. Pt v/u     ----- Message from Ailyn Georges MD sent at 10/1/2017  9:20 AM EDT -----  Please notify the patient that his TSH is mildly elevated and he needs to be referred to endocrinology to see if he needs to be started on any medications for hypothyroidism or not.  Please schedule an appointment with either Dr. Musa or Dr. oakley and 1-2 months.  Ailyn Georges MD

## 2017-10-04 DIAGNOSIS — E03.9 HYPOTHYROIDISM, UNSPECIFIED TYPE: Primary | ICD-10-CM

## 2017-10-06 ENCOUNTER — HOSPITAL ENCOUNTER (OUTPATIENT)
Dept: PET IMAGING | Facility: HOSPITAL | Age: 32
Discharge: HOME | End: 2017-10-06
Attending: INTERNAL MEDICINE | Admitting: INTERNAL MEDICINE

## 2017-10-06 DIAGNOSIS — R53.83 OTHER FATIGUE: ICD-10-CM

## 2017-10-06 LAB — CREAT BLDA-MCNC: 0.9 MG/DL (ref 0.6–1.3)

## 2017-10-06 PROCEDURE — 71260 CT THORAX DX C+: CPT

## 2017-10-06 PROCEDURE — 0 IOPAMIDOL 61 % SOLUTION: Performed by: INTERNAL MEDICINE

## 2017-10-06 PROCEDURE — 70491 CT SOFT TISSUE NECK W/DYE: CPT

## 2017-10-06 PROCEDURE — 74177 CT ABD & PELVIS W/CONTRAST: CPT

## 2017-10-06 PROCEDURE — 0 DIATRIZOATE MEGLUMINE & SODIUM PER 1 ML: Performed by: INTERNAL MEDICINE

## 2017-10-06 PROCEDURE — 82565 ASSAY OF CREATININE: CPT

## 2017-10-06 RX ADMIN — IOPAMIDOL 85 ML: 612 INJECTION, SOLUTION INTRAVENOUS at 08:25

## 2017-10-06 RX ADMIN — DIATRIZOATE MEGLUMINE AND DIATRIZOATE SODIUM 30 ML: 660; 100 LIQUID ORAL; RECTAL at 07:30

## 2017-10-10 ENCOUNTER — OFFICE VISIT (OUTPATIENT)
Dept: PSYCHIATRY | Facility: HOSPITAL | Age: 32
End: 2017-10-10

## 2017-10-10 DIAGNOSIS — F51.01 PRIMARY INSOMNIA: ICD-10-CM

## 2017-10-10 DIAGNOSIS — F41.1 GENERALIZED ANXIETY DISORDER: Primary | ICD-10-CM

## 2017-10-10 PROCEDURE — G0463 HOSPITAL OUTPT CLINIC VISIT: HCPCS | Performed by: NURSE PRACTITIONER

## 2017-10-10 PROCEDURE — 90792 PSYCH DIAG EVAL W/MED SRVCS: CPT | Performed by: NURSE PRACTITIONER

## 2017-10-10 RX ORDER — VENLAFAXINE HYDROCHLORIDE 37.5 MG/1
37.5 CAPSULE, EXTENDED RELEASE ORAL DAILY
Qty: 30 CAPSULE | Refills: 2 | Status: SHIPPED | OUTPATIENT
Start: 2017-10-10 | End: 2018-07-20

## 2017-10-10 NOTE — PROGRESS NOTES
Subjective  Patient ID: Scott Allen is a 32 y.o. male who presents to [facility] for initial evaluation, per referral by Dr. Georges and initial screening per IRVIN BrooksW.     Pt is 32 year old male, appearing stated age, who presents to clinic for evaluation of complex emotions and anxiety surrounding complicated medical journey. PHQ9=16 and GAD7=18 as completed by [facility] on 9/29/17 following visit with Dr. Georges. Today, pt is well composed with appropriate affect. Pt discusses current sx in context of previous medical history beginning with prolonged diagnosis of mantle cell lymphoma in 2011. Pt states he reported to ER on two separate occassions before receiving accurate diagnosis. Pt received tx through [facility], and achieved remission.  In January of 2017, pt began noticing increase in fatigue, shortness of air, and night sweats which concerned him for lymphoma recurrence. At this point, he became connected with Dr. Georges who has been following him since. 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 Significant blockage without surgical option. At presents, multiple tests have been done which have been inconsistent with recurrence. Pt endorses sx of fatigue, SOA, coldness in fingers and toes, foggy cognition, shallow breathing, night sweats, issues with balance and dizziness, and redness of skin. These tend to wax and wane. Pt is to be seen at [facility] hopefully in November for second opinion/ possible treatment of SVC. Over past 5-6 weeks, pt feels these sx have worsened. This has precipitated two visits to the emergency room, and pt endorses significant fears of MS or ALS. MRI of head has been negative, and pt understands, but he continues to struggle with helplessness surrounding cause of sx. Pt does endorse some psychiatric history including chronically high anxiety and panic attacks in self and family,  beginning in childhood. Pt was initiated on a medication for this (unsure what medication) in his early 20s, but his family was not accepting of his need for it. Pt does report having a difficult time with losing his grandmother and with previous cancer dx, but has never been dx with depression.  Recently, pt discussed sx with PCP who initiated zoloft, dose unknown. Pt experienced significant SE of gastric reflux, teeth grinding, need for mechanical release for jaw to open, jitteryness. Stopped after 3 days and talked to practitioner. Took for 4 more days. Experienced weakness in arms. Coldness in fingers and toes. Again talked to practitioner who took him off zoloft and put him on 75 mg Effexor. He has not initiated Effexor. Pt denies formal psychiatric history in family, although does have brother and cousin who both were hit by trains. One was thrown onto track with death before hit, other unsure of situation. Pt denies hx of SI in past or present. Pt with minimal hx of social drinking and no current alcohol, tobacco, or drug use. Recent labs do indicate elevated TSH, for which he has been referred to endocrinology. Plan has appt this Thursday. Pt reports significant hx of thyroid malfunction in family and is hopeful this is to blame for some of his symptoms. Today, pt is agreeable to initiating new medication regimen and is hopeful for consistent anxiety control and insomnia mgmt. Major fears of slow death death. Has discussed Adv care planning with family members who know his wishes, should his health decline.    Psychiatric  Phenergan for nausea as child due to persistent and intense anxiety  When 20, discussed high anxiety with PCP. Initiated medication (?Wellbutrin, unsure). Living with grandmother who did not believe in medications and took them. Pt thus stopped taking.  Very high social anxiety with panic attacks all his life. Pt has worked very hard on this.  Mother and maternal grandmother both high  "strung, anxiety, insomnia  During tx, took ativan with chemo and as needed for anxiety. 4-5 days a week. Very helpful, rested well, not appropriate for day because he could not engage.  Questionable depression with events- grandma's passing, previous cancer, current situation  Describes his mother and brother as being \"child like\"  Brother hit by train; unsure of situation  Cousin also hit by train, but thrown onto tracks and dead before hit  Pt denies hx of self harm, SI, past or present  Denies alcohol, drug, or tobacco use    Medical History  Diagnosed with mantle cell lymphoma in 2011  Hx of headaches  Chronic difficulty falling asleep, up and down many times during night. Nonrestorative. Has always been fatigued.  Stuck in birth canal at birth, emergency C section  Skin condition icthiosis  High autoimmune dx in family: Dad with Celiacs, several members of dad's family with insulin resistant diabetes  Cousins with subclinical hypothyroid  Many individuals with varied types of cancer  January, 21 year old cousin also has dx with mantle cell lymphoma  Maternal grandmother passed suddenly of unknown cause  Maternal grandfather passed in appx 1 week of dx of kidney cancer     Social History  Raised by teenage unmarried parents. Mom present, Dad not consistently there for first 10 years. Current support includes father and step mother.  Mainly supported by mother's parents, left to live with them at 15.  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 Fogginess makes this difficult but not impossible.  Work at [employer], servere and profoud intellectual disability. See death differently.     PHQ9 Total Score: 16  EBENEZER 7 Total Score: 18    The following portions of the patient's history were reviewed and updated as " appropriate:   He  has a past medical history of Anxiety; GERD (gastroesophageal reflux disease); Hypothyroidism; Lymphoma; Superior vena cava syndrome; and TIA (transient ischemic attack).  He  does not have any pertinent problems on file.  He  has a past surgical history that includes Rib biopsy (Right, 2011); Excision Mass Head/Neck (Left, 2/27/2017); Brachiocephalic vein angioplasty / stenting (Bilateral); Skin biopsy; Chest tube insertion (Right); and Thoracentesis (Right).  His family history includes Cancer in his maternal grandfather, maternal grandmother, paternal grandfather, and paternal grandmother; Diabetes in his paternal grandmother.  He  reports that he has never smoked. He has never used smokeless tobacco. He reports that he drinks alcohol. He reports that he does not use illicit drugs.  Current Outpatient Prescriptions   Medication Sig Dispense Refill   • meclizine (ANTIVERT) 50 MG tablet Take 0.5 tablets by mouth 3 (three) times a day as needed for dizziness for up to 20 doses. 20 tablet 0   • naproxen sodium (ALEVE) 220 MG tablet Take 220 mg by mouth 2 (Two) Times a Day As Needed.     • omeprazole (priLOSEC) 20 MG capsule Take 1 capsule by mouth Daily. 30 capsule 1   • predniSONE (DELTASONE) 20 MG tablet Take 3 tablets by mouth Daily. Three po 12 hrs prior , 6 hours prior, 30 mts prior to the ct scan 9 tablet 0   • SYNTHROID 75 MCG tablet Take 1 tablet by mouth Daily. On empty stomach 30 tablet 5   • venlafaxine XR (EFFEXOR XR) 37.5 MG 24 hr capsule Take 1 capsule by mouth Daily. 30 capsule 2     No current facility-administered medications for this visit.      Current Outpatient Prescriptions on File Prior to Visit   Medication Sig   • meclizine (ANTIVERT) 50 MG tablet Take 0.5 tablets by mouth 3 (three) times a day as needed for dizziness for up to 20 doses.   • naproxen sodium (ALEVE) 220 MG tablet Take 220 mg by mouth 2 (Two) Times a Day As Needed.   • omeprazole (priLOSEC) 20 MG capsule Take  1 capsule by mouth Daily.   • predniSONE (DELTASONE) 20 MG tablet Take 3 tablets by mouth Daily. Three po 12 hrs prior , 6 hours prior, 30 mts prior to the ct scan     No current facility-administered medications on file prior to visit.      He is allergic to zoloft [sertraline hcl].    Review of Systems   Constitutional: Positive for diaphoresis and fatigue.   Eyes:        Pt reports bilateral pressure behind eyes and some visual changes   Respiratory: Positive for shortness of breath.    Musculoskeletal: Negative for gait problem.   Skin: Positive for color change.   Neurological:        Foggy headedness   Psychiatric/Behavioral: Positive for decreased concentration, dysphoric mood and sleep disturbance. Negative for self-injury and suicidal ideas. The patient is nervous/anxious.        Objective   Mental Status Exam  Appearance:  clean and casually dressed, appropriate  Attitude toward clinician:  cooperative and agreeable   Speech:    Rate:  regular rate and rhythm   Volume:  normal  Motor:  no abnormal movements present  Mood:  Anxious, worried, but hopeful  Affect:  euthymic and mood congruent  Thought Processes:  linear, logical, and goal directed  Thought Content:  normal  Suicidal Thoughts:  absent  Homicidal Thoughts:  absent  Perceptual Disturbance: no perceptual disturbance  Attention and Concentration:  good  Insight and Judgement:  good  Memory:  memory appears to be intact    Physical Exam   Constitutional: He is oriented to person, place, and time.   Neurological: He is alert and oriented to person, place, and time.   Psychiatric: He has a normal mood and affect. His speech is normal and behavior is normal. Judgment and thought content normal. Cognition and memory are normal.     Station and gait observed to be WNL.    Lab Review:   Hospital Outpatient Visit on 10/06/2017   Component Date Value   • Creatinine 10/06/2017 0.90    Office Visit on 09/29/2017   Component Date Value   • LDH 09/29/2017 190     Lab on 09/29/2017   Component Date Value   • Glucose 09/29/2017 100    • BUN 09/29/2017 17    • Creatinine 09/29/2017 1.13    • Sodium 09/29/2017 140    • Potassium 09/29/2017 4.1    • Chloride 09/29/2017 103    • CO2 09/29/2017 26.6    • Calcium 09/29/2017 9.4    • Total Protein 09/29/2017 7.6    • Albumin 09/29/2017 4.70    • ALT (SGPT) 09/29/2017 14    • AST (SGOT) 09/29/2017 14    • Alkaline Phosphatase 09/29/2017 54    • Total Bilirubin 09/29/2017 0.7    • eGFR Non  Amer 09/29/2017 75    • Globulin 09/29/2017 2.9    • A/G Ratio 09/29/2017 1.6    • BUN/Creatinine Ratio 09/29/2017 15.0    • Anion Gap 09/29/2017 10.4    • WBC 09/29/2017 3.97*   • RBC 09/29/2017 5.49    • Hemoglobin 09/29/2017 16.1    • Hematocrit 09/29/2017 46.1    • MCV 09/29/2017 84.0    • MCH 09/29/2017 29.3    • MCHC 09/29/2017 34.9    • RDW 09/29/2017 12.4    • RDW-SD 09/29/2017 37.9    • MPV 09/29/2017 9.6    • Platelets 09/29/2017 164    • Neutrophil % 09/29/2017 57.2    • Lymphocyte % 09/29/2017 31.2    • Monocyte % 09/29/2017 6.8    • Eosinophil % 09/29/2017 3.5    • Basophil % 09/29/2017 1.0    • Immature Grans % 09/29/2017 0.3    • Neutrophils, Absolute 09/29/2017 2.27    • Lymphocytes, Absolute 09/29/2017 1.24    • Monocytes, Absolute 09/29/2017 0.27    • Eosinophils, Absolute 09/29/2017 0.14    • Basophils, Absolute 09/29/2017 0.04    • Immature Grans, Absolute 09/29/2017 0.01    • nRBC 09/29/2017 0.0    • Extra Tube 09/29/2017 Hold for add-ons.    • TSH 09/29/2017 4.240*   • Free T4 09/29/2017 1.24    Admission on 09/23/2017, Discharged on 09/23/2017   Component Date Value   • Glucose 09/23/2017 102*   • BUN 09/23/2017 18    • Creatinine 09/23/2017 1.05    • Sodium 09/23/2017 138    • Potassium 09/23/2017 3.8    • Chloride 09/23/2017 100    • CO2 09/23/2017 21.4*   • Calcium 09/23/2017 9.6    • Total Protein 09/23/2017 7.4    • Albumin 09/23/2017 4.30    • ALT (SGPT) 09/23/2017 13    • AST (SGOT) 09/23/2017 8    • Alkaline  Phosphatase 09/23/2017 48    • Total Bilirubin 09/23/2017 0.9    • eGFR Non  Amer 09/23/2017 82    • Globulin 09/23/2017 3.1    • A/G Ratio 09/23/2017 1.4    • BUN/Creatinine Ratio 09/23/2017 17.1    • Anion Gap 09/23/2017 16.6    • Protime 09/23/2017 14.4*   • INR 09/23/2017 1.16*   • Color, UA 09/23/2017 Dark Yellow*   • Appearance, UA 09/23/2017 Clear    • pH, UA 09/23/2017 5.5    • Specific Gravity, UA 09/23/2017 1.028    • Glucose, UA 09/23/2017 Negative    • Ketones, UA 09/23/2017 Negative    • Bilirubin, UA 09/23/2017 Negative    • Blood, UA 09/23/2017 Negative    • Protein, UA 09/23/2017 Negative    • Leuk Esterase, UA 09/23/2017 Negative    • Nitrite, UA 09/23/2017 Negative    • Urobilinogen, UA 09/23/2017 0.2 E.U./dL    • ADENOVIRUS, PCR 09/23/2017 Not Detected    • Coronavirus 229E 09/23/2017 Not Detected    • Coronavirus HKU1 09/23/2017 Not Detected    • Coronavirus NL63 09/23/2017 Not Detected    • Coronavirus OC43 09/23/2017 Not Detected    • Human Metapneumovirus 09/23/2017 Not Detected    • Human Rhinovirus/Enterov* 09/23/2017 Not Detected    • Influenza B PCR 09/23/2017 Not Detected    • Parainfluenza Virus 1 09/23/2017 Not Detected    • Parainfluenza Virus 2 09/23/2017 Not Detected    • Parainfluenza Virus 3 09/23/2017 Not Detected    • Parainfluenza Virus 4 09/23/2017 Not Detected    • Bordetella pertussis pcr 09/23/2017 Not Detected    • Influenza 2009 H1N1 by P* 09/23/2017 Not Detected    • Chlamydophila pneumoniae* 09/23/2017 Not Detected    • Mycoplasma pneumo by PCR 09/23/2017 Not Detected    • Influenza A PCR 09/23/2017 Not Detected    • Influenza A H3 09/23/2017 Not Detected    • Influenza A H1 09/23/2017 Not Detected    • RSV, PCR 09/23/2017 Not Detected    • WBC 09/23/2017 4.12*   • RBC 09/23/2017 5.22    • Hemoglobin 09/23/2017 15.5    • Hematocrit 09/23/2017 44.4    • MCV 09/23/2017 85.1    • MCH 09/23/2017 29.7    • MCHC 09/23/2017 34.9    • RDW 09/23/2017 12.8    • RDW-SD  09/23/2017 39.6    • MPV 09/23/2017 9.9    • Platelets 09/23/2017 144    • Neutrophil % 09/23/2017 65.8    • Lymphocyte % 09/23/2017 25.2    • Monocyte % 09/23/2017 6.1    • Eosinophil % 09/23/2017 2.2    • Basophil % 09/23/2017 0.7    • Immature Grans % 09/23/2017 0.0    • Neutrophils, Absolute 09/23/2017 2.71    • Lymphocytes, Absolute 09/23/2017 1.04    • Monocytes, Absolute 09/23/2017 0.25    • Eosinophils, Absolute 09/23/2017 0.09    • Basophils, Absolute 09/23/2017 0.03    • Immature Grans, Absolute 09/23/2017 0.00    Admission on 09/20/2017, Discharged on 09/20/2017   Component Date Value   • Glucose 09/20/2017 86    • BUN 09/20/2017 20    • Creatinine 09/20/2017 1.09    • Sodium 09/20/2017 141    • Potassium 09/20/2017 4.0    • Chloride 09/20/2017 102    • CO2 09/20/2017 23.9    • Calcium 09/20/2017 9.6    • Total Protein 09/20/2017 8.0    • Albumin 09/20/2017 4.70    • ALT (SGPT) 09/20/2017 16    • AST (SGOT) 09/20/2017 12    • Alkaline Phosphatase 09/20/2017 54    • Total Bilirubin 09/20/2017 0.8    • eGFR Non  Amer 09/20/2017 78    • Globulin 09/20/2017 3.3    • A/G Ratio 09/20/2017 1.4    • BUN/Creatinine Ratio 09/20/2017 18.3    • Anion Gap 09/20/2017 15.1    • WBC 09/20/2017 5.34    • RBC 09/20/2017 5.42    • Hemoglobin 09/20/2017 16.2    • Hematocrit 09/20/2017 46.5    • MCV 09/20/2017 85.8    • MCH 09/20/2017 29.9    • MCHC 09/20/2017 34.8    • RDW 09/20/2017 12.8    • RDW-SD 09/20/2017 39.9    • MPV 09/20/2017 9.5    • Platelets 09/20/2017 155    • Neutrophil % 09/20/2017 66.5    • Lymphocyte % 09/20/2017 24.7    • Monocyte % 09/20/2017 7.3    • Eosinophil % 09/20/2017 0.9    • Basophil % 09/20/2017 0.6    • Immature Grans % 09/20/2017 0.0    • Neutrophils, Absolute 09/20/2017 3.55    • Lymphocytes, Absolute 09/20/2017 1.32    • Monocytes, Absolute 09/20/2017 0.39    • Eosinophils, Absolute 09/20/2017 0.05    • Basophils, Absolute 09/20/2017 0.03    • Immature Grans, Absolute 09/20/2017 0.00         There are no diagnoses linked to this encounter.    Plan of Care  Pt with complex social and medical history. Discussed with patient goals of care to include mgmt of chronic anxiety with acute presentation due to exacerbation of medical health issues. Plan to initiate low dose trial of effexor xr due to previous intolerance of SSRI. Pt advised to take CHARLI prilosec short term if noted increase in reflux. Discussed potential for GI consult if persistent. Additionally discussed slow titration of low dose gabapentin, 100 mg qhs days 1-3, 100 mg at dinner and bedtime days 4-6, and 100 mg at dinner, 200 mg qhs days 7 onward. Recommended this option to Dr. Georges, who will prescribe. Plan to follow pt in four weeks for continued mgmt of depression and anxiety.

## 2017-10-11 ENCOUNTER — TELEPHONE (OUTPATIENT)
Dept: PSYCHIATRY | Facility: HOSPITAL | Age: 32
End: 2017-10-11

## 2017-10-12 ENCOUNTER — OFFICE VISIT (OUTPATIENT)
Dept: ENDOCRINOLOGY | Age: 32
End: 2017-10-12

## 2017-10-12 VITALS
BODY MASS INDEX: 30.1 KG/M2 | DIASTOLIC BLOOD PRESSURE: 70 MMHG | SYSTOLIC BLOOD PRESSURE: 122 MMHG | HEIGHT: 71 IN | WEIGHT: 215 LBS

## 2017-10-12 DIAGNOSIS — R53.82 CHRONIC FATIGUE: ICD-10-CM

## 2017-10-12 DIAGNOSIS — E03.9 PRIMARY HYPOTHYROIDISM: Primary | ICD-10-CM

## 2017-10-12 DIAGNOSIS — R68.89 COLD INTOLERANCE: ICD-10-CM

## 2017-10-12 DIAGNOSIS — R26.89 BALANCE PROBLEM: ICD-10-CM

## 2017-10-12 PROCEDURE — 99204 OFFICE O/P NEW MOD 45 MIN: CPT | Performed by: INTERNAL MEDICINE

## 2017-10-12 RX ORDER — LEVOTHYROXINE SODIUM 75 MCG
75 TABLET ORAL DAILY
Qty: 30 TABLET | Refills: 5 | Status: SHIPPED | OUTPATIENT
Start: 2017-10-12 | End: 2018-06-18 | Stop reason: SDUPTHER

## 2017-10-12 NOTE — PROGRESS NOTES
"Brigitte Allen is a 32 y.o. male is here as a new patient for hypothyroidism. Patient is experiencing a fog, dizziness and swelling in neck. Muscle weakness in arms and lethargic. LAb review. /70  Ht 71\" (180.3 cm)  Wt 215 lb (97.5 kg)  BMI 29.99 kg/m2    Allergies   Allergen Reactions   • Zoloft [Sertraline Hcl]          Current Outpatient Prescriptions:   •  meclizine (ANTIVERT) 50 MG tablet, Take 0.5 tablets by mouth 3 (three) times a day as needed for dizziness for up to 20 doses., Disp: 20 tablet, Rfl: 0  •  naproxen sodium (ALEVE) 220 MG tablet, Take 220 mg by mouth 2 (Two) Times a Day As Needed., Disp: , Rfl:   •  omeprazole (priLOSEC) 20 MG capsule, Take 1 capsule by mouth Daily., Disp: 30 capsule, Rfl: 1  •  predniSONE (DELTASONE) 20 MG tablet, Take 3 tablets by mouth Daily. Three po 12 hrs prior , 6 hours prior, 30 mts prior to the ct scan, Disp: 9 tablet, Rfl: 0  •  venlafaxine XR (EFFEXOR XR) 37.5 MG 24 hr capsule, Take 1 capsule by mouth Daily., Disp: 30 capsule, Rfl: 2      History of Present Illness this is a 32-year-old gentleman who is a known patient with B-cell lymphoma and currently after undergoing therapy is in full remission.  Since the beginning of the year 2017 and progressively getting worse he is complaining of pronounced weakness and fatigue in his shoulder muscles and arms and also having sense of she will and cold feelings in his arms and legs particularly underneath his fingernails and toenails.  His family history is positive for hypothyroidism as his 2 maternal cousins have hypothyroidism and taking thyroid medications.    The following portions of the patient's history were reviewed and updated as appropriate: allergies, current medications, past family history, past medical history, past social history, past surgical history and problem list.    Review of Systems   Constitutional: Positive for fatigue.   HENT: Negative for trouble swallowing.    Eyes: Negative " for visual disturbance.   Respiratory: Negative for shortness of breath.    Cardiovascular: Negative for leg swelling.   Endocrine: Positive for cold intolerance.   Skin: Negative for wound.   Neurological: Negative for numbness.       Objective   Physical Exam   Constitutional: He is oriented to person, place, and time. He appears well-developed and well-nourished. No distress.   HENT:   Head: Normocephalic and atraumatic.   Right Ear: External ear normal.   Left Ear: External ear normal.   Nose: Nose normal.   Mouth/Throat: Oropharynx is clear and moist and mucous membranes are normal. No oropharyngeal exudate.   Eyes: EOM are normal. Pupils are equal, round, and reactive to light. Left eye exhibits no discharge. No scleral icterus.   Neck: Normal range of motion. Neck supple. No JVD present. No tracheal deviation present. No thyromegaly present.   Cardiovascular: Normal rate, regular rhythm, normal heart sounds and intact distal pulses.  Exam reveals no gallop and no friction rub.    No murmur heard.  Pulmonary/Chest: Effort normal and breath sounds normal. No respiratory distress. He has no wheezes. He has no rales. He exhibits no tenderness.   Abdominal: Soft. Normal appearance and bowel sounds are normal. He exhibits no distension and no mass. There is no hepatosplenomegaly. There is no tenderness. There is no rebound and no guarding. No hernia.   Musculoskeletal: Normal range of motion. He exhibits no edema, tenderness or deformity.   Lymphadenopathy:     He has no cervical adenopathy.        Right: No supraclavicular adenopathy present.        Left: No supraclavicular adenopathy present.   Neurological: He is alert and oriented to person, place, and time. He has normal reflexes. He displays normal reflexes. No cranial nerve deficit. He exhibits normal muscle tone. Coordination normal.   Skin: Skin is warm and dry. No rash noted. He is not diaphoretic. No erythema. No pallor.   Psychiatric: He has a normal  mood and affect. His behavior is normal. Judgment and thought content normal.   Nursing note and vitals reviewed.        Assessment/Plan   Diagnoses and all orders for this visit:    Primary hypothyroidism  -     T3, Free  -     T4 & TSH ([facility])  -     Thyroglobulin With Anti-TG  -     T4, Free  -     TestT+TestF+SHBG  -     Uric Acid  -     Vitamin D 25 Hydroxy  -     Comprehensive Metabolic Panel  -     PSA  -     Prolactin  -     Follicle Stimulating Hormone  -     Lipid Panel  -     Luteinizing Hormone  -     ACTH  -     Cortisol    Chronic fatigue  -     T3, Free  -     T4 & TSH ([facility])  -     Thyroglobulin With Anti-TG  -     T4, Free  -     TestT+TestF+SHBG  -     Uric Acid  -     Vitamin D 25 Hydroxy  -     Comprehensive Metabolic Panel  -     PSA  -     Prolactin  -     Follicle Stimulating Hormone  -     Lipid Panel  -     Luteinizing Hormone  -     ACTH  -     Cortisol    Cold intolerance  -     T3, Free  -     T4 & TSH ([facility])  -     Thyroglobulin With Anti-TG  -     T4, Free  -     TestT+TestF+SHBG  -     Uric Acid  -     Vitamin D 25 Hydroxy  -     Comprehensive Metabolic Panel  -     PSA  -     Prolactin  -     Follicle Stimulating Hormone  -     Lipid Panel  -     Luteinizing Hormone  -     ACTH  -     Cortisol    Balance problem  -     T3, Free  -     T4 & TSH ([facility])  -     Thyroglobulin With Anti-TG  -     T4, Free  -     TestT+TestF+SHBG  -     Uric Acid  -     Vitamin D 25 Hydroxy  -     Comprehensive Metabolic Panel  -     PSA  -     Prolactin  -     Follicle Stimulating Hormone  -     Lipid Panel  -     Luteinizing Hormone  -     ACTH  -     Cortisol    Other orders  -     SYNTHROID 75 MCG tablet; Take 1 tablet by mouth Daily. On empty stomach             in summary I saw and examined this 32-year-old gentleman for above-mentioned problems.  I reviewed his laboratory evaluation of 09/29/2017 and at this point we will go ahead and order additional evaluations and as soon as the results  come back we will go ahead and call for any possible modification or further investigations.  For the time being I am gonna go ahead and start him on Synthroid 75 µg daily.  He will see Ms. javed Arias in the 3 months or sooner if needed with laboratory evaluation prior to each office visit.

## 2017-10-13 PROBLEM — F51.01 PRIMARY INSOMNIA: Status: ACTIVE | Noted: 2017-10-13

## 2017-10-13 RX ORDER — GABAPENTIN 100 MG/1
100 CAPSULE ORAL EVERY 8 HOURS SCHEDULED
Status: DISCONTINUED | OUTPATIENT
Start: 2017-10-13 | End: 2017-10-13 | Stop reason: HOSPADM

## 2017-10-14 LAB
25(OH)D3+25(OH)D2 SERPL-MCNC: 40.3 NG/ML (ref 30–100)
ACTH PLAS-MCNC: 20.9 PG/ML (ref 7.2–63.3)
ALBUMIN SERPL-MCNC: 4.7 G/DL (ref 3.5–5.2)
ALBUMIN/GLOB SERPL: 1.7 G/DL
ALP SERPL-CCNC: 48 U/L (ref 39–117)
ALT SERPL-CCNC: 26 U/L (ref 1–41)
AST SERPL-CCNC: 16 U/L (ref 1–40)
BILIRUB SERPL-MCNC: 0.7 MG/DL (ref 0.1–1.2)
BUN SERPL-MCNC: 13 MG/DL (ref 6–20)
BUN/CREAT SERPL: 12.7 (ref 7–25)
CALCIUM SERPL-MCNC: 9.7 MG/DL (ref 8.6–10.5)
CHLORIDE SERPL-SCNC: 103 MMOL/L (ref 98–107)
CHOLEST SERPL-MCNC: 164 MG/DL (ref 0–200)
CO2 SERPL-SCNC: 26.4 MMOL/L (ref 22–29)
CORTIS SERPL-MCNC: 6.1 UG/DL
CREAT SERPL-MCNC: 1.02 MG/DL (ref 0.76–1.27)
FSH SERPL-ACNC: 4.8 MIU/ML (ref 1.5–12.4)
GLOBULIN SER CALC-MCNC: 2.8 GM/DL
GLUCOSE SERPL-MCNC: 92 MG/DL (ref 65–99)
HDLC SERPL-MCNC: 38 MG/DL (ref 40–60)
LDLC SERPL CALC-MCNC: 105 MG/DL (ref 0–100)
LH SERPL-ACNC: 9 MIU/ML (ref 1.7–8.6)
POTASSIUM SERPL-SCNC: 4.7 MMOL/L (ref 3.5–5.2)
PROLACTIN SERPL-MCNC: 12.3 NG/ML (ref 4–15.2)
PROT SERPL-MCNC: 7.5 G/DL (ref 6–8.5)
PSA SERPL-MCNC: 2.93 NG/ML (ref 0–4)
SHBG SERPL-SCNC: 26.1 NMOL/L (ref 16.5–55.9)
SODIUM SERPL-SCNC: 143 MMOL/L (ref 136–145)
T3FREE SERPL-MCNC: 3.8 PG/ML (ref 2–4.4)
T4 FREE SERPL-MCNC: 1.02 NG/DL (ref 0.93–1.7)
T4 SERPL-MCNC: 6.42 MCG/DL (ref 4.5–11.7)
TESTOST FREE SERPL-MCNC: 8.1 PG/ML (ref 8.7–25.1)
TESTOST SERPL-MCNC: 325 NG/DL (ref 264–916)
THYROGLOB AB SERPL-ACNC: <1 IU/ML (ref 0–0.9)
THYROGLOB SERPL-MCNC: 12 NG/ML (ref 1.4–29.2)
TRIGL SERPL-MCNC: 103 MG/DL (ref 0–150)
TSH SERPL DL<=0.005 MIU/L-ACNC: 5.89 MIU/ML (ref 0.27–4.2)
URATE SERPL-MCNC: 6.2 MG/DL (ref 3.4–7)
VLDLC SERPL CALC-MCNC: 20.6 MG/DL (ref 5–40)

## 2017-10-19 ENCOUNTER — TELEPHONE (OUTPATIENT)
Dept: PSYCHIATRY | Facility: HOSPITAL | Age: 32
End: 2017-10-19

## 2017-10-19 RX ORDER — TESTOSTERONE 16.2 MG/G
GEL TRANSDERMAL
Qty: 150 G | Refills: 5 | Status: SHIPPED | OUTPATIENT
Start: 2017-10-19 | End: 2018-07-20

## 2017-10-19 NOTE — TELEPHONE ENCOUNTER
[facility]    Returned pt phone call regarding blurred vision r/t Effexor. No answer. Unable to leave message.

## 2017-11-03 ENCOUNTER — TELEPHONE (OUTPATIENT)
Dept: PSYCHIATRY | Facility: HOSPITAL | Age: 32
End: 2017-11-03

## 2017-11-03 NOTE — TELEPHONE ENCOUNTER
[facility]    Called pt regarding cancelled appt. Contact information left requesting return call for appt reschedule.

## 2017-11-09 ENCOUNTER — TELEPHONE (OUTPATIENT)
Dept: PSYCHIATRY | Facility: HOSPITAL | Age: 32
End: 2017-11-09

## 2017-11-17 ENCOUNTER — OFFICE VISIT (OUTPATIENT)
Dept: PSYCHIATRY | Facility: HOSPITAL | Age: 32
End: 2017-11-17

## 2017-11-17 DIAGNOSIS — F41.1 GENERALIZED ANXIETY DISORDER: Primary | ICD-10-CM

## 2017-11-17 PROCEDURE — 99214 OFFICE O/P EST MOD 30 MIN: CPT | Performed by: NURSE PRACTITIONER

## 2017-11-17 PROCEDURE — G0463 HOSPITAL OUTPT CLINIC VISIT: HCPCS | Performed by: NURSE PRACTITIONER

## 2017-11-17 NOTE — PROGRESS NOTES
[facility]    Pt presents to clinic for continued evaluation of mood and anxiety sx associated with past lymphoma dx. Pt reports PHQ9=8 and GAD7=8, both significantly reduced since initiation of Effexor XR, with continued endorsement of fatigue, nervousness, and restlessness. Pt reports sleep as being easy to initiate and lasting up to 9 hours, although states he does not feel different if 5 hours or 9. Pt recognizes lethargy as potential SE from SVC sx and hypothyroidism and states this does not prevent him from doing anything. Pt has recently been tx and is being monitored by endocrinology. Discussed possibility of sleep apnea, as pt has BMI 30 with significant weight gain of 40 pounds in last few years. Reports hx of waking self up 'to breath' although not aware of snoring, and without dry mouth. Pt reports general improvement in anxiety and significant improvement in tolerance as compared to Zoloft. However, minor degree of bruxism continues, as does change in ability to focus vision. Discussed importance of meeting with opthalmologist, and patient states this is in the works. Ultimately, pt feel SE are definitely tolerable given noted benefit and desires to maintain this dose at this time. Pt has plans to see neurologist in December for continued maintenance, although states he believes his health is good and change in medication has assisted him in reducing his anxiety regarding threat of recurrence.    Plan  Medications reviewed. Pt reports significant benefit on Effexor, including improved perspective and diminished worry surrounding physical health. Pt desires to remain on Effexor at 37.5 mg, and I agree. Will continue to monitor. Discussed addition of gabapentin for residual sx of anxiety, as initially recommended. Will again discuss the benefit of this with Dr. Georges. Pt instructed on appropriate titration with option to dose towards evening if SE of increased fatigue. Will follow up  with patient in 4-6 weeks.    Total face to face time spent 25 minutes; 15 minutes spent in supportive counseling regarding pt goals of treatment, anxiety reduction, and appropriate health maintenance. Discussed with pt recommendation to see opthamologist regarding vision sx and consider sleep study.

## 2017-11-21 RX ORDER — GABAPENTIN 100 MG/1
100 CAPSULE ORAL NIGHTLY
Qty: 30 CAPSULE | Refills: 2 | Status: SHIPPED | OUTPATIENT
Start: 2017-11-21 | End: 2018-07-20

## 2017-12-15 ENCOUNTER — HOSPITAL ENCOUNTER (EMERGENCY)
Facility: HOSPITAL | Age: 32
Discharge: HOME | End: 2017-12-15
Attending: EMERGENCY MEDICINE | Admitting: EMERGENCY MEDICINE

## 2017-12-15 VITALS
TEMPERATURE: 98 F | HEIGHT: 71 IN | DIASTOLIC BLOOD PRESSURE: 87 MMHG | HEART RATE: 82 BPM | BODY MASS INDEX: 30.1 KG/M2 | OXYGEN SATURATION: 98 % | WEIGHT: 215 LBS | SYSTOLIC BLOOD PRESSURE: 135 MMHG | RESPIRATION RATE: 18 BRPM

## 2017-12-15 DIAGNOSIS — K92.2 UGI BLEED: Primary | ICD-10-CM

## 2017-12-15 DIAGNOSIS — K29.01 ACUTE GASTRITIS WITH HEMORRHAGE, UNSPECIFIED GASTRITIS TYPE: ICD-10-CM

## 2017-12-15 LAB
ALBUMIN SERPL-MCNC: 4.5 G/DL (ref 3.5–5.2)
ALBUMIN/GLOB SERPL: 1.5 G/DL
ALP SERPL-CCNC: 56 U/L (ref 39–117)
ALT SERPL W P-5'-P-CCNC: 24 U/L (ref 1–41)
ANION GAP SERPL CALCULATED.3IONS-SCNC: 11.1 MMOL/L
AST SERPL-CCNC: 22 U/L (ref 1–40)
BASOPHILS # BLD AUTO: 0.02 10*3/MM3 (ref 0–0.2)
BASOPHILS NFR BLD AUTO: 0.3 % (ref 0–1.5)
BILIRUB SERPL-MCNC: 0.4 MG/DL (ref 0.1–1.2)
BUN BLD-MCNC: 15 MG/DL (ref 6–20)
BUN/CREAT SERPL: 15.2 (ref 7–25)
CALCIUM SPEC-SCNC: 9 MG/DL (ref 8.6–10.5)
CHLORIDE SERPL-SCNC: 103 MMOL/L (ref 98–107)
CO2 SERPL-SCNC: 28.9 MMOL/L (ref 22–29)
CREAT BLD-MCNC: 0.99 MG/DL (ref 0.76–1.27)
DEPRECATED RDW RBC AUTO: 41.7 FL (ref 37–54)
EOSINOPHIL # BLD AUTO: 0.05 10*3/MM3 (ref 0–0.7)
EOSINOPHIL NFR BLD AUTO: 0.8 % (ref 0.3–6.2)
ERYTHROCYTE [DISTWIDTH] IN BLOOD BY AUTOMATED COUNT: 13.2 % (ref 11.5–14.5)
GFR SERPL CREATININE-BSD FRML MDRD: 88 ML/MIN/1.73
GLOBULIN UR ELPH-MCNC: 3 GM/DL
GLUCOSE BLD-MCNC: 126 MG/DL (ref 65–99)
HCT VFR BLD AUTO: 43.7 % (ref 40.4–52.2)
HGB BLD-MCNC: 15 G/DL (ref 13.7–17.6)
HOLD SPECIMEN: NORMAL
HOLD SPECIMEN: NORMAL
IMM GRANULOCYTES # BLD: 0.05 10*3/MM3 (ref 0–0.03)
IMM GRANULOCYTES NFR BLD: 0.8 % (ref 0–0.5)
LIPASE SERPL-CCNC: 21 U/L (ref 13–60)
LYMPHOCYTES # BLD AUTO: 0.68 10*3/MM3 (ref 0.9–4.8)
LYMPHOCYTES NFR BLD AUTO: 10.4 % (ref 19.6–45.3)
MCH RBC QN AUTO: 29.6 PG (ref 27–32.7)
MCHC RBC AUTO-ENTMCNC: 34.3 G/DL (ref 32.6–36.4)
MCV RBC AUTO: 86.2 FL (ref 79.8–96.2)
MONOCYTES # BLD AUTO: 0.31 10*3/MM3 (ref 0.2–1.2)
MONOCYTES NFR BLD AUTO: 4.7 % (ref 5–12)
NEUTROPHILS # BLD AUTO: 5.44 10*3/MM3 (ref 1.9–8.1)
NEUTROPHILS NFR BLD AUTO: 83 % (ref 42.7–76)
PLATELET # BLD AUTO: 160 10*3/MM3 (ref 140–500)
PMV BLD AUTO: 10.3 FL (ref 6–12)
POTASSIUM BLD-SCNC: 4.2 MMOL/L (ref 3.5–5.2)
PROT SERPL-MCNC: 7.5 G/DL (ref 6–8.5)
RBC # BLD AUTO: 5.07 10*6/MM3 (ref 4.6–6)
SODIUM BLD-SCNC: 143 MMOL/L (ref 136–145)
WBC NRBC COR # BLD: 6.55 10*3/MM3 (ref 4.5–10.7)
WHOLE BLOOD HOLD SPECIMEN: NORMAL
WHOLE BLOOD HOLD SPECIMEN: NORMAL

## 2017-12-15 PROCEDURE — 96374 THER/PROPH/DIAG INJ IV PUSH: CPT

## 2017-12-15 PROCEDURE — 96375 TX/PRO/DX INJ NEW DRUG ADDON: CPT

## 2017-12-15 PROCEDURE — 25010000002 ONDANSETRON PER 1 MG: Performed by: EMERGENCY MEDICINE

## 2017-12-15 PROCEDURE — 85025 COMPLETE CBC W/AUTO DIFF WBC: CPT | Performed by: EMERGENCY MEDICINE

## 2017-12-15 PROCEDURE — 96361 HYDRATE IV INFUSION ADD-ON: CPT

## 2017-12-15 PROCEDURE — 83690 ASSAY OF LIPASE: CPT | Performed by: EMERGENCY MEDICINE

## 2017-12-15 PROCEDURE — 80053 COMPREHEN METABOLIC PANEL: CPT | Performed by: EMERGENCY MEDICINE

## 2017-12-15 PROCEDURE — 99283 EMERGENCY DEPT VISIT LOW MDM: CPT

## 2017-12-15 RX ORDER — FAMOTIDINE 10 MG/ML
20 INJECTION, SOLUTION INTRAVENOUS ONCE
Status: COMPLETED | OUTPATIENT
Start: 2017-12-15 | End: 2017-12-15

## 2017-12-15 RX ORDER — OMEPRAZOLE 20 MG/1
20 CAPSULE, DELAYED RELEASE ORAL DAILY
Qty: 30 CAPSULE | Refills: 0 | Status: SHIPPED | OUTPATIENT
Start: 2017-12-15 | End: 2018-10-23

## 2017-12-15 RX ORDER — ONDANSETRON 2 MG/ML
4 INJECTION INTRAMUSCULAR; INTRAVENOUS ONCE
Status: COMPLETED | OUTPATIENT
Start: 2017-12-15 | End: 2017-12-15

## 2017-12-15 RX ADMIN — ONDANSETRON 4 MG: 2 INJECTION INTRAMUSCULAR; INTRAVENOUS at 16:24

## 2017-12-15 RX ADMIN — FAMOTIDINE 20 MG: 10 INJECTION, SOLUTION INTRAVENOUS at 16:21

## 2017-12-15 RX ADMIN — SODIUM CHLORIDE 1000 ML: 9 INJECTION, SOLUTION INTRAVENOUS at 16:20

## 2017-12-15 NOTE — ED NOTES
"Pt reports \"i drank too much last night for a benefit and I really just feel hungover.\" pt reports 1 episode of \"alot\" of dark red blood today. He denies diarrhea. He repots hx of GERD. Reassurance given; call light in reach. Pts breathing even and unlabored. Pt appears in NAD at this time.     Mabel Spaulding RN  12/15/17 0082    "

## 2017-12-15 NOTE — ED PROVIDER NOTES
EMERGENCY DEPARTMENT ENCOUNTER    CHIEF COMPLAINT  Chief Complaint: hematemesis   History given by: patient  History limited by: nothing   Room Number: 21/21  PMD: Melina Kothari MD      HPI:  Pt is a 32 y.o. male who presents complaining of an episode of hematemesis that occurred 2 hours ago. Pt states the vomit was not bloody at first but then became completely maroon in color. He now complains of mild abdominal discomfort but has no other complaints at this time. He denies fever, chest pain, SOA, or any other sx at this time.Pt states he consumed a large number of beers last night. He has never had abdominal surgeries. He has not taken NSAIDs recently.     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   • Diffuse large B cell lymphoma 02/26/2017   • Balance problem 08/21/2017   • Neuropathy 08/21/2017   • Blurry vision, bilateral 09/11/2017   • Generalized anxiety disorder 09/11/2017   • Seasonal allergic rhinitis 09/14/2017   • Primary hypothyroidism 10/12/2017   • Cold intolerance 10/12/2017   • Chronic fatigue 10/12/2017   • Primary insomnia 10/13/2017     Resolved Ambulatory Problems     Diagnosis Date Noted   • No Resolved Ambulatory Problems     Past Medical History:   Diagnosis Date   • Anxiety    • GERD (gastroesophageal reflux disease)    • Hypothyroidism    • Lymphoma    • Superior vena cava syndrome    • TIA (transient ischemic attack)        PAST SURGICAL HISTORY  Past Surgical History:   Procedure Laterality Date   • BRACHIOCEPHALIC VEIN ANGIOPLASTY / STENTING Bilateral     no stents were able to be placed   • CHEST TUBE INSERTION Right     after  thoracentesis   • EXCISION MASS HEAD/NECK Left 2/27/2017    Procedure: Excisional biopsy of left occipital lymph node;  Surgeon: Catalino Damon MD;  Location: [facility] OR;  Service:    • RIB BIOPSY Right 2011    MEDIASTINUM   • SKIN BIOPSY      cyst on left shoulder, benign   • THORACENTESIS Right     thora drain left in for 6 months       FAMILY HISTORY  Family History   Problem Relation Age of Onset   • Cancer Maternal Grandmother    • Cancer Maternal Grandfather    • Cancer Paternal Grandmother    • Diabetes Paternal Grandmother    • Cancer Paternal Grandfather        SOCIAL HISTORY  Social History     Social History   • Marital status: Single     Spouse name: N/A   • Number of children: N/A   • Years of education: College     Occupational History   • 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  Works as a Physical Therapist.         ALLERGIES  Zoloft [sertraline hcl]    REVIEW OF SYSTEMS  Review of Systems   Constitutional: Negative for fever.   Respiratory: Negative for shortness of breath.    Cardiovascular: Negative for chest pain.   Gastrointestinal: Positive for abdominal pain (mild discomfort), nausea and vomiting (blood).       PHYSICAL EXAM  ED Triage Vitals   Temp Heart Rate Resp BP SpO2   12/15/17 1447 12/15/17 1447 12/15/17 1447 12/15/17 1454 12/15/17 1447   98.1 °F (36.7 °C) 92 16 129/85 98 %      Temp src Heart Rate Source Patient Position BP Location FiO2 (%)   12/15/17 1447 12/15/17 1537 -- -- --   Tympanic Monitor          Physical Exam   Constitutional: He is oriented to person, place, and time and well-developed, well-nourished, and in no distress.   HENT:    Head: Normocephalic and atraumatic.   Eyes: EOM are normal. Pupils are equal, round, and reactive to light.   Neck: Normal range of motion. Neck supple.   Cardiovascular: Normal rate, regular rhythm and normal heart sounds.    Pulmonary/Chest: Effort normal and breath sounds normal. No respiratory distress.   Abdominal: Soft. There is no tenderness.   Musculoskeletal: Normal range of motion. He exhibits no edema.   Neurological: He is alert and oriented to person, place, and time. He has normal sensation and normal strength.   Skin: Skin is warm and dry.   Psychiatric: Mood and affect normal.   Nursing note and vitals reviewed.      LAB RESULTS  Lab Results (last 24 hours)     Procedure Component Value Units Date/Time    CBC & Differential [[id number]] Collected:  12/15/17 1532    Specimen:  Blood Updated:  12/15/17 1725    Narrative:       The following orders were created for panel order CBC & Differential.  Procedure                               Abnormality         Status                     ---------                               -----------         ------                     CBC Auto Differential[[id number]]        Abnormal            Final result                 Please view results for these tests on the individual orders.    Comprehensive Metabolic Panel [[id number]]  (Abnormal) Collected:  12/15/17 1532    Specimen:  Blood Updated:  12/15/17 1737     Glucose 126 (H) mg/dL      BUN 15 mg/dL      Creatinine 0.99 mg/dL      Sodium 143 mmol/L      Potassium 4.2 mmol/L      Chloride 103 mmol/L      CO2 28.9 mmol/L      Calcium 9.0 mg/dL      Total Protein 7.5 g/dL      Albumin 4.50 g/dL      ALT (SGPT) 24 U/L      AST (SGOT) 22 U/L      Alkaline Phosphatase 56 U/L      Total Bilirubin 0.4 mg/dL      eGFR Non African Amer 88 mL/min/1.73      Globulin 3.0 gm/dL      A/G Ratio 1.5 g/dL      BUN/Creatinine Ratio 15.2     Anion Gap 11.1 mmol/L     Lipase [[id number]]  (Normal) Collected:  12/15/17 1532    Specimen:   Blood Updated:  12/15/17 1737     Lipase 21 U/L     CBC Auto Differential [[id number]]  (Abnormal) Collected:  12/15/17 1532    Specimen:  Blood Updated:  12/15/17 1725     WBC 6.55 10*3/mm3      RBC 5.07 10*6/mm3      Hemoglobin 15.0 g/dL      Hematocrit 43.7 %      MCV 86.2 fL      MCH 29.6 pg      MCHC 34.3 g/dL      RDW 13.2 %      RDW-SD 41.7 fl      MPV 10.3 fL      Platelets 160 10*3/mm3      Neutrophil % 83.0 (H) %      Lymphocyte % 10.4 (L) %      Monocyte % 4.7 (L) %      Eosinophil % 0.8 %      Basophil % 0.3 %      Immature Grans % 0.8 (H) %      Neutrophils, Absolute 5.44 10*3/mm3      Lymphocytes, Absolute 0.68 (L) 10*3/mm3      Monocytes, Absolute 0.31 10*3/mm3      Eosinophils, Absolute 0.05 10*3/mm3      Basophils, Absolute 0.02 10*3/mm3      Immature Grans, Absolute 0.05 (H) 10*3/mm3           I ordered the above labs and reviewed the results      PROCEDURES  Procedures      PROGRESS AND CONSULTS  ED Course     1614: Ordered 20 mg Pepcid, Zofran, and 1,000 cc fluid bolus to treat sx.     1651: Ordered labs for further evaluation.     1700: Rechecked pt. Pt feels much better.     MEDICAL DECISION MAKING  Results were reviewed/discussed with the patient and they were also made aware of online access. Pt also made aware that some labs, such as cultures, will not be resulted during ER visit and follow up with PMD is necessary.     MDM  Number of Diagnoses or Management Options     Amount and/or Complexity of Data Reviewed  Clinical lab tests: ordered and reviewed           DIAGNOSIS  Final diagnoses:   UGI bleed   Acute gastritis with hemorrhage, unspecified gastritis type       DISPOSITION  DISCHARGE    Patient discharged in stable condition.    Reviewed implications of results, diagnosis, meds, responsibility to follow up, warning signs and symptoms of possible worsening, potential complications and reasons to return to ER.    Patient/Family voiced understanding of above instructions.    Discussed  plan for discharge, as there is no emergent indication for admission.  Pt/family is agreeable and understands need for follow up and repeat testing.  Pt is aware that discharge does not mean that nothing is wrong but it indicates no emergency is present that requires admission and they must continue care with follow-up as given below or physician of their choice.     FOLLOW-UP  Melina Kothari MD  [address] [place] [facility] [place] [address] [phone]          Pipe Centeno MD  [address] [facility] [address]  [facility] [address]  [phone]    In 1 week  If Not Better, Call for Appointment         Medication List      Stop          naproxen sodium 220 MG tablet   Commonly known as:  ALEVE       predniSONE 20 MG tablet   Commonly known as:  DELTASONE           Latest Documented Vital Signs:  As of 5:45 PM  BP- 137/81 HR- 85 Temp- 98.1 °F (36.7 °C) (Tympanic) O2 sat- 98%    --  Documentation assistance provided by kirsty Sepulveda for Harry Nicole.  Information recorded by the scribe was done at my direction and has been verified and validated by me.       Peyton Sepulveda  12/15/17 5759       Harry Nicole MD  12/15/17 1871

## 2017-12-20 ENCOUNTER — TELEPHONE (OUTPATIENT)
Dept: SOCIAL WORK | Facility: HOSPITAL | Age: 32
End: 2017-12-20

## 2017-12-27 DIAGNOSIS — E29.1 MALE HYPOGONADISM: ICD-10-CM

## 2017-12-27 DIAGNOSIS — E03.9 PRIMARY HYPOTHYROIDISM: Primary | ICD-10-CM

## 2017-12-27 DIAGNOSIS — R53.82 CHRONIC FATIGUE: ICD-10-CM

## 2018-01-23 ENCOUNTER — TELEPHONE (OUTPATIENT)
Dept: PSYCHIATRY | Facility: HOSPITAL | Age: 33
End: 2018-01-23

## 2018-01-23 NOTE — TELEPHONE ENCOUNTER
[facility]cology Services    Called pt regarding pt message requesting assistance. Message left with contact info and request for return call.

## 2018-01-27 ENCOUNTER — RESULTS ENCOUNTER (OUTPATIENT)
Dept: ENDOCRINOLOGY | Age: 33
End: 2018-01-27

## 2018-01-27 DIAGNOSIS — R53.82 CHRONIC FATIGUE: ICD-10-CM

## 2018-01-27 DIAGNOSIS — E29.1 MALE HYPOGONADISM: ICD-10-CM

## 2018-01-27 DIAGNOSIS — E03.9 PRIMARY HYPOTHYROIDISM: ICD-10-CM

## 2018-01-30 ENCOUNTER — TELEPHONE (OUTPATIENT)
Dept: PSYCHIATRY | Facility: HOSPITAL | Age: 33
End: 2018-01-30

## 2018-01-30 NOTE — TELEPHONE ENCOUNTER
Supportive Oncology Services    Returned pt phone call requesting FU appt. Message left with several appt options, contact info, and request for return call.

## 2018-02-18 ENCOUNTER — APPOINTMENT (OUTPATIENT)
Dept: CT IMAGING | Facility: HOSPITAL | Age: 33
End: 2018-02-18

## 2018-02-18 ENCOUNTER — HOSPITAL ENCOUNTER (EMERGENCY)
Facility: HOSPITAL | Age: 33
Discharge: HOME | End: 2018-02-18
Attending: EMERGENCY MEDICINE | Admitting: EMERGENCY MEDICINE

## 2018-02-18 VITALS
WEIGHT: 225 LBS | DIASTOLIC BLOOD PRESSURE: 75 MMHG | BODY MASS INDEX: 31.5 KG/M2 | RESPIRATION RATE: 18 BRPM | OXYGEN SATURATION: 99 % | TEMPERATURE: 98.1 F | HEART RATE: 82 BPM | HEIGHT: 71 IN | SYSTOLIC BLOOD PRESSURE: 129 MMHG

## 2018-02-18 DIAGNOSIS — R20.2 PARESTHESIA: Primary | ICD-10-CM

## 2018-02-18 LAB
ALBUMIN SERPL-MCNC: 4.5 G/DL (ref 3.5–5.2)
ALBUMIN/GLOB SERPL: 1.6 G/DL
ALP SERPL-CCNC: 46 U/L (ref 39–117)
ALT SERPL W P-5'-P-CCNC: 21 U/L (ref 1–41)
ANION GAP SERPL CALCULATED.3IONS-SCNC: 13.1 MMOL/L
AST SERPL-CCNC: 15 U/L (ref 1–40)
BASOPHILS # BLD AUTO: 0.04 10*3/MM3 (ref 0–0.2)
BASOPHILS NFR BLD AUTO: 0.9 % (ref 0–1.5)
BILIRUB SERPL-MCNC: 0.5 MG/DL (ref 0.1–1.2)
BUN BLD-MCNC: 15 MG/DL (ref 6–20)
BUN/CREAT SERPL: 13.6 (ref 7–25)
CALCIUM SPEC-SCNC: 9.6 MG/DL (ref 8.6–10.5)
CHLORIDE SERPL-SCNC: 99 MMOL/L (ref 98–107)
CK SERPL-CCNC: 91 U/L (ref 20–200)
CO2 SERPL-SCNC: 26.9 MMOL/L (ref 22–29)
CREAT BLD-MCNC: 1.1 MG/DL (ref 0.76–1.27)
DEPRECATED RDW RBC AUTO: 40.7 FL (ref 37–54)
EOSINOPHIL # BLD AUTO: 0.19 10*3/MM3 (ref 0–0.7)
EOSINOPHIL NFR BLD AUTO: 4.1 % (ref 0.3–6.2)
ERYTHROCYTE [DISTWIDTH] IN BLOOD BY AUTOMATED COUNT: 12.7 % (ref 11.5–14.5)
GFR SERPL CREATININE-BSD FRML MDRD: 78 ML/MIN/1.73
GLOBULIN UR ELPH-MCNC: 2.8 GM/DL
GLUCOSE BLD-MCNC: 93 MG/DL (ref 65–99)
HCT VFR BLD AUTO: 46.5 % (ref 40.4–52.2)
HGB BLD-MCNC: 15.3 G/DL (ref 13.7–17.6)
IMM GRANULOCYTES # BLD: 0 10*3/MM3 (ref 0–0.03)
IMM GRANULOCYTES NFR BLD: 0 % (ref 0–0.5)
LYMPHOCYTES # BLD AUTO: 1.2 10*3/MM3 (ref 0.9–4.8)
LYMPHOCYTES NFR BLD AUTO: 25.6 % (ref 19.6–45.3)
MCH RBC QN AUTO: 28.7 PG (ref 27–32.7)
MCHC RBC AUTO-ENTMCNC: 32.9 G/DL (ref 32.6–36.4)
MCV RBC AUTO: 87.1 FL (ref 79.8–96.2)
MONOCYTES # BLD AUTO: 0.27 10*3/MM3 (ref 0.2–1.2)
MONOCYTES NFR BLD AUTO: 5.8 % (ref 5–12)
NEUTROPHILS # BLD AUTO: 2.98 10*3/MM3 (ref 1.9–8.1)
NEUTROPHILS NFR BLD AUTO: 63.6 % (ref 42.7–76)
PLATELET # BLD AUTO: 156 10*3/MM3 (ref 140–500)
PMV BLD AUTO: 10 FL (ref 6–12)
POTASSIUM BLD-SCNC: 4.1 MMOL/L (ref 3.5–5.2)
PROT SERPL-MCNC: 7.3 G/DL (ref 6–8.5)
RBC # BLD AUTO: 5.34 10*6/MM3 (ref 4.6–6)
SODIUM BLD-SCNC: 139 MMOL/L (ref 136–145)
WBC NRBC COR # BLD: 4.68 10*3/MM3 (ref 4.5–10.7)

## 2018-02-18 PROCEDURE — 36415 COLL VENOUS BLD VENIPUNCTURE: CPT | Performed by: PHYSICIAN ASSISTANT

## 2018-02-18 PROCEDURE — 85025 COMPLETE CBC W/AUTO DIFF WBC: CPT | Performed by: PHYSICIAN ASSISTANT

## 2018-02-18 PROCEDURE — 82550 ASSAY OF CK (CPK): CPT | Performed by: EMERGENCY MEDICINE

## 2018-02-18 PROCEDURE — 80053 COMPREHEN METABOLIC PANEL: CPT | Performed by: PHYSICIAN ASSISTANT

## 2018-02-18 PROCEDURE — 70450 CT HEAD/BRAIN W/O DYE: CPT

## 2018-02-18 PROCEDURE — 99283 EMERGENCY DEPT VISIT LOW MDM: CPT

## 2018-02-18 NOTE — ED TRIAGE NOTES
Patient presents to ER with left sided facial numbness.  Reports bilateral medial knee numbness.  Reports chronic dizziness.  Has appointment with neurologist in July.

## 2018-02-18 NOTE — ED PROVIDER NOTES
EMERGENCY DEPARTMENT ENCOUNTER    CHIEF COMPLAINT  Chief Complaint: Numbness  History given by: Pt  History limited by: None  Room Number: 44/44  PMD: Melina Kothari MD      HPI:  Pt is a 32 y.o. male with a hx of lymphoma who presents complaining of L sided facial numbness and and intermittent bilateral leg numbness. Pt denies weakness in legs but states myalgias. Pt denies N/V/D and cough. Pt also reports chronic fatigue for about 8 months and tingling in UE that lasts a few seconds. Pt states he has apt with neuro in July.    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   • Diffuse large B cell lymphoma 02/26/2017   • Balance problem 08/21/2017   • Neuropathy 08/21/2017   • Blurry vision, bilateral 09/11/2017   • Generalized anxiety disorder 09/11/2017   • Seasonal allergic rhinitis 09/14/2017   • Primary hypothyroidism 10/12/2017   • Cold intolerance 10/12/2017   • Chronic fatigue 10/12/2017   • Primary insomnia 10/13/2017     Resolved Ambulatory Problems     Diagnosis Date Noted   • No Resolved Ambulatory Problems     Past Medical History:   Diagnosis Date   • Anxiety    • GERD (gastroesophageal reflux disease)    • Hypothyroidism    • Lymphoma    • Superior vena cava syndrome    • TIA (transient ischemic attack)        PAST SURGICAL HISTORY  Past Surgical History:   Procedure Laterality Date   • BRACHIOCEPHALIC VEIN ANGIOPLASTY / STENTING Bilateral     no stents were able to be placed   • CHEST TUBE INSERTION Right     after thoracentesis   • EXCISION MASS HEAD/NECK Left 2/27/2017    Procedure: Excisional biopsy of left occipital  lymph node;  Surgeon: Catalino Damon MD;  Location: [facility] OR;  Service:    • RIB BIOPSY Right 2011    MEDIASTINUM   • SKIN BIOPSY      cyst on left shoulder, benign   • THORACENTESIS Right     thora drain left in for 6 months       FAMILY HISTORY  Family History   Problem Relation Age of Onset   • Cancer Maternal Grandmother    • Cancer Maternal Grandfather    • Cancer Paternal Grandmother    • Diabetes Paternal Grandmother    • Cancer Paternal Grandfather        SOCIAL HISTORY  Social History     Social History   • Marital status: Single     Spouse name: N/A   • Number of children: N/A   • Years of education: College     Occupational History   • Physical therapy tech Other [employer]     Social History Main Topics   • Smoking status: Never Smoker   • Smokeless tobacco: Never Used   • Alcohol use Yes      Comment: occasional, 1-2 drinks per week   • Drug use: No   • Sexual activity: Yes     Partners: Male     Other Topics Concern   • Not on file     Social History Narrative    Lives at home with his dog.  Works as a Physical Therapist.         ALLERGIES  Zoloft [sertraline hcl]    REVIEW OF SYSTEMS  Review of Systems   Constitutional: Positive for fatigue (chronic). Negative for activity change, appetite change and fever.   HENT: Negative for congestion and sore throat.    Eyes: Negative.    Respiratory: Negative for cough and shortness of breath.    Cardiovascular: Negative for chest pain and leg swelling.   Gastrointestinal: Negative for abdominal pain, diarrhea and vomiting.   Endocrine: Negative.    Genitourinary: Negative for decreased urine volume and dysuria.   Musculoskeletal: Positive for myalgias. Negative for neck pain.   Skin: Negative for rash and wound.   Allergic/Immunologic: Negative.    Neurological: Positive for numbness (L side facial, intermittent R leg). Negative for weakness and headaches. Tremors: UE.   Hematological: Negative.    Psychiatric/Behavioral: Negative.    All  other systems reviewed and are negative.      PHYSICAL EXAM  ED Triage Vitals   Temp Heart Rate Resp BP SpO2   02/18/18 1428 02/18/18 1428 02/18/18 1428 02/18/18 1432 02/18/18 1428   98.1 °F (36.7 °C) 94 16 141/87 96 %      Temp src Heart Rate Source Patient Position BP Location FiO2 (%)   02/18/18 1428 -- -- -- --   Tympanic           Physical Exam   Constitutional: He is oriented to person, place, and time and well-developed, well-nourished, and in no distress.   HENT:   Head: Normocephalic and atraumatic.   Eyes: EOM are normal. Pupils are equal, round, and reactive to light. Right eye exhibits no nystagmus. Left eye exhibits no nystagmus.   Neck: Normal range of motion. Neck supple.   Cardiovascular: Normal rate, regular rhythm and normal heart sounds.    Pulmonary/Chest: Effort normal and breath sounds normal. No respiratory distress.   Abdominal: Soft. There is no tenderness. There is no rebound and no guarding.   Musculoskeletal: Normal range of motion. He exhibits no edema.   Neurological: He is alert and oriented to person, place, and time. He has normal sensation and normal strength.   Normal sensations   Skin: Skin is warm and dry.   Psychiatric: Mood and affect normal.   Nursing note and vitals reviewed.      LAB RESULTS  Lab Results (last 24 hours)     Procedure Component Value Units Date/Time    CBC & Differential [[id number]] Collected:  02/18/18 1509    Specimen:  Blood Updated:  02/18/18 1526    Narrative:       The following orders were created for panel order CBC & Differential.  Procedure                               Abnormality         Status                     ---------                               -----------         ------                     CBC Auto Differential[[id number]]        Normal              Final result                 Please view results for these tests on the individual orders.    Comprehensive Metabolic Panel [[id number]] Collected:  02/18/18 1509    Specimen:  Blood Updated:   02/18/18 1545     Glucose 93 mg/dL      BUN 15 mg/dL      Creatinine 1.10 mg/dL      Sodium 139 mmol/L      Potassium 4.1 mmol/L      Chloride 99 mmol/L      CO2 26.9 mmol/L      Calcium 9.6 mg/dL      Total Protein 7.3 g/dL      Albumin 4.50 g/dL      ALT (SGPT) 21 U/L      AST (SGOT) 15 U/L      Alkaline Phosphatase 46 U/L      Total Bilirubin 0.5 mg/dL      eGFR Non African Amer 78 mL/min/1.73      Globulin 2.8 gm/dL      A/G Ratio 1.6 g/dL      BUN/Creatinine Ratio 13.6     Anion Gap 13.1 mmol/L     CBC Auto Differential [[id number]]  (Normal) Collected:  02/18/18 1509    Specimen:  Blood Updated:  02/18/18 1526     WBC 4.68 10*3/mm3      RBC 5.34 10*6/mm3      Hemoglobin 15.3 g/dL      Hematocrit 46.5 %      MCV 87.1 fL      MCH 28.7 pg      MCHC 32.9 g/dL      RDW 12.7 %      RDW-SD 40.7 fl      MPV 10.0 fL      Platelets 156 10*3/mm3      Neutrophil % 63.6 %      Lymphocyte % 25.6 %      Monocyte % 5.8 %      Eosinophil % 4.1 %      Basophil % 0.9 %      Immature Grans % 0.0 %      Neutrophils, Absolute 2.98 10*3/mm3      Lymphocytes, Absolute 1.20 10*3/mm3      Monocytes, Absolute 0.27 10*3/mm3      Eosinophils, Absolute 0.19 10*3/mm3      Basophils, Absolute 0.04 10*3/mm3      Immature Grans, Absolute 0.00 10*3/mm3     CK [[id number]]  (Normal) Collected:  02/18/18 1509    Specimen:  Blood Updated:  02/18/18 1646     Creatine Kinase 91 U/L           I ordered the above labs and reviewed the results    RADIOLOGY  CT Head Without Contrast   Final Result   1. Normal.       This report was finalized on 2/18/2018 5:04 PM by Dr. Praaksh Hernandez MD.               I ordered the above noted radiological studies. Interpreted by radiologist. Reviewed by me in PACS.       PROCEDURES  Procedures      PROGRESS AND CONSULTS  ED Course   1618  Labs and CT Head ordered    1537  Rechecked pt BP- 134/83 HR- 82 Temp- 98.1 °F (36.7 °C) (Tympanic) O2 sat- 98%   Discussed results of normal labs and CT. Plan to discharge pt and  informed pt to f/u with PCP. Pt understands and agrees to plan.      MEDICAL DECISION MAKING  Results were reviewed/discussed with the patient and they were also made aware of online access. Pt also made aware that some labs, such as cultures, will not be resulted during ER visit and follow up with PMD is necessary.     MDM  Number of Diagnoses or Management Options  Paresthesia:   Diagnosis management comments: Patient had a normal workup.  Neuro exam was also normal.  Patient will be discharged and advised follow-up with his primary care doctor.       Amount and/or Complexity of Data Reviewed  Clinical lab tests: reviewed and ordered  Tests in the radiology section of CPT®: ordered and reviewed (CT Head-normal)    Patient Progress  Patient progress: stable         DIAGNOSIS  Final diagnoses:   Paresthesia       DISPOSITION  DISCHARGE    Patient discharged in stable condition.    Reviewed implications of results, diagnosis, meds, responsibility to follow up, warning signs and symptoms of possible worsening, potential complications and reasons to return to ER.    Patient/Family voiced understanding of above instructions.    Discussed plan for discharge, as there is no emergent indication for admission.  Pt/family is agreeable and understands need for follow up and repeat testing.  Pt is aware that discharge does not mean that nothing is wrong but it indicates no emergency is present that requires admission and they must continue care with follow-up as given below or physician of their choice.     FOLLOW-UP  Melina Kothari MD  [address] [facility] [address] [phone]    Call in 2 days           Medication List      Notice     No changes were made to your prescriptions during this visit.            Latest Documented Vital Signs:  As of 5:43 PM  BP- 129/75 HR- 82 Temp- 98.1 °F (36.7 °C) (Tympanic) O2 sat- 99%    --  Documentation assistance provided by kirsty Esparza for Dr. Kamara.   Information recorded by the scribe was done at my direction and has been verified and validated by me.           Garo Esparza  02/18/18 1744       Robinson Kamara MD  02/18/18 174

## 2018-02-18 NOTE — ED NOTES
"Pt reports intermittent dizziness X1 year. Numbness/ \"clogged feeling\" to left ear X1mo. Intermittent bilaterally knee numbness most recent episode starting today.      Cheryl Santamaria RN  02/18/18 [id number]    "

## 2018-02-18 NOTE — DISCHARGE INSTRUCTIONS
Follow-up with your primary care doctor later this week.  Return to emergency department for focal weakness, worsening symptoms, or other concern.

## 2018-02-20 ENCOUNTER — TELEPHONE (OUTPATIENT)
Dept: FAMILY MEDICINE CLINIC | Facility: CLINIC | Age: 33
End: 2018-02-20

## 2018-02-20 NOTE — TELEPHONE ENCOUNTER
It sounds like he needs to call his Neurologist and find out what sort of imaging or other testing they would recommend.

## 2018-02-20 NOTE — TELEPHONE ENCOUNTER
Patient left a VM stating that he went to the ER on 2/18/18 for numbness around his left ear and they did a CT of the head. He would like to know if you would advise getting a MRI as well?

## 2018-02-22 NOTE — TELEPHONE ENCOUNTER
Left VM to let patient know that we can order an MRI, but he really needs to be seen by his Neurologist as they might want to order something different.

## 2018-05-31 ENCOUNTER — APPOINTMENT (OUTPATIENT)
Dept: GENERAL RADIOLOGY | Facility: HOSPITAL | Age: 33
End: 2018-05-31

## 2018-05-31 ENCOUNTER — HOSPITAL ENCOUNTER (EMERGENCY)
Facility: HOSPITAL | Age: 33
Discharge: HOME | End: 2018-05-31
Attending: EMERGENCY MEDICINE | Admitting: EMERGENCY MEDICINE

## 2018-05-31 ENCOUNTER — APPOINTMENT (OUTPATIENT)
Dept: CT IMAGING | Facility: HOSPITAL | Age: 33
End: 2018-05-31

## 2018-05-31 VITALS
SYSTOLIC BLOOD PRESSURE: 114 MMHG | HEIGHT: 71 IN | DIASTOLIC BLOOD PRESSURE: 72 MMHG | WEIGHT: 278 LBS | OXYGEN SATURATION: 96 % | HEART RATE: 95 BPM | BODY MASS INDEX: 38.92 KG/M2 | RESPIRATION RATE: 17 BRPM | TEMPERATURE: 98.9 F

## 2018-05-31 DIAGNOSIS — R07.89 ATYPICAL CHEST PAIN: Primary | ICD-10-CM

## 2018-05-31 LAB
ALBUMIN SERPL-MCNC: 4.2 G/DL (ref 3.5–5)
ALBUMIN/GLOB SERPL: 1.2 G/DL (ref 1–2)
ALP SERPL-CCNC: 84 U/L (ref 38–126)
ALT SERPL W P-5'-P-CCNC: 122 U/L (ref 13–69)
ANION GAP SERPL CALCULATED.3IONS-SCNC: 17.3 MMOL/L (ref 10–20)
AST SERPL-CCNC: 61 U/L (ref 15–46)
BASOPHILS # BLD AUTO: 0.05 10*3/MM3 (ref 0–0.2)
BASOPHILS NFR BLD AUTO: 0.5 % (ref 0–2.5)
BILIRUB SERPL-MCNC: 0.4 MG/DL (ref 0.2–1.3)
BUN BLD-MCNC: 14 MG/DL (ref 7–20)
BUN/CREAT SERPL: 15.6 (ref 6.3–21.9)
CALCIUM SPEC-SCNC: 8.8 MG/DL (ref 8.4–10.2)
CHLORIDE SERPL-SCNC: 104 MMOL/L (ref 98–107)
CO2 SERPL-SCNC: 28 MMOL/L (ref 26–30)
CREAT BLD-MCNC: 0.9 MG/DL (ref 0.6–1.3)
DEPRECATED RDW RBC AUTO: 42.5 FL (ref 37–54)
EOSINOPHIL # BLD AUTO: 0.33 10*3/MM3 (ref 0–0.7)
EOSINOPHIL NFR BLD AUTO: 3.6 % (ref 0–7)
ERYTHROCYTE [DISTWIDTH] IN BLOOD BY AUTOMATED COUNT: 13.9 % (ref 11.5–14.5)
GFR SERPL CREATININE-BSD FRML MDRD: 97 ML/MIN/1.73
GLOBULIN UR ELPH-MCNC: 3.4 GM/DL
GLUCOSE BLD-MCNC: 115 MG/DL (ref 74–98)
HCT VFR BLD AUTO: 39.3 % (ref 42–52)
HGB BLD-MCNC: 13.1 G/DL (ref 14–18)
HOLD SPECIMEN: NORMAL
HOLD SPECIMEN: NORMAL
IMM GRANULOCYTES # BLD: 0.16 10*3/MM3 (ref 0–0.06)
IMM GRANULOCYTES NFR BLD: 1.7 % (ref 0–0.6)
LYMPHOCYTES # BLD AUTO: 1.03 10*3/MM3 (ref 0.6–3.4)
LYMPHOCYTES NFR BLD AUTO: 11.2 % (ref 10–50)
MCH RBC QN AUTO: 28.3 PG (ref 27–31)
MCHC RBC AUTO-ENTMCNC: 33.3 G/DL (ref 30–37)
MCV RBC AUTO: 84.9 FL (ref 80–94)
MONOCYTES # BLD AUTO: 0.47 10*3/MM3 (ref 0–0.9)
MONOCYTES NFR BLD AUTO: 5.1 % (ref 0–12)
NEUTROPHILS # BLD AUTO: 7.18 10*3/MM3 (ref 2–6.9)
NEUTROPHILS NFR BLD AUTO: 77.9 % (ref 37–80)
NRBC BLD MANUAL-RTO: 0 /100 WBC (ref 0–0)
PLATELET # BLD AUTO: 350 10*3/MM3 (ref 130–400)
PMV BLD AUTO: 8.5 FL (ref 6–12)
POTASSIUM BLD-SCNC: 4.3 MMOL/L (ref 3.5–5.1)
PROT SERPL-MCNC: 7.6 G/DL (ref 6.3–8.2)
RBC # BLD AUTO: 4.63 10*6/MM3 (ref 4.7–6.1)
SODIUM BLD-SCNC: 145 MMOL/L (ref 137–145)
TROPONIN I SERPL-MCNC: 0 NG/ML (ref 0–0.05)
TROPONIN I SERPL-MCNC: <0.012 NG/ML (ref 0–0.03)
WBC NRBC COR # BLD: 9.22 10*3/MM3 (ref 4.8–10.8)
WHOLE BLOOD HOLD SPECIMEN: NORMAL
WHOLE BLOOD HOLD SPECIMEN: NORMAL

## 2018-05-31 PROCEDURE — 93005 ELECTROCARDIOGRAM TRACING: CPT | Performed by: EMERGENCY MEDICINE

## 2018-05-31 PROCEDURE — 25010000002 MORPHINE PER 10 MG: Performed by: EMERGENCY MEDICINE

## 2018-05-31 PROCEDURE — 80053 COMPREHEN METABOLIC PANEL: CPT | Performed by: EMERGENCY MEDICINE

## 2018-05-31 PROCEDURE — 84484 ASSAY OF TROPONIN QUANT: CPT | Performed by: EMERGENCY MEDICINE

## 2018-05-31 PROCEDURE — 71275 CT ANGIOGRAPHY CHEST: CPT

## 2018-05-31 PROCEDURE — 96374 THER/PROPH/DIAG INJ IV PUSH: CPT

## 2018-05-31 PROCEDURE — 96361 HYDRATE IV INFUSION ADD-ON: CPT

## 2018-05-31 PROCEDURE — 25010000002 IOPAMIDOL 61 % SOLUTION: Performed by: EMERGENCY MEDICINE

## 2018-05-31 PROCEDURE — 99284 EMERGENCY DEPT VISIT MOD MDM: CPT

## 2018-05-31 PROCEDURE — 96376 TX/PRO/DX INJ SAME DRUG ADON: CPT

## 2018-05-31 PROCEDURE — 25010000002 METHYLPREDNISOLONE PER 125 MG: Performed by: EMERGENCY MEDICINE

## 2018-05-31 PROCEDURE — 85025 COMPLETE CBC W/AUTO DIFF WBC: CPT | Performed by: EMERGENCY MEDICINE

## 2018-05-31 PROCEDURE — 25010000002 DIPHENHYDRAMINE PER 50 MG: Performed by: EMERGENCY MEDICINE

## 2018-05-31 PROCEDURE — 96375 TX/PRO/DX INJ NEW DRUG ADDON: CPT

## 2018-05-31 RX ORDER — METHYLPREDNISOLONE SODIUM SUCCINATE 125 MG/2ML
125 INJECTION, POWDER, LYOPHILIZED, FOR SOLUTION INTRAMUSCULAR; INTRAVENOUS ONCE
Status: COMPLETED | OUTPATIENT
Start: 2018-05-31 | End: 2018-05-31

## 2018-05-31 RX ORDER — SODIUM CHLORIDE 0.9 % (FLUSH) 0.9 %
10 SYRINGE (ML) INJECTION AS NEEDED
Status: DISCONTINUED | OUTPATIENT
Start: 2018-05-31 | End: 2018-05-31 | Stop reason: HOSPADM

## 2018-05-31 RX ORDER — MORPHINE SULFATE 2 MG/ML
4 INJECTION, SOLUTION INTRAMUSCULAR; INTRAVENOUS ONCE
Status: COMPLETED | OUTPATIENT
Start: 2018-05-31 | End: 2018-05-31

## 2018-05-31 RX ORDER — ASPIRIN 325 MG
325 TABLET ORAL ONCE
Status: DISCONTINUED | OUTPATIENT
Start: 2018-05-31 | End: 2018-05-31

## 2018-05-31 RX ORDER — DIPHENHYDRAMINE HYDROCHLORIDE 50 MG/ML
25 INJECTION INTRAMUSCULAR; INTRAVENOUS ONCE
Status: COMPLETED | OUTPATIENT
Start: 2018-05-31 | End: 2018-05-31

## 2018-05-31 RX ADMIN — IOPAMIDOL 100 ML: 612 INJECTION, SOLUTION INTRAVENOUS at 08:53

## 2018-05-31 RX ADMIN — METHYLPREDNISOLONE SODIUM SUCCINATE 125 MG: 125 INJECTION, POWDER, FOR SOLUTION INTRAMUSCULAR; INTRAVENOUS at 06:38

## 2018-05-31 RX ADMIN — DIPHENHYDRAMINE HYDROCHLORIDE 25 MG: 50 INJECTION, SOLUTION INTRAMUSCULAR; INTRAVENOUS at 08:18

## 2018-05-31 RX ADMIN — MORPHINE SULFATE 4 MG: 2 INJECTION, SOLUTION INTRAMUSCULAR; INTRAVENOUS at 06:34

## 2018-05-31 RX ADMIN — DIPHENHYDRAMINE HYDROCHLORIDE 25 MG: 50 INJECTION, SOLUTION INTRAMUSCULAR; INTRAVENOUS at 06:37

## 2018-05-31 RX ADMIN — SODIUM CHLORIDE 500 ML: 9 INJECTION, SOLUTION INTRAVENOUS at 06:55

## 2018-06-11 ENCOUNTER — APPOINTMENT (OUTPATIENT)
Dept: CT IMAGING | Facility: HOSPITAL | Age: 33
End: 2018-06-11

## 2018-06-11 ENCOUNTER — HOSPITAL ENCOUNTER (EMERGENCY)
Facility: HOSPITAL | Age: 33
Discharge: HOME | End: 2018-06-12
Attending: EMERGENCY MEDICINE | Admitting: EMERGENCY MEDICINE

## 2018-06-11 DIAGNOSIS — R05.9 COUGH: Primary | ICD-10-CM

## 2018-06-11 DIAGNOSIS — J98.11 ATELECTASIS: ICD-10-CM

## 2018-06-11 LAB
ALBUMIN SERPL-MCNC: 4.5 G/DL (ref 3.5–5)
ALBUMIN/GLOB SERPL: 1.2 G/DL (ref 1–2)
ALP SERPL-CCNC: 87 U/L (ref 38–126)
ALT SERPL W P-5'-P-CCNC: 29 U/L (ref 13–69)
ANION GAP SERPL CALCULATED.3IONS-SCNC: 12.9 MMOL/L (ref 10–20)
AST SERPL-CCNC: 26 U/L (ref 15–46)
BASOPHILS # BLD AUTO: 0.05 10*3/MM3 (ref 0–0.2)
BASOPHILS NFR BLD AUTO: 0.7 % (ref 0–2.5)
BILIRUB SERPL-MCNC: 0.5 MG/DL (ref 0.2–1.3)
BUN BLD-MCNC: 14 MG/DL (ref 7–20)
BUN/CREAT SERPL: 17.5 (ref 6.3–21.9)
CALCIUM SPEC-SCNC: 9.6 MG/DL (ref 8.4–10.2)
CHLORIDE SERPL-SCNC: 100 MMOL/L (ref 98–107)
CO2 SERPL-SCNC: 30 MMOL/L (ref 26–30)
CREAT BLD-MCNC: 0.8 MG/DL (ref 0.6–1.3)
DEPRECATED RDW RBC AUTO: 39.3 FL (ref 37–54)
EOSINOPHIL # BLD AUTO: 0.55 10*3/MM3 (ref 0–0.7)
EOSINOPHIL NFR BLD AUTO: 8.1 % (ref 0–7)
ERYTHROCYTE [DISTWIDTH] IN BLOOD BY AUTOMATED COUNT: 13.3 % (ref 11.5–14.5)
GFR SERPL CREATININE-BSD FRML MDRD: 111 ML/MIN/1.73
GLOBULIN UR ELPH-MCNC: 3.7 GM/DL
GLUCOSE BLD-MCNC: 96 MG/DL (ref 74–98)
HCT VFR BLD AUTO: 37.5 % (ref 42–52)
HGB BLD-MCNC: 12.7 G/DL (ref 14–18)
HOLD SPECIMEN: NORMAL
HOLD SPECIMEN: NORMAL
IMM GRANULOCYTES # BLD: 0.03 10*3/MM3 (ref 0–0.06)
IMM GRANULOCYTES NFR BLD: 0.4 % (ref 0–0.6)
LYMPHOCYTES # BLD AUTO: 0.95 10*3/MM3 (ref 0.6–3.4)
LYMPHOCYTES NFR BLD AUTO: 13.9 % (ref 10–50)
MCH RBC QN AUTO: 27.5 PG (ref 27–31)
MCHC RBC AUTO-ENTMCNC: 33.9 G/DL (ref 30–37)
MCV RBC AUTO: 81.3 FL (ref 80–94)
MONOCYTES # BLD AUTO: 0.61 10*3/MM3 (ref 0–0.9)
MONOCYTES NFR BLD AUTO: 8.9 % (ref 0–12)
NEUTROPHILS # BLD AUTO: 4.63 10*3/MM3 (ref 2–6.9)
NEUTROPHILS NFR BLD AUTO: 68 % (ref 37–80)
NRBC BLD MANUAL-RTO: 0 /100 WBC (ref 0–0)
PLATELET # BLD AUTO: 236 10*3/MM3 (ref 130–400)
PMV BLD AUTO: 8.6 FL (ref 6–12)
POTASSIUM BLD-SCNC: 3.9 MMOL/L (ref 3.5–5.1)
PROT SERPL-MCNC: 8.2 G/DL (ref 6.3–8.2)
RBC # BLD AUTO: 4.61 10*6/MM3 (ref 4.7–6.1)
SODIUM BLD-SCNC: 139 MMOL/L (ref 137–145)
TROPONIN I SERPL-MCNC: <0.012 NG/ML (ref 0–0.03)
WBC NRBC COR # BLD: 6.82 10*3/MM3 (ref 4.8–10.8)
WHOLE BLOOD HOLD SPECIMEN: NORMAL
WHOLE BLOOD HOLD SPECIMEN: NORMAL

## 2018-06-11 PROCEDURE — 93005 ELECTROCARDIOGRAM TRACING: CPT | Performed by: EMERGENCY MEDICINE

## 2018-06-11 PROCEDURE — 96376 TX/PRO/DX INJ SAME DRUG ADON: CPT

## 2018-06-11 PROCEDURE — 71275 CT ANGIOGRAPHY CHEST: CPT

## 2018-06-11 PROCEDURE — 84484 ASSAY OF TROPONIN QUANT: CPT | Performed by: EMERGENCY MEDICINE

## 2018-06-11 PROCEDURE — 85025 COMPLETE CBC W/AUTO DIFF WBC: CPT | Performed by: EMERGENCY MEDICINE

## 2018-06-11 PROCEDURE — 96374 THER/PROPH/DIAG INJ IV PUSH: CPT

## 2018-06-11 PROCEDURE — 96375 TX/PRO/DX INJ NEW DRUG ADDON: CPT

## 2018-06-11 PROCEDURE — 99284 EMERGENCY DEPT VISIT MOD MDM: CPT

## 2018-06-11 PROCEDURE — 25010000002 DIPHENHYDRAMINE PER 50 MG: Performed by: EMERGENCY MEDICINE

## 2018-06-11 PROCEDURE — 80053 COMPREHEN METABOLIC PANEL: CPT | Performed by: EMERGENCY MEDICINE

## 2018-06-11 PROCEDURE — 25010000002 METHYLPREDNISOLONE PER 40 MG: Performed by: EMERGENCY MEDICINE

## 2018-06-11 PROCEDURE — 25010000002 IOPAMIDOL 61 % SOLUTION: Performed by: EMERGENCY MEDICINE

## 2018-06-11 RX ORDER — DIPHENHYDRAMINE HYDROCHLORIDE 50 MG/ML
25 INJECTION INTRAMUSCULAR; INTRAVENOUS ONCE
Status: COMPLETED | OUTPATIENT
Start: 2018-06-11 | End: 2018-06-11

## 2018-06-11 RX ORDER — METHYLPREDNISOLONE SODIUM SUCCINATE 40 MG/ML
80 INJECTION, POWDER, LYOPHILIZED, FOR SOLUTION INTRAMUSCULAR; INTRAVENOUS ONCE
Status: COMPLETED | OUTPATIENT
Start: 2018-06-11 | End: 2018-06-11

## 2018-06-11 RX ORDER — DIPHENHYDRAMINE HYDROCHLORIDE 50 MG/ML
25 INJECTION INTRAMUSCULAR; INTRAVENOUS ONCE
Status: DISCONTINUED | OUTPATIENT
Start: 2018-06-11 | End: 2018-06-11

## 2018-06-11 RX ADMIN — DIPHENHYDRAMINE HYDROCHLORIDE 25 MG: 50 INJECTION, SOLUTION INTRAMUSCULAR; INTRAVENOUS at 21:35

## 2018-06-11 RX ADMIN — IOPAMIDOL 100 ML: 612 INJECTION, SOLUTION INTRAVENOUS at 23:35

## 2018-06-11 RX ADMIN — DIPHENHYDRAMINE HYDROCHLORIDE 25 MG: 50 INJECTION, SOLUTION INTRAMUSCULAR; INTRAVENOUS at 23:36

## 2018-06-11 RX ADMIN — METHYLPREDNISOLONE SODIUM SUCCINATE 80 MG: 40 INJECTION, POWDER, FOR SOLUTION INTRAMUSCULAR; INTRAVENOUS at 21:35

## 2018-06-12 VITALS
HEIGHT: 73 IN | OXYGEN SATURATION: 95 % | HEART RATE: 98 BPM | BODY MASS INDEX: 36.68 KG/M2 | DIASTOLIC BLOOD PRESSURE: 77 MMHG | SYSTOLIC BLOOD PRESSURE: 109 MMHG | TEMPERATURE: 98.3 F | WEIGHT: 276.8 LBS | RESPIRATION RATE: 18 BRPM

## 2018-06-12 RX ORDER — TRAMADOL HYDROCHLORIDE 50 MG/1
50 TABLET ORAL EVERY 8 HOURS PRN
Qty: 12 TABLET | Refills: 0 | Status: SHIPPED | OUTPATIENT
Start: 2018-06-12 | End: 2018-07-20

## 2018-06-12 NOTE — DISCHARGE INSTRUCTIONS
You may take Tylenol 650mg every 6-8 hours as well as ibuprofen 600mg every 6-8 hours as needed for pain or fever.    Please take the prescribed tramadol only when your pain is not otherwise controlled with Tylenol or ibuprofen and when you do not have any dangerous activities to perform such as driving, as this can be dangerous.

## 2018-06-12 NOTE — ED PROVIDER NOTES
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  Describes 2 days of shortness of breath with dry cough.  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  Denies any fevers, chills, resting chest pain.  States he has a known pleural effusion on the left and is worried this may be worse.  He is on Lovenox to prevent PEs because he had a small clot in his graft previously, and has not missed any doses.  States that his heart rate is typically in the 110s.    Review of Systems   Respiratory: Positive for cough and shortness of breath.    All other systems reviewed and are negative.      Past Medical History:   Diagnosis Date   • Anxiety    • GERD (gastroesophageal reflux disease)    • Hypothyroidism    • Lymphoma     lymphoma   • Superior vena cava syndrome     has blockage to the brachiocephalics both sides   • TIA (transient ischemic attack)        Allergies   Allergen Reactions   • Contrast Dye Other (See Comments)     Seeing halos, migraines   • Zoloft [Sertraline Hcl]        Past Surgical History:   Procedure Laterality Date   • BRACHIOCEPHALIC VEIN ANGIOPLASTY / STENTING Bilateral     no stents were able to be placed   • BYPASS GRAFT  05/17/2018    Heart surgery bypass; Left IJ to right atrium   • CHEST TUBE INSERTION Right     after thoracentesis   • EXCISION MASS HEAD/NECK Left 2/27/2017    Procedure: Excisional biopsy of left occipital lymph node;  Surgeon: Catalino Damon MD;  [facility];  Service:    • RIB BIOPSY Right 2011    MEDIASTINUM   • SKIN BIOPSY      cyst on left shoulder, benign   • THORACENTESIS Right     thora drain left in for 6 months       Family History   Problem Relation Age of Onset   • Cancer Maternal Grandmother    • Cancer Maternal Grandfather    • Cancer  Paternal Grandmother    • Diabetes Paternal Grandmother    • Cancer Paternal Grandfather        Social History     Social History   • Marital status: Single   • Years of education: College     Occupational History   • Physical therapy tech Other [employer]     Social History Main Topics   • Smoking status: Never Smoker   • Smokeless tobacco: Never Used   • Alcohol use Yes      Comment: occasional, once a month   • Drug use: No   • Sexual activity: Yes     Partners: Male     Other Topics Concern   • Not on file     Social History Narrative    Lives at home with his dog.  Works as a Physical Therapist.             Objective   Physical Exam   Constitutional: He is oriented to person, place, and time. He appears well-developed and well-nourished. No distress.   HENT:   Head: Normocephalic and atraumatic.   Mouth/Throat: Oropharynx is clear and moist.   Eyes: No scleral icterus.   Neck: Normal range of motion. Neck supple. No tracheal deviation present.   Cardiovascular: Regular rhythm, normal heart sounds and intact distal pulses.  Exam reveals no gallop and no friction rub.    No murmur heard.  Tachycardic   Pulmonary/Chest: Effort normal and breath sounds normal. No stridor. No respiratory distress. He has no wheezes. He has no rales.   Midline, healing wound with very minimal gape in superior margin.    Abdominal: Soft. He exhibits no distension and no mass. There is no tenderness. There is no rebound and no guarding.   Musculoskeletal: Normal range of motion. He exhibits no deformity.   Neurological: He is alert and oriented to person, place, and time.   Skin: Skin is warm and dry. No rash noted. He is not diaphoretic. No erythema. No pallor.   Psychiatric: He has a normal mood and affect. His behavior is normal.   Nursing note and vitals reviewed.      Procedures           ED Course                  MDM  33-year-old male here with shortness of breath and cough.  Afebrile, vital signs were all for tachycardia.   Faint crackles left base.  Concern for possible worsening pleural effusion versus pneumonia versus less likely PE given he has anticoagulated.  We'll send labs, ekg, CT PE and reassess.     1:12 AM labs are reassuring and the CT scan shows only some postoperative pericardial effusion that was small, mild pleural effusion with atelectasis and possible stone in the gallbladder neck.  However, the is no right upper quadrant pain and he states that the effusions are not new.  I discussed the importance of continuing to use his incentive spirometer as well as the risk of addiction to stronger pain medications.  He states that he is very aware of this and will have a couple of Lortabs left.  We will try him on a very short prescription for tramadol and have him follow-up with his doctor for reassessment.    Final diagnoses:   Cough   Atelectasis            Elier Blanchard MD  06/12/18 0115

## 2018-06-18 RX ORDER — LEVOTHYROXINE SODIUM 75 MCG
75 TABLET ORAL DAILY
Qty: 30 TABLET | Refills: 2 | Status: SHIPPED | OUTPATIENT
Start: 2018-06-18 | End: 2018-07-23 | Stop reason: DRUGHIGH

## 2018-06-19 ENCOUNTER — HOSPITAL ENCOUNTER (EMERGENCY)
Facility: HOSPITAL | Age: 33
Discharge: HOME | End: 2018-06-19
Attending: EMERGENCY MEDICINE | Admitting: EMERGENCY MEDICINE

## 2018-06-19 ENCOUNTER — APPOINTMENT (OUTPATIENT)
Dept: CARDIOLOGY | Facility: HOSPITAL | Age: 33
End: 2018-06-19
Attending: EMERGENCY MEDICINE

## 2018-06-19 ENCOUNTER — APPOINTMENT (OUTPATIENT)
Dept: GENERAL RADIOLOGY | Facility: HOSPITAL | Age: 33
End: 2018-06-19

## 2018-06-19 VITALS
HEIGHT: 71 IN | RESPIRATION RATE: 16 BRPM | SYSTOLIC BLOOD PRESSURE: 116 MMHG | BODY MASS INDEX: 31.78 KG/M2 | HEART RATE: 87 BPM | DIASTOLIC BLOOD PRESSURE: 79 MMHG | OXYGEN SATURATION: 99 % | WEIGHT: 227 LBS | TEMPERATURE: 96.8 F

## 2018-06-19 DIAGNOSIS — I31.39 PERICARDIAL EFFUSION: Primary | ICD-10-CM

## 2018-06-19 LAB
ALBUMIN SERPL-MCNC: 4 G/DL (ref 3.5–5.2)
ALBUMIN/GLOB SERPL: 1.1 G/DL
ALP SERPL-CCNC: 86 U/L (ref 39–117)
ALT SERPL W P-5'-P-CCNC: 22 U/L (ref 1–41)
ANION GAP SERPL CALCULATED.3IONS-SCNC: 12.2 MMOL/L
APTT PPP: 47.9 SECONDS (ref 22.7–35.4)
AST SERPL-CCNC: 15 U/L (ref 1–40)
BASOPHILS # BLD AUTO: 0.08 10*3/MM3 (ref 0–0.2)
BASOPHILS NFR BLD AUTO: 1.5 % (ref 0–1.5)
BH CV ECHO MEAS - ACS: 2.2 CM
BH CV ECHO MEAS - AO MAX PG (FULL): 0.38 MMHG
BH CV ECHO MEAS - AO MAX PG: 2.2 MMHG
BH CV ECHO MEAS - AO MEAN PG (FULL): 0 MMHG
BH CV ECHO MEAS - AO MEAN PG: 1 MMHG
BH CV ECHO MEAS - AO ROOT AREA (BSA CORRECTED): 1.3
BH CV ECHO MEAS - AO ROOT AREA: 6.6 CM^2
BH CV ECHO MEAS - AO ROOT DIAM: 2.9 CM
BH CV ECHO MEAS - AO V2 MAX: 74.5 CM/SEC
BH CV ECHO MEAS - AO V2 MEAN: 49 CM/SEC
BH CV ECHO MEAS - AO V2 VTI: 12.9 CM
BH CV ECHO MEAS - AVA(I,A): 4.2 CM^2
BH CV ECHO MEAS - AVA(I,D): 4.2 CM^2
BH CV ECHO MEAS - AVA(V,A): 4.1 CM^2
BH CV ECHO MEAS - AVA(V,D): 4.1 CM^2
BH CV ECHO MEAS - BSA(HAYCOCK): 2.3 M^2
BH CV ECHO MEAS - BSA: 2.2 M^2
BH CV ECHO MEAS - BZI_BMI: 31.7 KILOGRAMS/M^2
BH CV ECHO MEAS - BZI_METRIC_HEIGHT: 180.3 CM
BH CV ECHO MEAS - BZI_METRIC_WEIGHT: 103 KG
BH CV ECHO MEAS - CONTRAST EF (2CH): 51.6 ML/M^2
BH CV ECHO MEAS - CONTRAST EF 4CH: 56.3 ML/M^2
BH CV ECHO MEAS - EDV(CUBED): 148.9 ML
BH CV ECHO MEAS - EDV(MOD-SP2): 153 ML
BH CV ECHO MEAS - EDV(MOD-SP4): 167 ML
BH CV ECHO MEAS - EDV(TEICH): 135.3 ML
BH CV ECHO MEAS - EF(CUBED): 50.2 %
BH CV ECHO MEAS - EF(MOD-BP): 50 %
BH CV ECHO MEAS - EF(MOD-SP2): 51.6 %
BH CV ECHO MEAS - EF(MOD-SP4): 56.3 %
BH CV ECHO MEAS - EF(TEICH): 41.9 %
BH CV ECHO MEAS - ESV(CUBED): 74.1 ML
BH CV ECHO MEAS - ESV(MOD-SP2): 74 ML
BH CV ECHO MEAS - ESV(MOD-SP4): 73 ML
BH CV ECHO MEAS - ESV(TEICH): 78.6 ML
BH CV ECHO MEAS - FS: 20.8 %
BH CV ECHO MEAS - IVS/LVPW: 1
BH CV ECHO MEAS - IVSD: 1 CM
BH CV ECHO MEAS - LAT PEAK E' VEL: 9 CM/SEC
BH CV ECHO MEAS - LV DIASTOLIC VOL/BSA (35-75): 75 ML/M^2
BH CV ECHO MEAS - LV MASS(C)D: 200.4 GRAMS
BH CV ECHO MEAS - LV MASS(C)DI: 90 GRAMS/M^2
BH CV ECHO MEAS - LV MAX PG: 1.8 MMHG
BH CV ECHO MEAS - LV MEAN PG: 1 MMHG
BH CV ECHO MEAS - LV SYSTOLIC VOL/BSA (12-30): 32.8 ML/M^2
BH CV ECHO MEAS - LV V1 MAX: 67.9 CM/SEC
BH CV ECHO MEAS - LV V1 MEAN: 42.5 CM/SEC
BH CV ECHO MEAS - LV V1 VTI: 12 CM
BH CV ECHO MEAS - LVIDD: 5.3 CM
BH CV ECHO MEAS - LVIDS: 4.2 CM
BH CV ECHO MEAS - LVLD AP2: 8.8 CM
BH CV ECHO MEAS - LVLD AP4: 8.9 CM
BH CV ECHO MEAS - LVLS AP2: 8.1 CM
BH CV ECHO MEAS - LVLS AP4: 7.7 CM
BH CV ECHO MEAS - LVOT AREA (M): 4.5 CM^2
BH CV ECHO MEAS - LVOT AREA: 4.5 CM^2
BH CV ECHO MEAS - LVOT DIAM: 2.4 CM
BH CV ECHO MEAS - LVPWD: 1 CM
BH CV ECHO MEAS - MED PEAK E' VEL: 7 CM/SEC
BH CV ECHO MEAS - MV A DUR: 0.16 SEC
BH CV ECHO MEAS - MV A MAX VEL: 47.3 CM/SEC
BH CV ECHO MEAS - MV DEC SLOPE: 313 CM/SEC^2
BH CV ECHO MEAS - MV DEC TIME: 0.17 SEC
BH CV ECHO MEAS - MV E MAX VEL: 56.6 CM/SEC
BH CV ECHO MEAS - MV E/A: 1.2
BH CV ECHO MEAS - MV MAX PG: 1.6 MMHG
BH CV ECHO MEAS - MV MEAN PG: 1 MMHG
BH CV ECHO MEAS - MV P1/2T MAX VEL: 66 CM/SEC
BH CV ECHO MEAS - MV P1/2T: 61.8 MSEC
BH CV ECHO MEAS - MV V2 MAX: 63.8 CM/SEC
BH CV ECHO MEAS - MV V2 MEAN: 33.9 CM/SEC
BH CV ECHO MEAS - MV V2 VTI: 12.8 CM
BH CV ECHO MEAS - MVA P1/2T LCG: 3.3 CM^2
BH CV ECHO MEAS - MVA(P1/2T): 3.6 CM^2
BH CV ECHO MEAS - MVA(VTI): 4.2 CM^2
BH CV ECHO MEAS - PA ACC TIME: 0.12 SEC
BH CV ECHO MEAS - PA MAX PG (FULL): 2.8 MMHG
BH CV ECHO MEAS - PA MAX PG: 3.6 MMHG
BH CV ECHO MEAS - PA PR(ACCEL): 26.8 MMHG
BH CV ECHO MEAS - PA V2 MAX: 94.7 CM/SEC
BH CV ECHO MEAS - PULM A REVS DUR: 0.16 SEC
BH CV ECHO MEAS - PULM A REVS VEL: 21.9 CM/SEC
BH CV ECHO MEAS - PULM DIAS VEL: 55.5 CM/SEC
BH CV ECHO MEAS - PULM S/D: 0.57
BH CV ECHO MEAS - PULM SYS VEL: 31.5 CM/SEC
BH CV ECHO MEAS - PVA(V,A): 2 CM^2
BH CV ECHO MEAS - PVA(V,D): 2 CM^2
BH CV ECHO MEAS - QP/QS: 0.64
BH CV ECHO MEAS - RV MAX PG: 0.81 MMHG
BH CV ECHO MEAS - RV MEAN PG: 0 MMHG
BH CV ECHO MEAS - RV V1 MAX: 45.1 CM/SEC
BH CV ECHO MEAS - RV V1 MEAN: 23.3 CM/SEC
BH CV ECHO MEAS - RV V1 VTI: 8.4 CM
BH CV ECHO MEAS - RVOT AREA: 4.2 CM^2
BH CV ECHO MEAS - RVOT DIAM: 2.3 CM
BH CV ECHO MEAS - SI(AO): 38.3 ML/M^2
BH CV ECHO MEAS - SI(CUBED): 33.6 ML/M^2
BH CV ECHO MEAS - SI(LVOT): 24.4 ML/M^2
BH CV ECHO MEAS - SI(MOD-SP2): 35.5 ML/M^2
BH CV ECHO MEAS - SI(MOD-SP4): 42.2 ML/M^2
BH CV ECHO MEAS - SI(TEICH): 25.5 ML/M^2
BH CV ECHO MEAS - SUP REN AO DIAM: 2 CM
BH CV ECHO MEAS - SV(AO): 85.2 ML
BH CV ECHO MEAS - SV(CUBED): 74.8 ML
BH CV ECHO MEAS - SV(LVOT): 54.3 ML
BH CV ECHO MEAS - SV(MOD-SP2): 79 ML
BH CV ECHO MEAS - SV(MOD-SP4): 94 ML
BH CV ECHO MEAS - SV(RVOT): 34.9 ML
BH CV ECHO MEAS - SV(TEICH): 56.8 ML
BH CV ECHO MEAS - TAPSE (>1.6): 1.8 CM2
BH CV ECHO MEASUREMENTS AVERAGE E/E' RATIO: 7.08
BH CV VAS BP RIGHT ARM: NORMAL MMHG
BH CV XLRA - RV BASE: 3.2 CM
BH CV XLRA - TDI S': 12 CM/SEC
BILIRUB SERPL-MCNC: 0.3 MG/DL (ref 0.1–1.2)
BUN BLD-MCNC: 15 MG/DL (ref 6–20)
BUN/CREAT SERPL: 15.5 (ref 7–25)
CALCIUM SPEC-SCNC: 9.5 MG/DL (ref 8.6–10.5)
CHLORIDE SERPL-SCNC: 101 MMOL/L (ref 98–107)
CO2 SERPL-SCNC: 25.8 MMOL/L (ref 22–29)
CREAT BLD-MCNC: 0.97 MG/DL (ref 0.76–1.27)
DEPRECATED RDW RBC AUTO: 41.5 FL (ref 37–54)
EOSINOPHIL # BLD AUTO: 0.74 10*3/MM3 (ref 0–0.7)
EOSINOPHIL NFR BLD AUTO: 13.6 % (ref 0.3–6.2)
ERYTHROCYTE [DISTWIDTH] IN BLOOD BY AUTOMATED COUNT: 13.6 % (ref 11.5–14.5)
GFR SERPL CREATININE-BSD FRML MDRD: 89 ML/MIN/1.73
GLOBULIN UR ELPH-MCNC: 3.7 GM/DL
GLUCOSE BLD-MCNC: 106 MG/DL (ref 65–99)
HCT VFR BLD AUTO: 39.4 % (ref 40.4–52.2)
HGB BLD-MCNC: 12.7 G/DL (ref 13.7–17.6)
IMM GRANULOCYTES # BLD: 0.07 10*3/MM3 (ref 0–0.03)
IMM GRANULOCYTES NFR BLD: 1.3 % (ref 0–0.5)
INR PPP: 1.12 (ref 0.9–1.1)
LEFT ATRIUM VOLUME INDEX: 23 ML/M2
LYMPHOCYTES # BLD AUTO: 1.3 10*3/MM3 (ref 0.9–4.8)
LYMPHOCYTES NFR BLD AUTO: 23.9 % (ref 19.6–45.3)
MAXIMAL PREDICTED HEART RATE: 187 BPM
MCH RBC QN AUTO: 26.7 PG (ref 27–32.7)
MCHC RBC AUTO-ENTMCNC: 32.2 G/DL (ref 32.6–36.4)
MCV RBC AUTO: 82.9 FL (ref 79.8–96.2)
MONOCYTES # BLD AUTO: 0.37 10*3/MM3 (ref 0.2–1.2)
MONOCYTES NFR BLD AUTO: 6.8 % (ref 5–12)
NEUTROPHILS # BLD AUTO: 2.95 10*3/MM3 (ref 1.9–8.1)
NEUTROPHILS NFR BLD AUTO: 54.2 % (ref 42.7–76)
NT-PROBNP SERPL-MCNC: 321.3 PG/ML (ref 5–450)
PLATELET # BLD AUTO: 260 10*3/MM3 (ref 140–500)
PMV BLD AUTO: 9.1 FL (ref 6–12)
POTASSIUM BLD-SCNC: 4 MMOL/L (ref 3.5–5.2)
PROT SERPL-MCNC: 7.7 G/DL (ref 6–8.5)
PROTHROMBIN TIME: 14.2 SECONDS (ref 11.7–14.2)
RBC # BLD AUTO: 4.75 10*6/MM3 (ref 4.6–6)
SODIUM BLD-SCNC: 139 MMOL/L (ref 136–145)
STRESS TARGET HR: 159 BPM
TROPONIN T SERPL-MCNC: <0.01 NG/ML (ref 0–0.03)
WBC NRBC COR # BLD: 5.44 10*3/MM3 (ref 4.5–10.7)

## 2018-06-19 PROCEDURE — 85730 THROMBOPLASTIN TIME PARTIAL: CPT | Performed by: EMERGENCY MEDICINE

## 2018-06-19 PROCEDURE — 83880 ASSAY OF NATRIURETIC PEPTIDE: CPT | Performed by: EMERGENCY MEDICINE

## 2018-06-19 PROCEDURE — 93005 ELECTROCARDIOGRAM TRACING: CPT | Performed by: EMERGENCY MEDICINE

## 2018-06-19 PROCEDURE — 25010000002 PERFLUTREN (DEFINITY) 8.476 MG IN SODIUM CHLORIDE 0.9 % 10 ML INJECTION: Performed by: EMERGENCY MEDICINE

## 2018-06-19 PROCEDURE — 84484 ASSAY OF TROPONIN QUANT: CPT | Performed by: EMERGENCY MEDICINE

## 2018-06-19 PROCEDURE — 93306 TTE W/DOPPLER COMPLETE: CPT

## 2018-06-19 PROCEDURE — 80053 COMPREHEN METABOLIC PANEL: CPT | Performed by: EMERGENCY MEDICINE

## 2018-06-19 PROCEDURE — 85025 COMPLETE CBC W/AUTO DIFF WBC: CPT | Performed by: EMERGENCY MEDICINE

## 2018-06-19 PROCEDURE — 85610 PROTHROMBIN TIME: CPT | Performed by: EMERGENCY MEDICINE

## 2018-06-19 PROCEDURE — 99284 EMERGENCY DEPT VISIT MOD MDM: CPT

## 2018-06-19 PROCEDURE — 93306 TTE W/DOPPLER COMPLETE: CPT | Performed by: INTERNAL MEDICINE

## 2018-06-19 PROCEDURE — 93010 ELECTROCARDIOGRAM REPORT: CPT | Performed by: INTERNAL MEDICINE

## 2018-06-19 PROCEDURE — 71045 X-RAY EXAM CHEST 1 VIEW: CPT

## 2018-06-19 RX ORDER — NAPROXEN SODIUM 220 MG
440 TABLET ORAL 2 TIMES DAILY PRN
Qty: 60 TABLET | Refills: 0 | Status: SHIPPED | OUTPATIENT
Start: 2018-06-19 | End: 2018-07-20

## 2018-06-19 RX ORDER — SODIUM CHLORIDE 0.9 % (FLUSH) 0.9 %
10 SYRINGE (ML) INJECTION AS NEEDED
Status: DISCONTINUED | OUTPATIENT
Start: 2018-06-19 | End: 2018-06-19 | Stop reason: HOSPADM

## 2018-06-19 RX ORDER — ASPIRIN 81 MG/1
81 TABLET, CHEWABLE ORAL DAILY
COMMUNITY
End: 2018-06-19

## 2018-06-19 RX ADMIN — PERFLUTREN 3 ML: 6.52 INJECTION, SUSPENSION INTRAVENOUS at 10:15

## 2018-06-19 NOTE — ED NOTES
"Pt complaint is his heart started fluttering and shortness of breath worse with exertion for two days. Pt reports he had a CABG five weeks ago at [facility]. Pt reports he has had \"fluid build up since and I think it's getting worse\".      Marcia Castelan RN  06/19/18 0802    "

## 2018-06-21 ENCOUNTER — TELEPHONE (OUTPATIENT)
Dept: SOCIAL WORK | Facility: HOSPITAL | Age: 33
End: 2018-06-21

## 2018-07-20 ENCOUNTER — OFFICE VISIT (OUTPATIENT)
Dept: FAMILY MEDICINE CLINIC | Facility: CLINIC | Age: 33
End: 2018-07-20

## 2018-07-20 VITALS
RESPIRATION RATE: 13 BRPM | DIASTOLIC BLOOD PRESSURE: 84 MMHG | BODY MASS INDEX: 31.78 KG/M2 | HEART RATE: 113 BPM | HEIGHT: 71 IN | OXYGEN SATURATION: 98 % | WEIGHT: 227 LBS | SYSTOLIC BLOOD PRESSURE: 118 MMHG

## 2018-07-20 DIAGNOSIS — F41.9 ANXIETY: ICD-10-CM

## 2018-07-20 DIAGNOSIS — D64.9 ANEMIA, UNSPECIFIED TYPE: ICD-10-CM

## 2018-07-20 DIAGNOSIS — Z86.79 HISTORY OF PERICARDITIS: ICD-10-CM

## 2018-07-20 DIAGNOSIS — Z79.01 ANTICOAGULATION MANAGEMENT ENCOUNTER: ICD-10-CM

## 2018-07-20 DIAGNOSIS — R61 HYPERHIDROSIS: ICD-10-CM

## 2018-07-20 DIAGNOSIS — Z51.81 ANTICOAGULATION MANAGEMENT ENCOUNTER: ICD-10-CM

## 2018-07-20 DIAGNOSIS — R53.83 OTHER FATIGUE: ICD-10-CM

## 2018-07-20 DIAGNOSIS — Z09 HOSPITAL DISCHARGE FOLLOW-UP: Primary | ICD-10-CM

## 2018-07-20 DIAGNOSIS — E03.9 HYPOTHYROIDISM, UNSPECIFIED TYPE: ICD-10-CM

## 2018-07-20 LAB
ALBUMIN SERPL-MCNC: 5.2 G/DL (ref 3.5–5.2)
ALBUMIN/GLOB SERPL: 2 G/DL
ALP SERPL-CCNC: 70 U/L (ref 39–117)
ALT SERPL-CCNC: 34 U/L (ref 1–41)
AST SERPL-CCNC: 16 U/L (ref 1–40)
BASOPHILS # BLD AUTO: 0.03 10*3/MM3 (ref 0–0.2)
BASOPHILS NFR BLD AUTO: 0.7 % (ref 0–1.5)
BILIRUB SERPL-MCNC: 0.5 MG/DL (ref 0.1–1.2)
BUN SERPL-MCNC: 16 MG/DL (ref 6–20)
BUN/CREAT SERPL: 15.2 (ref 7–25)
CALCIUM SERPL-MCNC: 9.1 MG/DL (ref 8.6–10.5)
CHLORIDE SERPL-SCNC: 96 MMOL/L (ref 98–107)
CO2 SERPL-SCNC: 23.4 MMOL/L (ref 22–29)
CREAT SERPL-MCNC: 1.05 MG/DL (ref 0.76–1.27)
EOSINOPHIL # BLD AUTO: 0.23 10*3/MM3 (ref 0–0.7)
EOSINOPHIL NFR BLD AUTO: 5.2 % (ref 0.3–6.2)
ERYTHROCYTE [DISTWIDTH] IN BLOOD BY AUTOMATED COUNT: 14.6 % (ref 11.5–14.5)
GLOBULIN SER CALC-MCNC: 2.6 GM/DL
GLUCOSE SERPL-MCNC: 87 MG/DL (ref 65–99)
HCT VFR BLD AUTO: 44.9 % (ref 40.4–52.2)
HGB BLD-MCNC: 14.5 G/DL (ref 13.7–17.6)
IMM GRANULOCYTES # BLD: 0.02 10*3/MM3 (ref 0–0.03)
IMM GRANULOCYTES NFR BLD: 0.5 % (ref 0–0.5)
INR PPP: 1.11 (ref 0.9–1.1)
LYMPHOCYTES # BLD AUTO: 1.01 10*3/MM3 (ref 0.9–4.8)
LYMPHOCYTES NFR BLD AUTO: 23 % (ref 19.6–45.3)
MCH RBC QN AUTO: 26.3 PG (ref 27–32.7)
MCHC RBC AUTO-ENTMCNC: 32.3 G/DL (ref 32.6–36.4)
MCV RBC AUTO: 81.3 FL (ref 79.8–96.2)
MONOCYTES # BLD AUTO: 0.4 10*3/MM3 (ref 0.2–1.2)
MONOCYTES NFR BLD AUTO: 9.1 % (ref 5–12)
NEUTROPHILS # BLD AUTO: 2.73 10*3/MM3 (ref 1.9–8.1)
NEUTROPHILS NFR BLD AUTO: 62 % (ref 42.7–76)
PLATELET # BLD AUTO: 188 10*3/MM3 (ref 140–500)
POTASSIUM SERPL-SCNC: 4 MMOL/L (ref 3.5–5.2)
PROT SERPL-MCNC: 7.8 G/DL (ref 6–8.5)
PROTHROMBIN TIME: 14.1 SECONDS (ref 11.7–14.2)
RBC # BLD AUTO: 5.52 10*6/MM3 (ref 4.6–6)
SODIUM SERPL-SCNC: 135 MMOL/L (ref 136–145)
TSH SERPL DL<=0.005 MIU/L-ACNC: 3.12 MIU/ML (ref 0.27–4.2)
WBC # BLD AUTO: 4.4 10*3/MM3 (ref 4.5–10.7)

## 2018-07-20 PROCEDURE — 99215 OFFICE O/P EST HI 40 MIN: CPT | Performed by: FAMILY MEDICINE

## 2018-07-23 ENCOUNTER — TELEPHONE (OUTPATIENT)
Dept: FAMILY MEDICINE CLINIC | Facility: CLINIC | Age: 33
End: 2018-07-23

## 2018-07-23 DIAGNOSIS — E03.9 HYPOTHYROIDISM, UNSPECIFIED TYPE: Primary | ICD-10-CM

## 2018-07-23 DIAGNOSIS — E87.1 HYPONATREMIA: ICD-10-CM

## 2018-07-23 DIAGNOSIS — D72.819 LEUKOPENIA, UNSPECIFIED TYPE: ICD-10-CM

## 2018-07-23 RX ORDER — LEVOTHYROXINE SODIUM 88 UG/1
88 TABLET ORAL DAILY
Qty: 90 TABLET | Refills: 1 | Status: SHIPPED | OUTPATIENT
Start: 2018-07-23 | End: 2018-07-30 | Stop reason: SDUPTHER

## 2018-07-23 NOTE — TELEPHONE ENCOUNTER
Left message regarding lab results. Says he will need to  refill of thyroid medication soon & knows he was to wait until results were back.    Please advise about results & if dose needs to be adjusted or not. Thanks!

## 2018-07-24 ENCOUNTER — TELEPHONE (OUTPATIENT)
Dept: FAMILY MEDICINE CLINIC | Facility: CLINIC | Age: 33
End: 2018-07-24

## 2018-07-24 NOTE — TELEPHONE ENCOUNTER
Called and spoke with patient to let him know his lab results. His phone was breaking up, so he is sending a [employer] message to ask about a medication.     ----- Message from Melina Kothari MD sent at 7/23/2018  4:40 PM EDT -----  Please contact pt and let him know that his synthroid needs to be increased from 75 to 88 mcg per day (newRx sent electronically).  His white blood cell count and sodium levels are both running a little low.  I would like to repeat these levels in 2-3 weeks to keep a close eye on it.  There is no need to fast for this follow up blood work.

## 2018-07-25 ENCOUNTER — TELEPHONE (OUTPATIENT)
Dept: FAMILY MEDICINE CLINIC | Facility: CLINIC | Age: 33
End: 2018-07-25

## 2018-07-25 DIAGNOSIS — T82.9XXD: Primary | ICD-10-CM

## 2018-07-25 DIAGNOSIS — Z91.041 ALLERGIC TO IV CONTRAST: ICD-10-CM

## 2018-07-25 DIAGNOSIS — I87.1 SUPERIOR VENA CAVA SYNDROME: ICD-10-CM

## 2018-07-25 RX ORDER — DIPHENHYDRAMINE HYDROCHLORIDE 50 MG/ML
50 INJECTION INTRAMUSCULAR; INTRAVENOUS
Status: SHIPPED | OUTPATIENT
Start: 2018-07-25 | End: 2018-07-25

## 2018-07-25 NOTE — TELEPHONE ENCOUNTER
Pt called and follow up plans discussed.  I called Dr. Pham's office at the [facility] and discussed pt's care with one of the nurse practitioners there as well.  Plan to continue Lovenox 40 mg BID until pt is able to follow up at the [facility] in late August.  Will obtain CT venogram of chest and neck here in [place] and forward it to Dr. Pham for review.  Orders for imaging and anticoagulant have been placed electronically.        ----- Message from Scott Allen sent at 7/24/2018 12:20 PM EDT -----  Regarding: RE: Prescription Question  Contact: [phone]  It is [facility], their number is [phone].  I am taking 1mL, twice a day. By chance did my [facility] surgeon contact you yet about what scan I need done? If it would be easier for you to contact them, the number is [phone]. That is Dr. Anaya direct contact to his assistant.     Thank you,  Jim    ----- Message -----  From: Melina Kothari MD  Sent: 7/24/18, 11:26 AM  To: Stan Allen  Subject: RE: Prescription Question    Yes, I will be happy to order your Enoxaparin.  Which mail order pharmacy do we need to use?  Also, what dose do you currently take?    Thanks!    Dr. Kothari      ----- Message -----     From: Stan Allen     Sent: 7/23/2018  4:47 PM EDT       To: Melina Kothari MD  Subject: Prescription Question    So Dr. Kothari and I had discussed an issue with getting my Enoxaparin due to an insurance issue. Basically, I can only fill my prescription 2 times at a retail pharmacy, and after that I have to use to insurance companies home delivery option to avoid paying full price. I called my surgeon to have them contact the insurance company to process the script and get it out to me, but his  told me they usually let the general practitioners handle the meds post surgery. All I need is for her to contact the insurance so the script can be filled. Does that make sense?

## 2018-07-25 NOTE — TELEPHONE ENCOUNTER
Patient left  asking about his refill of Enoxaparin. He said he is getting low on his Rx and wanted to follow up on his [employer] message.

## 2018-07-30 DIAGNOSIS — Z51.81 ANTICOAGULATION MANAGEMENT ENCOUNTER: ICD-10-CM

## 2018-07-30 DIAGNOSIS — Z79.01 ANTICOAGULATION MANAGEMENT ENCOUNTER: ICD-10-CM

## 2018-07-30 DIAGNOSIS — E03.9 HYPOTHYROIDISM, UNSPECIFIED TYPE: ICD-10-CM

## 2018-07-30 RX ORDER — LEVOTHYROXINE SODIUM 88 UG/1
88 TABLET ORAL DAILY
Qty: 90 TABLET | Refills: 1 | Status: SHIPPED | OUTPATIENT
Start: 2018-07-30 | End: 2019-09-09 | Stop reason: ALTCHOICE

## 2018-08-02 ENCOUNTER — TELEPHONE (OUTPATIENT)
Dept: FAMILY MEDICINE CLINIC | Facility: CLINIC | Age: 33
End: 2018-08-02

## 2018-08-02 ENCOUNTER — APPOINTMENT (OUTPATIENT)
Dept: CT IMAGING | Facility: HOSPITAL | Age: 33
End: 2018-08-02

## 2018-08-02 PROBLEM — Z86.79 HISTORY OF PERICARDITIS: Status: ACTIVE | Noted: 2018-08-02

## 2018-08-02 PROBLEM — H53.9 VISUAL DISTURBANCE: Status: ACTIVE | Noted: 2017-09-11

## 2018-08-02 PROBLEM — D64.9 ANEMIA: Status: ACTIVE | Noted: 2018-08-02

## 2018-08-02 PROBLEM — R26.89 OTHER ABNORMALITIES OF GAIT AND MOBILITY: Status: ACTIVE | Noted: 2017-08-21

## 2018-08-02 PROBLEM — G93.32 CHRONIC FATIGUE SYNDROME: Status: ACTIVE | Noted: 2017-10-12

## 2018-08-02 PROBLEM — R61 HYPERHIDROSIS: Status: ACTIVE | Noted: 2018-08-02

## 2018-08-02 PROBLEM — G47.00 PERSISTENT INSOMNIA: Status: ACTIVE | Noted: 2017-10-13

## 2018-08-02 PROBLEM — F41.9 ANXIETY: Status: ACTIVE | Noted: 2018-08-02

## 2018-08-02 NOTE — TELEPHONE ENCOUNTER
Verbal orders given by phone for Benadryl 50 mg IV and Solumedrol 40 mg IVPB to be given 1 hr prior to CT venogram tomorrow.  Voicemail left advising pt that this had been done.

## 2018-08-02 NOTE — TELEPHONE ENCOUNTER
Patient left a VM stating that he has a CT Angio tomorrow, and he is supposed to be pre-treated with prednisone and benadryl before the procedure.  ANTONY Woods told him Dr. Kothari needs to prescribe this for him.

## 2018-08-03 ENCOUNTER — TELEPHONE (OUTPATIENT)
Dept: FAMILY MEDICINE CLINIC | Facility: CLINIC | Age: 33
End: 2018-08-03

## 2018-08-03 ENCOUNTER — HOSPITAL ENCOUNTER (OUTPATIENT)
Dept: CT IMAGING | Facility: HOSPITAL | Age: 33
Discharge: HOME | End: 2018-08-03

## 2018-08-03 DIAGNOSIS — T82.9XXD: ICD-10-CM

## 2018-08-03 DIAGNOSIS — I87.1 SUPERIOR VENA CAVA SYNDROME: ICD-10-CM

## 2018-08-03 RX ORDER — PREDNISONE 50 MG/1
50 TABLET ORAL DAILY
Qty: 3 TABLET | Refills: 0 | Status: SHIPPED | OUTPATIENT
Start: 2018-08-03 | End: 2018-09-17

## 2018-08-03 NOTE — NURSING NOTE
Spoke with Thuy in Dr. Kothari's office regarding 13 hour prep for contrast allergy.  We do not give the IV emergency prep unless it is truly and emergent scan.  This was a regular scheduled CT scan.  Faxed our 13 hour prep to Thuy to be called in to [facility].

## 2018-08-03 NOTE — TELEPHONE ENCOUNTER
This patient is allergic to IV contrast, the office was contacted yesterday to obtain orders to prep for a CT scan that was scheduled today. [facility] contacted the office stating that they are unable to administer medication through an IV, unless the patient is inpatient, which is what Dr. Kothari had ordered. The CT was rescheduled to Monday. They will fax over the medications that they are requesting Dr. Kothari to prescribe to prep this patient for the scan.

## 2018-08-06 ENCOUNTER — HOSPITAL ENCOUNTER (OUTPATIENT)
Dept: CT IMAGING | Facility: HOSPITAL | Age: 33
Discharge: HOME | End: 2018-08-06
Admitting: FAMILY MEDICINE

## 2018-08-06 DIAGNOSIS — I87.1 SUPERIOR VENA CAVA SYNDROME: ICD-10-CM

## 2018-08-06 DIAGNOSIS — T82.9XXD: ICD-10-CM

## 2018-08-06 PROCEDURE — 71260 CT THORAX DX C+: CPT

## 2018-08-06 PROCEDURE — 25010000002 IOPAMIDOL 61 % SOLUTION: Performed by: FAMILY MEDICINE

## 2018-08-06 PROCEDURE — 70491 CT SOFT TISSUE NECK W/DYE: CPT

## 2018-08-06 RX ADMIN — IOPAMIDOL 85 ML: 612 INJECTION, SOLUTION INTRAVENOUS at 11:55

## 2018-08-06 NOTE — TELEPHONE ENCOUNTER
I called and spoke to the radiology technician at [facility] this morning, and he is on their schedule for this morning, with plan for oral pre-medication.

## 2018-08-31 ENCOUNTER — RESULTS ENCOUNTER (OUTPATIENT)
Dept: FAMILY MEDICINE CLINIC | Facility: CLINIC | Age: 33
End: 2018-08-31

## 2018-08-31 DIAGNOSIS — D72.819 LEUKOPENIA, UNSPECIFIED TYPE: ICD-10-CM

## 2018-08-31 DIAGNOSIS — I87.1 SUPERIOR VENA CAVA SYNDROME: ICD-10-CM

## 2018-08-31 DIAGNOSIS — E87.1 HYPONATREMIA: ICD-10-CM

## 2018-08-31 DIAGNOSIS — T82.9XXD: ICD-10-CM

## 2018-09-12 ENCOUNTER — TELEPHONE (OUTPATIENT)
Dept: FAMILY MEDICINE CLINIC | Facility: CLINIC | Age: 33
End: 2018-09-12

## 2018-09-12 DIAGNOSIS — I87.1 SUPERIOR VENA CAVA SYNDROME: ICD-10-CM

## 2018-09-12 DIAGNOSIS — T82.9XXD: ICD-10-CM

## 2018-09-12 NOTE — TELEPHONE ENCOUNTER
Left VM stating that he was using an online pharmacy and where he switched to COBRA insurance and there was a lapse and they can't fill the Lovenox and he's about to run out. He is having some issues with the insurance.     I called and spoke with patient to let him know we would send in a 30 day supply and it would be $76 at [employer] through [employer]. He understands and accepts.

## 2018-09-13 ENCOUNTER — OFFICE VISIT (OUTPATIENT)
Dept: NEUROLOGY | Facility: CLINIC | Age: 33
End: 2018-09-13

## 2018-09-13 VITALS
HEART RATE: 105 BPM | OXYGEN SATURATION: 97 % | DIASTOLIC BLOOD PRESSURE: 74 MMHG | SYSTOLIC BLOOD PRESSURE: 118 MMHG | WEIGHT: 230 LBS | BODY MASS INDEX: 32.2 KG/M2 | HEIGHT: 71 IN

## 2018-09-13 DIAGNOSIS — R25.3 FASCICULATIONS: Primary | ICD-10-CM

## 2018-09-13 PROCEDURE — 99243 OFF/OP CNSLTJ NEW/EST LOW 30: CPT | Performed by: PSYCHIATRY & NEUROLOGY

## 2018-09-13 NOTE — PROGRESS NOTES
"CC: Fasciculations     HPI:  Scott Allen is a  33 y.o.  right-handed white male who was sent by Dr. Georges for a neurologic consultation regarding peripheral neuropathy.  The patient actually had an appointment in January but it was at a time there was a huge snowfall and he wasn't able to get out on the road.  The patient had numbness in the fingers and toes and was found to have hypothyroidism and with treatment his numbness has disappeared however he has developed additional symptoms.  He described fasciculations in the left forearm in May of this year after weed eating.  He then developed fasciculations all over he states.  He feels fatigued and feels weak all over but hasn't lost any weight.  He states this has been present about one year.  He did gain some weight prior to diagnosis of hypothyroidism.  He has a history of non-Hodgkin's lymphoma diagnosed in 2011 and was treated with surgery radiation and CHOP which includes oncovin (vincristine).  He denies trouble swallowing.  He denies bowel or bladder trouble.  He currently denies numbness.    No history of significant head trauma spine trauma meningitis seizures or stroke.  About 5 years ago he had some \"auras\" with lightheadedness and seeing halos in one eye or the other or both followed by headache about 30 minutes later.  These were diagnosed as probable migraines and they have become less frequent more recently.  There is positive family history of stroke in the paternal grandmother in her 70s and she also apparently had an aneurysm.  No one with a brain tumor.  A couple of his cousins have history of migraines and one cousin has rheumatoid arthritis.    He denies snoring or legs kicking while sleeping.  He says he sleeps pretty good waking up one or 2 times but can fall back to sleep easily.  He stays in bed about 7 hours.  Some days he feels drained when he awakens but most days he doesn't feel tired when he wakes up.  He does indicate a " long-standing history of anxiety dating back to childhood.    As a consequence of his radiation therapy he developed superior vena cava syndrome and eventually he had a left internal jugular to right atrium bypass which did correct the problem.  This was done at [facility].  A couple of procedures at  were not successful he states.        Past Medical History:   Diagnosis Date   • Allergic    • Anemia    • Anxiety    • Current use of long term anticoagulation    • Diffuse large B cell lymphoma (CMS/HCC)     in remission s/p chemo and radiation   • GERD (gastroesophageal reflux disease)    • Hyperhidrosis    • Hypothyroidism    • Insomnia    • Low testosterone in male    • Lymphoma (CMS/HCC)     lymphoma   • Pericarditis    • Superior vena cava syndrome     has blockage to the brachiocephalics both sides   • TIA (transient ischemic attack)          Past Surgical History:   Procedure Laterality Date   • BRACHIOCEPHALIC VEIN ANGIOPLASTY / STENTING Bilateral     no stents were able to be placed   • BYPASS GRAFT  05/17/2018    Heart surgery bypass; Left IJ to right atrium   • CHEST TUBE INSERTION Right     after thoracentesis   • EXCISION MASS HEAD/NECK Left 2/27/2017    Procedure: Excisional biopsy of left occipital lymph node;  Surgeon: Catalino Damon MD;  Location: [facility];  Service:    • OTHER SURGICAL HISTORY      SVC reconstruction/graft   • RIB BIOPSY Right 2011    MEDIASTINUM   • SKIN BIOPSY      cyst on left shoulder, benign   • THORACENTESIS Right     thora drain left in for 6 months           Current Outpatient Prescriptions:   •  aspirin 81 MG tablet, Take 1 tablet by mouth Daily., Disp: 90 tablet, Rfl: 3  •  diphenhydrAMINE (BENADRYL) 50 MG tablet, Take 1 tablet by mouth At Night As Needed for Itching., Disp: 1 tablet, Rfl: 0  •  enoxaparin (LOVENOX) 40 MG/0.4ML solution syringe, Inject 0.4 mL under the skin into the appropriate area as directed Every 12 (Twelve) Hours., Disp: 22 mL, Rfl:  0  •  levothyroxine (SYNTHROID) 88 MCG tablet, Take 1 tablet by mouth Daily., Disp: 90 tablet, Rfl: 1  •  omeprazole (priLOSEC) 20 MG capsule, Take 1 capsule by mouth Daily., Disp: 30 capsule, Rfl: 0  •  meclizine (ANTIVERT) 50 MG tablet, Take 0.5 tablets by mouth 3 (three) times a day as needed for dizziness for up to 20 doses., Disp: 20 tablet, Rfl: 0  •  predniSONE (DELTASONE) 50 MG tablet, Take 1 tablet by mouth Daily., Disp: 3 tablet, Rfl: 0      Family History   Problem Relation Age of Onset   • Cancer Maternal Grandfather    • Cancer Paternal Grandmother    • Diabetes Paternal Grandmother    • Cancer Paternal Grandfather          Social History     Social History   • Marital status: Single     Spouse name: N/A   • Number of children: N/A   • Years of education: College     Occupational History   • Physical therapy tech Other [employer]     Social History Main Topics   • Smoking status: Never Smoker   • Smokeless tobacco: Never Used   • Alcohol use Yes      Comment: occasional, once a month   • Drug use: No   • Sexual activity: Yes     Partners: Male     Other Topics Concern   • Not on file     Social History Narrative    Lives at home with his dog.  Works as a Physical Therapist.           Allergies   Allergen Reactions   • Contrast Dye Other (See Comments)     Seeing halos, migraines   • Zoloft [Sertraline Hcl]          Pain Scale: Nonpainful        ROS:  Review of Systems   Constitutional: Positive for chills, fatigue and fever.   HENT: Positive for congestion and sinus pressure.    Eyes: Positive for photophobia.   Respiratory: Positive for chest tightness and shortness of breath.    Cardiovascular: Negative for chest pain, palpitations and leg swelling.   Gastrointestinal: Negative for constipation, diarrhea, nausea and vomiting.   Endocrine: Negative for cold intolerance, heat intolerance and polydipsia.   Genitourinary: Negative for decreased urine volume, difficulty urinating, frequency and  "urgency.   Musculoskeletal: Positive for neck stiffness. Negative for back pain, gait problem and joint swelling.   Skin: Negative for color change, rash and wound.   Allergic/Immunologic: Negative for environmental allergies, food allergies and immunocompromised state.   Neurological: Positive for tremors, weakness and light-headedness. Negative for dizziness, seizures, syncope, facial asymmetry, speech difficulty, numbness and headaches.   Hematological: Negative for adenopathy. Does not bruise/bleed easily.   Psychiatric/Behavioral: Negative for agitation, behavioral problems, confusion, decreased concentration, dysphoric mood, hallucinations, self-injury, sleep disturbance and suicidal ideas. The patient is nervous/anxious. The patient is not hyperactive.            Physical Exam:  Vitals:    09/13/18 0816   BP: 118/74   Pulse: 105   SpO2: 97%   Weight: 104 kg (230 lb)   Height: 180.3 cm (71\")     Body mass index is 32.08 kg/m².    Physical Exam  Gen.: Obese white male no acute distress  HEENT: Normocephalic no evidence of trauma.  Discs flat.  No A-V nicking.  Throat negative.  Neck: Supple.  No thyromegaly.  No cervical bruits.  Radial pulses were strong and simultaneous.  Heart: Regular rate and rhythm without murmurs.  No pedal edema.  Extremities show no hair loss pattern      Neurological Exam:   Mental status: Awake alert oriented and conversant without evidence of an affective disorder thought disorder delusions or hallucinations.  He appears somewhat anxious.  HCF: No aphasia or apraxia or dysarthria.  Recent and remote memory intact.  Knowledge of recent events intact.  Cranial nerves: 2-12 intact.  No tongue atrophy or fasciculation.  Motor: Normal tone and bulk with full power in all muscles tested in the arms and legs.  Some minimal tremor or tremulousness is seen in the fingers at times.  Reflexes: +2 diffusely.  One beat clonus at the ankles.  Toe signs are downgoing bilaterally.  Sensory: Normal " light touch and pinprick throughout the arms and legs.  Vibration and position senses are intact in the feet.  Cerebellar: Finger to nose rapid movement heel-to-shin normal  Gait and Station: Casual toe heel and tandem walk were normal.  No Romberg no drift        Results:      Lab Results   Component Value Date    GLUCOSE 106 (H) 06/19/2018    BUN 16 07/20/2018    CREATININE 1.05 07/20/2018    EGFRIFNONA 81 07/20/2018    EGFRIFAFRI 99 07/20/2018    BCR 15.2 07/20/2018    CO2 23.4 07/20/2018    CALCIUM 9.1 07/20/2018    PROTENTOTREF 7.8 07/20/2018    ALBUMIN 5.20 07/20/2018    LABIL2 2.0 07/20/2018    AST 16 07/20/2018    ALT 34 07/20/2018       Lab Results   Component Value Date    WBC 5.44 06/19/2018    HGB 14.5 07/20/2018    HCT 44.9 07/20/2018    MCV 81.3 07/20/2018     07/20/2018         .No results found for: RPR      Lab Results   Component Value Date    TSH 3.120 07/20/2018    C6XGTNK 6.42 10/12/2017         No results found for: [id number]      No results found for: FOLATE      No results found for: HGBA1C            Assessment:   1.  Fasciculations most likely benign fasciculations.  2.  Peripheral neuropathy due to hypothyroidism now asymptomatic  3.  History of non-Hodgkin's lymphoma treated with radiation, chemotherapy including vincristine and surgery  4.  History of superior vena cava syndrome corrected with internal jugular toe right atrium bypass        Plan:  1.  EMG 1 arm 1 leg  2.  Will discuss when I see him for his EMG                          Dictated utilizing Dragon dictation.   Answers for HPI/ROS submitted by the patient on 9/9/2018   Shortness of breath  Chronicity: new  Onset: more than 1 month ago  Frequency: every several days  Progression since onset: waxing and waning  Episode duration: 3 days  abdominal pain: No  chest pain: No  claudication: No  coryza: Yes  ear pain: No  fever: No  headaches: Yes  leg pain: Yes  leg swelling: No  neck pain: Yes  orthopnea: No  PND:  No  rash: No  rhinorrhea: No  sore throat: No  sputum production: No  swollen glands: Yes  syncope: No  vomiting: No  wheezing: No  Aggravating factors: emotional upset, exercise, URIs, weather changes

## 2018-09-17 ENCOUNTER — TELEPHONE (OUTPATIENT)
Dept: CARDIOLOGY | Facility: CLINIC | Age: 33
End: 2018-09-17

## 2018-09-17 ENCOUNTER — OFFICE VISIT (OUTPATIENT)
Dept: CARDIOLOGY | Facility: CLINIC | Age: 33
End: 2018-09-17

## 2018-09-17 VITALS
WEIGHT: 234.2 LBS | HEART RATE: 96 BPM | SYSTOLIC BLOOD PRESSURE: 130 MMHG | BODY MASS INDEX: 32.79 KG/M2 | DIASTOLIC BLOOD PRESSURE: 88 MMHG | HEIGHT: 71 IN

## 2018-09-17 DIAGNOSIS — R06.83 SNORING: ICD-10-CM

## 2018-09-17 DIAGNOSIS — I87.1 SUPERIOR VENA CAVA SYNDROME: Primary | ICD-10-CM

## 2018-09-17 DIAGNOSIS — I42.8 OTHER CARDIOMYOPATHY (HCC): ICD-10-CM

## 2018-09-17 DIAGNOSIS — Z79.01 CURRENT USE OF LONG TERM ANTICOAGULATION: ICD-10-CM

## 2018-09-17 DIAGNOSIS — R00.2 PALPITATION: ICD-10-CM

## 2018-09-17 DIAGNOSIS — R40.0 DAYTIME SOMNOLENCE: ICD-10-CM

## 2018-09-17 PROCEDURE — 99204 OFFICE O/P NEW MOD 45 MIN: CPT | Performed by: INTERNAL MEDICINE

## 2018-09-17 PROCEDURE — 93000 ELECTROCARDIOGRAM COMPLETE: CPT | Performed by: INTERNAL MEDICINE

## 2018-09-17 NOTE — PROGRESS NOTES
Date of Office Visit: 2018  Encounter Provider: Lynda Serrato MD  Place of Service: [facility] CARDIOLOGY  Patient Name: Scott Allen  :1985    Chief complaint  Consult requested by Dr. Retana for evaluation and management superior vena caval obstruction and subsequent revascularization    History of Present Illness   The patient is a 33-year-old gentleman with history of non-Hodgkin's lymphoma treated in  with chemotherapy (CHOP) and radiation therapy.  He is followed by Dr. Mittal locally and is felt to be cured.  He developed bilateral upper extremity DVT and subclavian vein stenosis for which percutaneous revascularization was attempted several times.  This was unsuccessful and he developed subclavian vein syndrome.  He went to the [facility] in 2018 and had surgical vascular shunt placed from the left internal jugular vein to the right atrium.  He was placed on Lovenox and aspirin with plans to transition to Coumadin in the next several months.  However due to his work schedule he was not able to return by this.  He did develop pleural effusions and a pericardial effusion postoperatively that improved with the use of nonsteroidals.  He had a follow-up echocardiogram in 2018 that revealed low normal systolic function with an ejection fraction of 45-50% by 2-D imaging though with Definity ejection fraction was normal there was a small pericardial effusion with no evidence of pericardial constraint.  There was trivial mitral regurgitation.  He had a follow-up CT scan in 2018 of his chest that showed patent venous graft with mass in the superior mediastinum that was unchanged.  Right upper lobe nodule is felt to be stable.     Since last visit he states he is just now returning back to his usual activities.  He is a physical therapist.  He is active but not exercising.  He denies any chest pain, shortness of breath syncope near syncope.  He has  occasional palpitations described as a fluttering that lasts for a few seconds these are rare.      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   • TIA (transient ischemic attack)      Past Surgical History:   Procedure Laterality Date   • BRACHIOCEPHALIC VEIN ANGIOPLASTY / STENTING Bilateral     no stents were able to be placed   • BYPASS GRAFT  05/17/2018    Heart surgery bypass; Left IJ to right atrium   • CHEST TUBE INSERTION Right     after thoracentesis   • EXCISION MASS HEAD/NECK Left 2/27/2017    Procedure: Excisional biopsy of left occipital lymph node;  Surgeon: Catalino Damon MD;  Location: [facility];  Service:    • OTHER SURGICAL HISTORY      SVC reconstruction/graft   • RIB BIOPSY Right 2011    MEDIASTINUM   • SKIN BIOPSY      cyst on left shoulder, benign   • THORACENTESIS Right     thora drain left in for 6 months     Outpatient Medications Prior to Visit   Medication Sig Dispense Refill   • aspirin 81 MG tablet Take 1 tablet by mouth Daily. 90 tablet 3   • levothyroxine (SYNTHROID) 88 MCG tablet Take 1 tablet by mouth Daily. 90 tablet 1   • omeprazole (priLOSEC) 20 MG capsule Take 1 capsule by mouth Daily. 30 capsule 0   • diphenhydrAMINE (BENADRYL) 50 MG tablet Take 1 tablet by mouth At Night As Needed for Itching. 1 tablet 0   • enoxaparin (LOVENOX) 40 MG/0.4ML solution syringe Inject 0.4 mL under the skin into the  appropriate area as directed Every 12 (Twelve) Hours. 22 mL 0   • meclizine (ANTIVERT) 50 MG tablet Take 0.5 tablets by mouth 3 (three) times a day as needed for dizziness for up to 20 doses. 20 tablet 0   • predniSONE (DELTASONE) 50 MG tablet Take 1 tablet by mouth Daily. (Patient taking differently: Take 50 mg by mouth Daily. As needed) 3 tablet 0     No facility-administered medications prior to visit.        Allergies as of 09/17/2018 - Reviewed 09/17/2018   Allergen Reaction Noted   • Contrast dye Other (See Comments) 05/31/2018   • Zoloft [sertraline hcl]  09/29/2017     Social History     Social History   • Marital status: Single     Spouse name: N/A   • Number of children: N/A   • Years of education: College     Occupational History   • Physical therapy [employer]     Social History Main Topics   • Smoking status: Never Smoker   • Smokeless tobacco: Never Used      Comment: Daily caffeine use   • Alcohol use Yes      Comment: occasional, once a month   • Drug use: No   • Sexual activity: Yes     Partners: Male     Other Topics Concern   • Not on file     Social History Narrative    Lives at home with his dog.  Works as a Physical Therapist.       Family History   Problem Relation Age of Onset   • Cancer Maternal Grandfather    • Cancer Paternal Grandmother    • Diabetes Paternal Grandmother    • Asthma Paternal Grandmother    • Stroke Paternal Grandmother    • Hypertension Paternal Grandmother    • Aneurysm Paternal Grandmother    • Cancer Paternal Grandfather    • Diabetes Father    • Sudden death Maternal Grandmother    • Cancer Maternal Grandmother      Review of Systems   Constitution: Negative for fever, malaise/fatigue, weight gain and weight loss.   HENT: Negative for ear pain, hearing loss, nosebleeds and sore throat.    Eyes: Negative for double vision, pain, vision loss in left eye and vision loss in right eye.   Cardiovascular:        See history of present illness.   Respiratory:  "Positive for shortness of breath. Negative for cough, sleep disturbances due to breathing, snoring and wheezing.    Endocrine: Negative for cold intolerance, heat intolerance and polyuria.   Skin: Negative for itching, poor wound healing and rash.   Musculoskeletal: Negative for joint pain, joint swelling and myalgias.   Gastrointestinal: Negative for abdominal pain, diarrhea, hematochezia, nausea and vomiting.   Genitourinary: Negative for hematuria and hesitancy.   Neurological: Negative for numbness, paresthesias and seizures.   Psychiatric/Behavioral: Negative for depression. The patient is nervous/anxious.         Objective:     Vitals:    09/17/18 1336 09/17/18 1337   BP: 124/80 130/88   BP Location: Right arm Left arm   Pulse: 96    Weight: 106 kg (234 lb 3.2 oz)    Height: 180.3 cm (71\")      Body mass index is 32.66 kg/m².    Physical Exam   Constitutional: He is oriented to person, place, and time. He appears well-developed and well-nourished.   Obese   HENT:   Head: Normocephalic.   Nose: Nose normal.   Mouth/Throat: Oropharynx is clear and moist.   Eyes: Pupils are equal, round, and reactive to light. Conjunctivae and EOM are normal. Right eye exhibits no discharge. No scleral icterus.   Neck: Normal range of motion. Neck supple. No JVD present. No thyromegaly present.   Cardiovascular: Normal rate, regular rhythm, normal heart sounds and intact distal pulses.  Exam reveals no gallop and no friction rub.    No murmur heard.  Pulses:       Carotid pulses are 2+ on the right side, and 2+ on the left side.       Radial pulses are 2+ on the right side, and 2+ on the left side.        Femoral pulses are 2+ on the right side, and 2+ on the left side.       Popliteal pulses are 2+ on the right side, and 2+ on the left side.        Dorsalis pedis pulses are 2+ on the right side, and 2+ on the left side.        Posterior tibial pulses are 2+ on the right side, and 2+ on the left side.   Pulmonary/Chest: Effort " normal and breath sounds normal. No respiratory distress. He has no wheezes. He has no rales.   Abdominal: Soft. Bowel sounds are normal. He exhibits no distension. There is no hepatosplenomegaly. There is no tenderness. There is no rebound.   Musculoskeletal: Normal range of motion. He exhibits no edema or tenderness.   Neurological: He is alert and oriented to person, place, and time.   Skin: Skin is warm and dry. No rash noted. No erythema.   Psychiatric: He has a normal mood and affect. His behavior is normal. Judgment and thought content normal.   Vitals reviewed.    Lab Review:     ECG 12 Lead  Date/Time: 9/17/2018 1:37 PM  Performed by: ANTONIO RIVERA  Authorized by: ANTONIO RIVERA   Comparison: compared with previous ECG   Similar to previous ECG  Rhythm: sinus rhythm  QRS axis: left  Other findings: LAE and PRWP  Clinical impression: abnormal ECG          Assessment:       Diagnosis Plan   1. Superior vena cava syndrome     2. Snoring  Home Sleep Study   3. Daytime somnolence  Home Sleep Study   4. Palpitation  ECG 12 Lead   5. Current use of long term anticoagulation     6. Other cardiomyopathy (CMS/HCC)       Plan:       1.  Subclavian vein obstruction/syndrome.  Status post left internal jugular to right atrial revascularization.  Clinically much improved with resolution of venous collaterals and edema.  His lightheaded episodes have resolved.  We'll need to transition to Coumadin but will contact Dr. Librado Arellano at [facility] to determine goal for his INR  2.  Pericardial effusion, resolved with nonsteroidals.  No evidence of pericardial constraint at this point.  Obviously with this history of chest wall radiation this will need to be monitored clinically.  3.  Questionable cardiomyopathy.  I reviewed the echocardiogram.  With Definity the ejection fraction appears to be near normal.  Will monitor his blood pressure closely and check for sleep apnea.  Plan on repeat echocardiographic imaging in 6 months  to years depending on his clinical course  4.  Probable sleep apnea.  He snores at night wakes up at times gasping and is very fatigued during the day.  We'll check a home sleep study.  5.  History of non-Hodgkin's lymphoma with chest wall radiation.  6.  History of bilateral upper extremity deep venous thrombosis  7.  Hypothyroidism on Synthroid replacement therapy         Your medication list          Accurate as of 9/17/18 11:59 PM. If you have any questions, ask your nurse or doctor.               CONTINUE taking these medications      Instructions Last Dose Given Next Dose Due   aspirin 81 MG tablet      Take 1 tablet by mouth Daily.       diphenhydrAMINE 50 MG tablet  Commonly known as:  BENADRYL      Take 1 tablet by mouth At Night As Needed for Itching.       enoxaparin 40 MG/0.4ML solution syringe  Commonly known as:  LOVENOX      Inject 0.4 mL under the skin into the appropriate area as directed Every 12 (Twelve) Hours.       levothyroxine 88 MCG tablet  Commonly known as:  SYNTHROID      Take 1 tablet by mouth Daily.       meclizine 50 MG tablet  Commonly known as:  ANTIVERT      Take 0.5 tablets by mouth 3 (three) times a day as needed for dizziness for up to 20 doses.       omeprazole 20 MG capsule  Commonly known as:  priLOSEC      Take 1 capsule by mouth Daily.              Dictated utilizing Dragon dictation

## 2018-09-19 ENCOUNTER — OFFICE VISIT (OUTPATIENT)
Dept: FAMILY MEDICINE CLINIC | Facility: CLINIC | Age: 33
End: 2018-09-19

## 2018-09-19 VITALS
RESPIRATION RATE: 13 BRPM | OXYGEN SATURATION: 98 % | SYSTOLIC BLOOD PRESSURE: 128 MMHG | HEART RATE: 110 BPM | DIASTOLIC BLOOD PRESSURE: 86 MMHG | WEIGHT: 228 LBS | BODY MASS INDEX: 31.92 KG/M2 | HEIGHT: 71 IN

## 2018-09-19 DIAGNOSIS — E87.1 HYPONATREMIA: ICD-10-CM

## 2018-09-19 DIAGNOSIS — J32.9 CHRONIC SINUSITIS, UNSPECIFIED LOCATION: ICD-10-CM

## 2018-09-19 DIAGNOSIS — R79.89 LOW TESTOSTERONE IN MALE: ICD-10-CM

## 2018-09-19 DIAGNOSIS — F41.9 ANXIETY: ICD-10-CM

## 2018-09-19 DIAGNOSIS — E03.9 HYPOTHYROIDISM, UNSPECIFIED TYPE: Primary | ICD-10-CM

## 2018-09-19 DIAGNOSIS — D72.819 LEUKOPENIA, UNSPECIFIED TYPE: ICD-10-CM

## 2018-09-19 DIAGNOSIS — Z23 NEED FOR VACCINATION: ICD-10-CM

## 2018-09-19 LAB
BASOPHILS # BLD AUTO: 0.05 10*3/MM3 (ref 0–0.2)
BASOPHILS NFR BLD AUTO: 1 % (ref 0–1.5)
BUN SERPL-MCNC: 17 MG/DL (ref 6–20)
BUN/CREAT SERPL: 16.3 (ref 7–25)
CALCIUM SERPL-MCNC: 9.5 MG/DL (ref 8.6–10.5)
CHLORIDE SERPL-SCNC: 101 MMOL/L (ref 98–107)
CO2 SERPL-SCNC: 24.1 MMOL/L (ref 22–29)
CREAT SERPL-MCNC: 1.04 MG/DL (ref 0.76–1.27)
EOSINOPHIL # BLD AUTO: 0.28 10*3/MM3 (ref 0–0.7)
EOSINOPHIL NFR BLD AUTO: 5.4 % (ref 0.3–6.2)
ERYTHROCYTE [DISTWIDTH] IN BLOOD BY AUTOMATED COUNT: 16.1 % (ref 11.5–14.5)
GLUCOSE SERPL-MCNC: 93 MG/DL (ref 65–99)
HCT VFR BLD AUTO: 50.1 % (ref 40.4–52.2)
HGB BLD-MCNC: 15.7 G/DL (ref 13.7–17.6)
IMM GRANULOCYTES # BLD: 0.02 10*3/MM3 (ref 0–0.03)
IMM GRANULOCYTES NFR BLD: 0.4 % (ref 0–0.5)
LYMPHOCYTES # BLD AUTO: 1.22 10*3/MM3 (ref 0.9–4.8)
LYMPHOCYTES NFR BLD AUTO: 23.7 % (ref 19.6–45.3)
MCH RBC QN AUTO: 25.3 PG (ref 27–32.7)
MCHC RBC AUTO-ENTMCNC: 31.3 G/DL (ref 32.6–36.4)
MCV RBC AUTO: 80.8 FL (ref 79.8–96.2)
MONOCYTES # BLD AUTO: 0.4 10*3/MM3 (ref 0.2–1.2)
MONOCYTES NFR BLD AUTO: 7.8 % (ref 5–12)
NEUTROPHILS # BLD AUTO: 3.17 10*3/MM3 (ref 1.9–8.1)
NEUTROPHILS NFR BLD AUTO: 61.7 % (ref 42.7–76)
PLATELET # BLD AUTO: 176 10*3/MM3 (ref 140–500)
POTASSIUM SERPL-SCNC: 4.6 MMOL/L (ref 3.5–5.2)
RBC # BLD AUTO: 6.2 10*6/MM3 (ref 4.6–6)
SODIUM SERPL-SCNC: 140 MMOL/L (ref 136–145)
TSH SERPL DL<=0.005 MIU/L-ACNC: 2.56 MIU/ML (ref 0.27–4.2)
WBC # BLD AUTO: 5.14 10*3/MM3 (ref 4.5–10.7)

## 2018-09-19 PROCEDURE — 90471 IMMUNIZATION ADMIN: CPT | Performed by: FAMILY MEDICINE

## 2018-09-19 PROCEDURE — 99214 OFFICE O/P EST MOD 30 MIN: CPT | Performed by: FAMILY MEDICINE

## 2018-09-19 PROCEDURE — 90670 PCV13 VACCINE IM: CPT | Performed by: FAMILY MEDICINE

## 2018-09-19 RX ORDER — BUSPIRONE HYDROCHLORIDE 10 MG/1
10 TABLET ORAL 3 TIMES DAILY PRN
Qty: 90 TABLET | Refills: 1 | Status: SHIPPED | OUTPATIENT
Start: 2018-09-19 | End: 2018-10-23

## 2018-09-19 NOTE — PATIENT INSTRUCTIONS
Hypothyroidism  Hypothyroidism is a disorder of the thyroid. The thyroid is a large gland that is located in the lower front of the neck. The thyroid releases hormones that control how the body works. With hypothyroidism, the thyroid does not make enough of these hormones.  What are the causes?  Causes of hypothyroidism may include:  · Viral infections.  · Pregnancy.  · Your own defense system (immune system) attacking your thyroid.  · Certain medicines.  · Birth defects.  · Past radiation treatments to your head or neck.  · Past treatment with radioactive iodine.  · Past surgical removal of part or all of your thyroid.  · Problems with the gland that is located in the center of your brain (pituitary).    What are the signs or symptoms?  Signs and symptoms of hypothyroidism may include:  · Feeling as though you have no energy (lethargy).  · Inability to tolerate cold.  · Weight gain that is not explained by a change in diet or exercise habits.  · Dry skin.  · Coarse hair.  · Menstrual irregularity.  · Slowing of thought processes.  · Constipation.  · Sadness or depression.    How is this diagnosed?  Your health care provider may diagnose hypothyroidism with blood tests and ultrasound tests.  How is this treated?  Hypothyroidism is treated with medicine that replaces the hormones that your body does not make. After you begin treatment, it may take several weeks for symptoms to go away.  Follow these instructions at home:  · Take medicines only as directed by your health care provider.  · If you start taking any new medicines, tell your health care provider.  · Keep all follow-up visits as directed by your health care provider. This is important. As your condition improves, your dosage needs may change. You will need to have blood tests regularly so that your health care provider can watch your condition.  Contact a health care provider if:  · Your symptoms do not get better with treatment.  · You are taking thyroid  replacement medicine and:  ? You sweat excessively.  ? You have tremors.  ? You feel anxious.  ? You lose weight rapidly.  ? You cannot tolerate heat.  ? You have emotional swings.  ? You have diarrhea.  ? You feel weak.  Get help right away if:  · You develop chest pain.  · You develop an irregular heartbeat.  · You develop a rapid heartbeat.  This information is not intended to replace advice given to you by your health care provider. Make sure you discuss any questions you have with your health care provider.  Document Released: 12/18/2006 Document Revised: 05/25/2017 Document Reviewed: 05/05/2015  [employer] Interactive Patient Education © 2018 [employer]

## 2018-09-19 NOTE — PROGRESS NOTES
Subjective   Scott Allen is a 33 y.o. male.     Chief Complaint   Patient presents with   • Hypothyroidism     Follow Up   • Anxiety       HPI     Hypothyroidism:  -levothyroxine dose increased from 75 to 88 mcg daily about 2 months ago  -he still feels fatigued   -no constipation or skin changes  -Endocrinology appt scheduled in Feb. 219  -Pt requests that we check testosterone levels as well as TSH today since he has a hx of low testosterone    He also had mild hyponatremia and leukopenia on last set of labs from 2 months ago and agrees to get follow up labs today    Anxiety:  -severe, interfering with his daily life  -health insurance changes are currently underway, but he plans to establish care with a mental health provider next month as previously referred  - he has not tolerated Sertraline due to worsening anxiety and muscle twitching in the past  - he has been advised to avoid SSRIs by the psychiatrist at the [facility] where he had vena cava reconstruction surgery  -he prefers to avoid benzodiazepines due to the risks for sedation and dependance  -He still worries about the possiblity of ALS, though meeting with the Neurologist did help allay his fears.  Work up underway with Neurology for fasiculations.  EMG scheduled in about 6 weeks.    Pt requests referral to ENT for chronic sinus pressure and discomfort.         He has established care with Cardiology, Dr. Serrato, who is planning to help him transition from lovenox to coumadin soon.        Review of Systems   Constitutional: Positive for fatigue. Negative for fever.   HENT: Positive for congestion, sinus pain and sinus pressure. Negative for sore throat, trouble swallowing and voice change.    Respiratory: Negative for cough, chest tightness, shortness of breath and wheezing.    Cardiovascular: Negative for chest pain and palpitations.   Gastrointestinal: Negative for abdominal pain, constipation, diarrhea, nausea and vomiting.   Musculoskeletal:  Positive for myalgias (and fasiculations throughout body, occuring at random). Negative for arthralgias.   Skin: Negative for rash and wound.   Neurological: Positive for numbness (intermittently in arms and legs, lasting only a few seconds). Negative for dizziness and weakness.   Hematological: Negative for adenopathy. Does not bruise/bleed easily.   Psychiatric/Behavioral: Negative for dysphoric mood and suicidal ideas. The patient is nervous/anxious.        The following portions of the patient's history were reviewed and updated as appropriate: allergies, current medications, past family history, past medical history, past social history, past surgical history and problem list.    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   • TIA (transient ischemic attack)      Past Surgical History:   Procedure Laterality Date   • BRACHIOCEPHALIC VEIN ANGIOPLASTY / STENTING Bilateral     no stents were able to be placed   • BYPASS GRAFT  05/17/2018    Heart surgery bypass; Left IJ to right atrium   • CHEST TUBE INSERTION Right     after thoracentesis   • EXCISION MASS HEAD/NECK Left 2/27/2017    Procedure: Excisional biopsy of left occipital lymph node;  Surgeon: Catalino Damon MD;  Location: [facility];  Service:    • OTHER SURGICAL HISTORY      SVC reconstruction/graft   • RIB BIOPSY Right 2011    MEDIASTINUM   • SKIN BIOPSY      cyst on left  shoulder, benign   • THORACENTESIS Right     thora drain left in for 6 months     Family History   Problem Relation Age of Onset   • Cancer Maternal Grandfather    • Cancer Paternal Grandmother    • Diabetes Paternal Grandmother    • Asthma Paternal Grandmother    • Stroke Paternal Grandmother    • Hypertension Paternal Grandmother    • Aneurysm Paternal Grandmother    • Cancer Paternal Grandfather    • Diabetes Father    • Sudden death Maternal Grandmother    • Cancer Maternal Grandmother      Social History     Social History   • Marital status: Single     Spouse name: N/A   • Number of children: N/A   • Years of education: College     Occupational History   • Physical therapy [employer]     Social History Main Topics   • Smoking status: Never Smoker   • Smokeless tobacco: Never Used      Comment: Daily caffeine use   • Alcohol use Yes      Comment: occasional, once a month   • Drug use: No   • Sexual activity: Yes     Partners: Male     Other Topics Concern   • Not on file     Social History Narrative    Lives at home with his dog.  Works as a Physical Therapist.          Allergies   Allergen Reactions   • Contrast Dye Other (See Comments)     Seeing halos, migraines   • Zoloft [Sertraline Hcl]         Outpatient Medications Prior to Visit   Medication Sig Dispense Refill   • aspirin 81 MG tablet Take 1 tablet by mouth Daily. 90 tablet 3   • enoxaparin (LOVENOX) 40 MG/0.4ML solution syringe Inject 0.4 mL under the skin into the appropriate area as directed Every 12 (Twelve) Hours. 22 mL 0   • levothyroxine (SYNTHROID) 88 MCG tablet Take 1 tablet by mouth Daily. 90 tablet 1   • omeprazole (priLOSEC) 20 MG capsule Take 1 capsule by mouth Daily. 30 capsule 0   • diphenhydrAMINE (BENADRYL) 50 MG tablet Take 1 tablet by mouth At Night As Needed for Itching. 1 tablet 0   • meclizine (ANTIVERT) 50 MG tablet Take 0.5 tablets by mouth 3 (three) times a day as needed for dizziness for up to 20 doses. 20  tablet 0     No facility-administered medications prior to visit.        Objective     Vitals:    09/19/18 0805   BP: 128/86   Pulse: 110-->98 on exam   Resp: 13   SpO2: 98%       Physical Exam   Constitutional: Vital signs are normal. He appears well-developed and well-nourished. No distress.   HENT:   Head: Normocephalic and atraumatic.   Cardiovascular: Normal rate, regular rhythm, S1 normal, S2 normal and normal heart sounds.  Exam reveals no gallop and no friction rub.    No murmur heard.  Pulses:       Radial pulses are 2+ on the right side, and 2+ on the left side.   Pulmonary/Chest: Effort normal and breath sounds normal. No accessory muscle usage. He has no decreased breath sounds. He has no wheezes. He has no rhonchi. He has no rales. He exhibits no tenderness.   Abdominal: Soft. Bowel sounds are normal. He exhibits no distension. There is no hepatosplenomegaly. There is no tenderness. There is no rebound and no guarding.   Neurological: He has normal strength. Gait normal.   Reflex Scores:       Bicep reflexes are 2+ on the right side and 2+ on the left side.  Skin: Skin is warm and dry. No cyanosis. Nails show no clubbing.   Well healed sternotomy scar     Psychiatric: His mood appears anxious. His speech is rapid and/or pressured.   Nursing note and vitals reviewed.      ASSESSMENT/PLAN       Problem List Items Addressed This Visit        Endocrine    Hypothyroidism - Primary    Relevant Orders    TSH  Continue levothyroxine 88 mcg daily      Low testosterone in male    Relevant Orders    Check Testosterone, Free, Total  Follow up w/Endocrinology as scheduled       Other    Anxiety    Relevant Medications    Start busPIRone (BUSPAR) 10 MG tablet BID scheduled (plus mid-day third dose if needed)  Pt strongly encouraged to establish care with Psychiatry       Other Visit Diagnoses     Chronic sinusitis, unspecified location        Relevant Orders    Ambulatory Referral to ENT (Otolaryngology)     Hyponatremia        Relevant Orders    Basic metabolic panel    Leukopenia, unspecified type        Relevant Orders    CBC & Differential    Need for vaccination        Relevant Orders    Pneumococcal Conjugate Vaccine 13-Valent All (PCV13)            Patient Instructions   Hypothyroidism  Hypothyroidism is a disorder of the thyroid. The thyroid is a large gland that is located in the lower front of the neck. The thyroid releases hormones that control how the body works. With hypothyroidism, the thyroid does not make enough of these hormones.  What are the causes?  Causes of hypothyroidism may include:  · Viral infections.  · Pregnancy.  · Your own defense system (immune system) attacking your thyroid.  · Certain medicines.  · Birth defects.  · Past radiation treatments to your head or neck.  · Past treatment with radioactive iodine.  · Past surgical removal of part or all of your thyroid.  · Problems with the gland that is located in the center of your brain (pituitary).    What are the signs or symptoms?  Signs and symptoms of hypothyroidism may include:  · Feeling as though you have no energy (lethargy).  · Inability to tolerate cold.  · Weight gain that is not explained by a change in diet or exercise habits.  · Dry skin.  · Coarse hair.  · Menstrual irregularity.  · Slowing of thought processes.  · Constipation.  · Sadness or depression.    How is this diagnosed?  Your health care provider may diagnose hypothyroidism with blood tests and ultrasound tests.  How is this treated?  Hypothyroidism is treated with medicine that replaces the hormones that your body does not make. After you begin treatment, it may take several weeks for symptoms to go away.  Follow these instructions at home:  · Take medicines only as directed by your health care provider.  · If you start taking any new medicines, tell your health care provider.  · Keep all follow-up visits as directed by your health care provider. This is important.  As your condition improves, your dosage needs may change. You will need to have blood tests regularly so that your health care provider can watch your condition.  Contact a health care provider if:  · Your symptoms do not get better with treatment.  · You are taking thyroid replacement medicine and:  ? You sweat excessively.  ? You have tremors.  ? You feel anxious.  ? You lose weight rapidly.  ? You cannot tolerate heat.  ? You have emotional swings.  ? You have diarrhea.  ? You feel weak.  Get help right away if:  · You develop chest pain.  · You develop an irregular heartbeat.  · You develop a rapid heartbeat.  This information is not intended to replace advice given to you by your health care provider. Make sure you discuss any questions you have with your health care provider.  Document Released: 12/18/2006 Document Revised: 05/25/2017 Document Reviewed: 05/05/2015  Elsevier Interactive Patient Education © 2018 [employer]      Return in about 1 month (around 10/19/2018).      Melina Kothari MD  09/19/18

## 2018-09-21 ENCOUNTER — TELEPHONE (OUTPATIENT)
Dept: FAMILY MEDICINE CLINIC | Facility: CLINIC | Age: 33
End: 2018-09-21

## 2018-09-21 DIAGNOSIS — I87.1 SUPERIOR VENA CAVA SYNDROME: ICD-10-CM

## 2018-09-21 DIAGNOSIS — T82.9XXD: ICD-10-CM

## 2018-09-21 LAB
TESTOST FREE SERPL-MCNC: 11.8 PG/ML (ref 8.7–25.1)
TESTOST SERPL-MCNC: 345 NG/DL (ref 264–916)

## 2018-09-21 NOTE — TELEPHONE ENCOUNTER
Called and spoke with patient to let him know his labs look good.     ----- Message from Melina Kothari MD sent at 9/21/2018  9:54 AM EDT -----  Please contact Scott Allen and let pt know all labs looked good.

## 2018-09-21 NOTE — TELEPHONE ENCOUNTER
----- Message from Melina Kothari MD sent at 9/21/2018  9:54 AM EDT -----  Please contact Scott Allen and let pt know all labs looked good.

## 2018-09-23 PROBLEM — R06.83 SNORING: Status: ACTIVE | Noted: 2018-09-23

## 2018-09-23 PROBLEM — I42.9 MYOCARDIOPATHY (HCC): Status: ACTIVE | Noted: 2018-09-23

## 2018-09-23 PROBLEM — R00.2 PALPITATION: Status: ACTIVE | Noted: 2018-09-23

## 2018-09-23 PROBLEM — R40.0 DAYTIME SOMNOLENCE: Status: ACTIVE | Noted: 2018-09-23

## 2018-09-25 ENCOUNTER — TELEPHONE (OUTPATIENT)
Dept: FAMILY MEDICINE CLINIC | Facility: CLINIC | Age: 33
End: 2018-09-25

## 2018-09-25 NOTE — TELEPHONE ENCOUNTER
He needs to follow up with Dr. Kothari or we can him to psychiatry. He can go to [facility] if he is in between insurance.

## 2018-09-25 NOTE — TELEPHONE ENCOUNTER
Patient left a  asking for some advice about taking his Buspar. He is having issues with extreme nausea & dizziness when he takes it. He reports the he has tried taking it with and without food, in the morning and at night, but nothing seems to be working.

## 2018-09-26 NOTE — TELEPHONE ENCOUNTER
Called and spoke with patient to let him know Dr. Escobar advises coming in to see Dr. Kothari next week and if he need to see beverley before then [facility] takes patients without insurance.

## 2018-10-15 DIAGNOSIS — T82.9XXD: ICD-10-CM

## 2018-10-15 DIAGNOSIS — I87.1 SUPERIOR VENA CAVA SYNDROME: ICD-10-CM

## 2018-10-15 DIAGNOSIS — Z79.01 ANTICOAGULATED ON COUMADIN: Primary | ICD-10-CM

## 2018-10-15 RX ORDER — WARFARIN SODIUM 5 MG/1
TABLET ORAL
Qty: 60 TABLET | Refills: 1 | Status: SHIPPED | OUTPATIENT
Start: 2018-10-15 | End: 2019-02-08 | Stop reason: SDUPTHER

## 2018-10-16 ENCOUNTER — TELEPHONE (OUTPATIENT)
Dept: PHARMACY | Facility: HOSPITAL | Age: 33
End: 2018-10-16

## 2018-10-19 ENCOUNTER — TELEPHONE (OUTPATIENT)
Dept: PHARMACY | Facility: HOSPITAL | Age: 33
End: 2018-10-19

## 2018-10-23 ENCOUNTER — ANTICOAGULATION VISIT (OUTPATIENT)
Dept: PHARMACY | Facility: HOSPITAL | Age: 33
End: 2018-10-23

## 2018-10-23 DIAGNOSIS — Z79.01 ANTICOAGULATED ON COUMADIN: ICD-10-CM

## 2018-10-23 PROBLEM — I82.409 DVT (DEEP VENOUS THROMBOSIS) (HCC): Status: ACTIVE | Noted: 2018-10-23

## 2018-10-23 LAB
INR PPP: 2.2 (ref 0.91–1.09)
PROTHROMBIN TIME: 26.4 SECONDS (ref 10–13.8)

## 2018-10-23 PROCEDURE — 85610 PROTHROMBIN TIME: CPT

## 2018-10-23 PROCEDURE — G0463 HOSPITAL OUTPT CLINIC VISIT: HCPCS

## 2018-10-23 PROCEDURE — 36416 COLLJ CAPILLARY BLOOD SPEC: CPT

## 2018-10-23 NOTE — PROGRESS NOTES
Anticoagulation Clinic Progress Note  Anticoagulation Summary  As of 10/23/2018    INR goal:   2.0-3.0   TTR:   --   Today's INR:   2.2   Warfarin maintenance plan:   5 mg every day   Weekly warfarin total:   35 mg   Plan last modified:   Kirsten Talbert RPH (10/23/2018)   Next INR check:   10/30/2018   Target end date:   Indefinite    Indications    Anticoagulated on Coumadin [Z51.81  Z79.01]  DVT (deep venous thrombosis) (CMS/HCC) [I82.409] [I82.409]             Anticoagulation Episode Summary     INR check location:       Preferred lab:       Send INR reminders to:   [facility] [place]    Comments:         Anticoagulation Care Providers     Provider Role Specialty Phone number    Lynda Serrato MD Referring Cardiology [phone]    Kirsten Talbert RPH Responsible Pharmacy             Drug interactions: no new meds  Diet: consistent    Clinic Interview:  Patient Findings     Positives:   Change in medications    Negatives:   Signs/symptoms of thrombosis, Signs/symptoms of bleeding,   Laboratory test error suspected, Change in health, Change in alcohol use,   Change in activity, Upcoming invasive procedure, Emergency department   visit, Upcoming dental procedure, Missed doses, Extra doses, Change in   diet/appetite, Hospital admission, Bruising, Other complaints    Comments:   Off of lovenox a week ago      Clinical Outcomes     Negatives:   Major bleeding event, Thromboembolic event,   Anticoagulation-related hospital admission, Anticoagulation-related ED   visit, Anticoagulation-related fatality    Comments:   Off of lovenox a week ago        Education:  Scott Allen is a new start in the [facility]. We discussed the followin) Warfarin's indication, mechanism, and dosing  2) Enforced the importance of taking warfarin as instructed and at the same time every day, preferably in the evening so that we can make dose adjustments more easily following subsequent clinic visits  3) What he  should do about a missed dose; pts can take missed doses within about 12 hours of their usual scheduled dose, but he was instructed on the importance of not doubling up on doses unless told to do so by the [facility]  4) Explained possible side effects of warfarin therapy, including increased risk of bleeding, s/sx of bleeding and s/sx of any additional clots/PE/CVA.   5) Discussed monitoring of warfarin, the INR, goal INR range, and the frequency of monitoring  6) Reviewed drug/food/tobacco/EtOH interactions and provided written information covering these topics in more detail, explaining that green, leafy vegetables interact most heavily with warfarin  7) Instructed the pt not to take or discontinue any medications without informing his physician/pharmacist and reminded him to inform us of any dietary changes, as well  8) Explained that he would be coming into the clinic more frequently in these first few weeks of therapy as we try to adjust his dose and achieve a therapeutic INR x 2 consecutive readings. Once that is achieved, patient will follow up in clinic every 4 weeks, on average.    He stated no problems with transportation or scheduling clinic appts in this manner. he expressed understanding of the information provided and has no additional questions at this time.    Scott Allen was presented with a copy of the Patients Rights and Responsibilities. he expressed verbal consent and agreement to receive care in the [facility] under the current collaborative care agreement with [facility].       INR History:  Previous INR data from [facility] is recorded in [facility] and scanned into the patient's chart in [facility]. These results have been analyzed and reviewed.      Plan:  1. INR is Therapeutic today- see above in Anticoagulation Summary.   Will instruct Scott Allen to Continue their warfarin regimen- see above in Anticoagulation Summary.  2.  Follow up in 1 week  3. Patient declines warfarin refills.  4. Verbal and written information provided. Patient expresses understanding and has no further questions at this time.    Kirsten Talbert RP

## 2018-10-30 ENCOUNTER — APPOINTMENT (OUTPATIENT)
Dept: PHARMACY | Facility: HOSPITAL | Age: 33
End: 2018-10-30

## 2018-10-30 ENCOUNTER — ANTICOAGULATION VISIT (OUTPATIENT)
Dept: PHARMACY | Facility: HOSPITAL | Age: 33
End: 2018-10-30

## 2018-10-30 ENCOUNTER — TELEPHONE (OUTPATIENT)
Dept: PHARMACY | Facility: HOSPITAL | Age: 33
End: 2018-10-30

## 2018-10-30 DIAGNOSIS — Z79.01 ANTICOAGULATED ON COUMADIN: ICD-10-CM

## 2018-10-30 LAB
INR PPP: 1.3 (ref 0.91–1.09)
PROTHROMBIN TIME: 15.7 SECONDS (ref 10–13.8)

## 2018-10-30 PROCEDURE — 36416 COLLJ CAPILLARY BLOOD SPEC: CPT

## 2018-10-30 PROCEDURE — 85610 PROTHROMBIN TIME: CPT

## 2018-10-30 PROCEDURE — G0463 HOSPITAL OUTPT CLINIC VISIT: HCPCS

## 2018-10-30 NOTE — PROGRESS NOTES
Anticoagulation Clinic Progress Note    Anticoagulation Summary  As of 10/30/2018    INR goal:   2.0-3.0   TTR:   3.1 % (5 d)   Today's INR:   1.3!   Warfarin maintenance plan:   7.5 mg on Thu, Sat; 5 mg all other days   Weekly warfarin total:   40 mg   Plan last modified:   Kirsten Talbert RPH (10/30/2018)   Next INR check:   11/5/2018   Target end date:   Indefinite    Indications    Anticoagulated on Coumadin [Z51.81  Z79.01]  DVT (deep venous thrombosis) (CMS/HCC) [I82.409] [I82.409]             Anticoagulation Episode Summary     INR check location:       Preferred lab:       Send INR reminders to:    [facility]    Comments:         Anticoagulation Care Providers     Provider Role Specialty Phone number    Lynda Serrato MD Referring Cardiology [phone]    Kirsten Talbert RPH Responsible Pharmacy           Drug interactions: has remained unchanged.  Diet: has remained unchanged.    Clinic Interview:  Patient Findings     Negatives:   Signs/symptoms of thrombosis, Signs/symptoms of bleeding,   Laboratory test error suspected, Change in health, Change in alcohol use,   Change in activity, Upcoming invasive procedure, Emergency department   visit, Upcoming dental procedure, Missed doses, Extra doses, Change in   medications, Change in diet/appetite, Hospital admission, Bruising, Other   complaints    Comments:   No reason for decrease.  Pt was on vacation in [place]   for almost a week.        Clinical Outcomes     Negatives:   Major bleeding event, Thromboembolic event,   Anticoagulation-related hospital admission, Anticoagulation-related ED   visit, Anticoagulation-related fatality    Comments:   No reason for decrease.  Pt was on vacation in [place]   for almost a week.          INR History:  Anticoagulation Monitoring 10/23/2018 10/30/2018   INR 2.2 1.3   INR Date 10/23/2018 10/30/2018   INR Goal 2.0-3.0 2.0-3.0   Trend - Up   Last Week Total 35 mg 35 mg   Next Week Total 35 mg 45 mg    Sun 5 mg 5 mg   Mon 5 mg -   Tue 5 mg 10 mg (10/30)   Wed 5 mg 5 mg   Thu 5 mg 7.5 mg   Fri 5 mg 5 mg   Sat 5 mg 7.5 mg   Visit Report - -           Plan:  1. INR is Subtherapeutic today- see above in Anticoagulation Summary.  Will instruct Scott Allen to take a booster dose today, then increase their warfarin regimen- see above in Anticoagulation Summary.  2. Follow up on Monday next week (pt is unable to come back on Friday this week)  3. Patient declines warfarin refills.  4. Verbal and written information provided. Patient expresses understanding and has no further questions at this time.    Kirsten Talbert [facility]

## 2018-10-30 NOTE — PATIENT INSTRUCTIONS
Take a booster of 10mg today.  Take 7.5mg on Thur and Sat.  Take 5mg all other days. Recheck on Monday

## 2018-10-31 ENCOUNTER — ANTICOAGULATION VISIT (OUTPATIENT)
Dept: PHARMACY | Facility: HOSPITAL | Age: 33
End: 2018-10-31

## 2018-10-31 ENCOUNTER — TELEPHONE (OUTPATIENT)
Dept: PHARMACY | Facility: HOSPITAL | Age: 33
End: 2018-10-31

## 2018-10-31 DIAGNOSIS — Z79.01 ANTICOAGULATED ON COUMADIN: ICD-10-CM

## 2018-10-31 NOTE — PROGRESS NOTES
Spoke with Marjorie Alejandro regarding low INR. Advised him we need to recheck his INR tomorrow and if still <2 we will need lovenox per Dr. Serrato. He is agreeable and will take an additional 7.5mg today and recheck his INR tomorrow.

## 2018-11-01 ENCOUNTER — ANTICOAGULATION VISIT (OUTPATIENT)
Dept: PHARMACY | Facility: HOSPITAL | Age: 33
End: 2018-11-01

## 2018-11-01 ENCOUNTER — TELEPHONE (OUTPATIENT)
Dept: PHARMACY | Facility: HOSPITAL | Age: 33
End: 2018-11-01

## 2018-11-01 DIAGNOSIS — Z79.01 ANTICOAGULATED ON COUMADIN: ICD-10-CM

## 2018-11-01 LAB
INR PPP: 1.8 (ref 0.91–1.09)
PROTHROMBIN TIME: 21.5 SECONDS (ref 10–13.8)

## 2018-11-01 PROCEDURE — 36416 COLLJ CAPILLARY BLOOD SPEC: CPT

## 2018-11-01 PROCEDURE — G0463 HOSPITAL OUTPT CLINIC VISIT: HCPCS

## 2018-11-01 PROCEDURE — 85610 PROTHROMBIN TIME: CPT

## 2018-11-01 NOTE — PROGRESS NOTES
Anticoagulation Clinic Progress Note    Anticoagulation Summary  As of 11/1/2018    INR goal:   2.0-3.0   TTR:   2.2 % (1 wk)   Today's INR:   1.8!   Warfarin maintenance plan:   7.5 mg on Thu, Sat; 5 mg all other days   Weekly warfarin total:   40 mg   Plan last modified:   Kirsten Talbert RPH (10/30/2018)   Next INR check:   11/8/2018   Priority:   Critical   Target end date:   Indefinite    Indications    Anticoagulated on Coumadin [Z51.81  Z79.01]  DVT (deep venous thrombosis) (CMS/[facility]) [I82.409] [I82.409]             Anticoagulation Episode Summary     INR check location:       Preferred lab:       Send INR reminders to:   [facility]    Comments:         Anticoagulation Care Providers     Provider Role Specialty Phone number    Lynda Serrato MD Referring Cardiology [phone]    Kirsten Talbert RPH Responsible Pharmacy           Drug interactions: has remained unchanged.  Diet: has remained unchanged.    Clinic Interview:  Patient Findings     Negatives:   Signs/symptoms of thrombosis, Signs/symptoms of bleeding,   Laboratory test error suspected, Change in health, Change in alcohol use,   Change in activity, Upcoming invasive procedure, Emergency department   visit, Upcoming dental procedure, Missed doses, Extra doses, Change in   medications, Change in diet/appetite, Hospital admission, Bruising, Other   complaints      Clinical Outcomes     Negatives:   Major bleeding event, Thromboembolic event,   Anticoagulation-related hospital admission, Anticoagulation-related ED   visit, Anticoagulation-related fatality        INR History:  Anticoagulation Monitoring 10/30/2018 10/31/2018 11/1/2018   INR 1.3 - 1.8   INR Date 10/30/2018 - 11/1/2018   INR Goal 2.0-3.0 2.0-3.0 2.0-3.0   Trend Up Same Same   Last Week Total 35 mg 40 mg 42.5 mg   Next Week Total 45 mg 42.5 mg 40 mg   Sun 5 mg - 5 mg   Mon - - 5 mg   Tue 10 mg (10/30) - 5 mg   Wed 5 mg 7.5 mg (10/31) 5 mg   Thu 7.5 mg - 7.5 mg   Fri 5 mg - 5 mg    Sat 7.5 mg - 7.5 mg   Visit Report - - -   Some recent data might be hidden     Patient was concerned about the cost of Lovenox.  It was denied by insurance last time.  Will get INR checked on Monday.  .  Plan:  1. INR is Subtherapeutic today- see above in Anticoagulation Summary.  Will instruct Scott Allen to Continue their warfarin regimen- see above in Anticoagulation Summary.  2. Follow up on Monday  3. Patient declines warfarin refills.  4. Verbal and written information provided. Patient expresses understanding and has no further questions at this time.    Kirsten Talbert [facility]

## 2018-11-05 ENCOUNTER — ANTICOAGULATION VISIT (OUTPATIENT)
Dept: PHARMACY | Facility: HOSPITAL | Age: 33
End: 2018-11-05

## 2018-11-05 DIAGNOSIS — Z79.01 ANTICOAGULATED ON COUMADIN: ICD-10-CM

## 2018-11-05 LAB
INR PPP: 3.2 (ref 0.91–1.09)
PROTHROMBIN TIME: 37.9 SECONDS (ref 10–13.8)

## 2018-11-05 PROCEDURE — 85610 PROTHROMBIN TIME: CPT

## 2018-11-05 PROCEDURE — G0463 HOSPITAL OUTPT CLINIC VISIT: HCPCS

## 2018-11-05 PROCEDURE — 36416 COLLJ CAPILLARY BLOOD SPEC: CPT

## 2018-11-05 NOTE — PROGRESS NOTES
Anticoagulation Clinic Progress Note    Anticoagulation Summary  As of 11/5/2018    INR goal:   2.0-3.0   TTR:   25.9 % (1.6 wk)   Today's INR:   3.2!   Warfarin maintenance plan:   5 mg on Mon, Wed, Fri; 7.5 mg all other days   Weekly warfarin total:   45 mg   Plan last modified:   Gracy Ansari RPH (11/5/2018)   Next INR check:   11/8/2018   Priority:   Critical   Target end date:   Indefinite    Indications    Anticoagulated on Coumadin [Z51.81  Z79.01]  DVT (deep venous thrombosis) (CMS/HCC) [I82.409] [I82.409]             Anticoagulation Episode Summary     INR check location:       Preferred lab:       [facility]    Comments:         Anticoagulation Care Providers     Provider Role Specialty Phone number    Lynda Serrato MD Referring Cardiology [phone]    Kirsten Talbert RPH Responsible Pharmacy           Drug interactions: has remained unchanged.  Diet: has remained unchanged.    Clinic Interview:      INR History:  Anticoagulation Monitoring 10/31/2018 11/1/2018 11/5/2018   INR - 1.8 3.2   INR Date - 11/1/2018 11/5/2018   INR Goal 2.0-3.0 2.0-3.0 2.0-3.0   Trend Same Same Up   Last Week Total 40 mg 42.5 mg 47.5 mg   Next Week Total 42.5 mg 40 mg 45 mg   Sun - 5 mg -   Mon - - 5 mg   Tue - - 7.5 mg   Wed 7.5 mg (10/31) - 5 mg   Thu - 7.5 mg -   Fri - 5 mg -   Sat - 7.5 mg -   Visit Report - - -   Some recent data might be hidden           Plan:  1. INR is Supratherapeutic today- see above in Anticoagulation Summary.  Will instruct Scott Allen to change their warfarin regimen- see above in Anticoagulation Summary.  2. Follow up in 3 days  3. Patient declines warfarin refills.  4. Verbal and written information provided. Patient expresses understanding and has no further questions at this time.    Kirsten Talbert RPH

## 2018-11-06 ENCOUNTER — PROCEDURE VISIT (OUTPATIENT)
Dept: NEUROLOGY | Facility: CLINIC | Age: 33
End: 2018-11-06

## 2018-11-06 DIAGNOSIS — R25.3 BENIGN FASCICULATIONS: Primary | ICD-10-CM

## 2018-11-06 PROCEDURE — 95911 NRV CNDJ TEST 9-10 STUDIES: CPT | Performed by: PSYCHIATRY & NEUROLOGY

## 2018-11-06 PROCEDURE — 95886 MUSC TEST DONE W/N TEST COMP: CPT | Performed by: PSYCHIATRY & NEUROLOGY

## 2018-11-06 NOTE — PROGRESS NOTES
EMG and Nerve Conduction Studies    Please see data sheets for details on methods, temperatures and lab standards. EMG muscles tested for upper extremity studies include the deltoid, biceps, triceps, pronator teres, extensor digitorum communis, first dorsal interosseous and abductor pollicis brevis.  EMG muscles tested for lower extremity studies include the vastus lateralis, tibialis anterior, peroneus longus, medial gastrocnemius and extensor digitorum brevis.  Additional muscles tested as needed.  Paraspinal muscles tested as needed.  The complete report includes the data sheets.    Indication: Fasciculations  History: 33-year-old white male with history of large cell lymphoma status post surgery, chemotherapy with CHOP and radiation therapy who has hypothyroidism and had open-heart surgery.  He has fasciculations which started in the left arm and had subsequently spread to all 4 extremities.  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  He currently does not have any numbness or tingling in his hands or feet but he had that previously with his chemotherapy and prior to diagnosis of hypothyroidism.  The study is specifically to look for causes of his fasciculations.        Ht: 180.3 cm  Wt: BMI 31.8  HbA1C: No results found for: HGBA1C  TSH:   Lab Results   Component Value Date    TSH 2.560 09/19/2018       Technical summary:  Nerve conduction studies were obtained in the left arm and left leg.  The hand was 32°C measured on the palm.  The foot was colder and was warmed but it was difficult to maintain above 32°C.  Temperature correction was not needed since the distal latencies were normal.  Needle examination of selected muscles of the left arm and left leg was obtained.    Results:  1.  Normal left median sensory study.  2.  Normal left ulnar sensory study.  3.  Normal left radial sensory study.  4.  Normal left median motor study.  5.   Normal left ulnar motor study.  6.  Normal left sural sensory study.  7.  Normal left superficial peroneal sensory study.  8.  Normal left peroneal motor study.  9.  Normal left tibial motor study.  10.  Normal needle examination of selected muscles in the left arm and left leg.    Impression:  Normal study.  No evidence of peripheral neuropathy, a left median or ulnar neuropathy, left cervical or lumbosacral radiculopathy, myopathy or motor neuron disease by this study.  Study results were discussed with the patient.    Garrison Poe M.D.        Addendum: Results were discussed with the patient and at this point no further investigation is needed regarding his benign fasciculations.      Dictated utilizing Dragon dictation.

## 2018-11-08 ENCOUNTER — ANTICOAGULATION VISIT (OUTPATIENT)
Dept: PHARMACY | Facility: HOSPITAL | Age: 33
End: 2018-11-08

## 2018-11-08 DIAGNOSIS — Z79.01 ANTICOAGULATED ON COUMADIN: ICD-10-CM

## 2018-11-08 LAB
INR PPP: 2.9 (ref 0.91–1.09)
PROTHROMBIN TIME: 34.7 SECONDS (ref 10–13.8)

## 2018-11-08 PROCEDURE — G0463 HOSPITAL OUTPT CLINIC VISIT: HCPCS

## 2018-11-08 PROCEDURE — 85610 PROTHROMBIN TIME: CPT

## 2018-11-08 PROCEDURE — 36416 COLLJ CAPILLARY BLOOD SPEC: CPT

## 2018-11-08 NOTE — PROGRESS NOTES
Anticoagulation Clinic Progress Note    Anticoagulation Summary  As of 11/8/2018    INR goal:   2.0-3.0   TTR:   25.9 % (1.6 wk)   Today's INR:      Warfarin maintenance plan:   5 mg on Mon, Wed, Fri; 7.5 mg all other days   Weekly warfarin total:   45 mg   No change documented:   Jeffrey Lim [facility]   Plan last modified:   Gracy Ansari [facility] (11/5/2018)   Next INR check:   11/21/2018   Priority:   Critical   Target end date:   Indefinite    Indications    Anticoagulated on Coumadin [Z51.81  Z79.01]  DVT (deep venous thrombosis) (CMS/[facility]) [I82.409] [I82.409]             Anticoagulation Episode Summary     INR check location:       Preferred lab:       Send INR reminders to:   [facility]    Comments:         Anticoagulation Care Providers     Provider Role Specialty Phone number    Lynda Serrato MD Referring Cardiology [phone]    Kirsten Talbert [facility] [phone]          Drug interactions: has remained unchanged.  Diet: has remained unchanged.    Clinic Interview:  Patient Findings     Negatives:   Signs/symptoms of thrombosis, Signs/symptoms of bleeding,   Laboratory test error suspected, Change in health, Change in alcohol use,   Change in activity, Upcoming invasive procedure, Emergency department   visit, Upcoming dental procedure, Missed doses, Extra doses, Change in   medications, Change in diet/appetite, Hospital admission, Bruising, Other   complaints      Clinical Outcomes     Negatives:   Major bleeding event, Thromboembolic event,   Anticoagulation-related hospital admission, Anticoagulation-related ED   visit, Anticoagulation-related fatality        INR History:  Anticoagulation Monitoring 11/1/2018 11/5/2018 11/8/2018   INR 1.8 3.2 -   INR Date 11/1/2018 11/5/2018 -   INR Goal 2.0-3.0 2.0-3.0 2.0-3.0   Trend Same Up Same   Last Week Total 42.5 mg 47.5 mg 42.5 mg   Next Week Total 40 mg 45 mg 45 mg   Sun 5 mg - 7.5 mg   Mon - 5 mg 5 mg   Tue - 7.5 mg 7.5 mg    Wed - 5 mg 5 mg   Thu 7.5 mg - 7.5 mg   Fri 5 mg - 5 mg   Sat 7.5 mg - 7.5 mg   Visit Report - - -   Some recent data might be hidden       Plan:  1. INR is Therapeutic today- see above in Anticoagulation Summary.  Will instruct Scott Allen to Continue their warfarin regimen- see above in Anticoagulation Summary.  2. Follow up in 1.5 weeks  3. Patient declines warfarin refills.  4. Verbal and written information provided. Patient expresses understanding and has no further questions at this time.    Jeffrey Lim, [facility]

## 2018-11-21 ENCOUNTER — ANTICOAGULATION VISIT (OUTPATIENT)
Dept: PHARMACY | Facility: HOSPITAL | Age: 33
End: 2018-11-21

## 2018-11-21 DIAGNOSIS — Z79.01 ANTICOAGULATED ON COUMADIN: ICD-10-CM

## 2018-11-21 LAB
INR PPP: 3.9 (ref 0.91–1.09)
PROTHROMBIN TIME: 46.3 SECONDS (ref 10–13.8)

## 2018-11-21 PROCEDURE — 36416 COLLJ CAPILLARY BLOOD SPEC: CPT

## 2018-11-21 PROCEDURE — 85610 PROTHROMBIN TIME: CPT

## 2018-11-21 PROCEDURE — G0463 HOSPITAL OUTPT CLINIC VISIT: HCPCS | Performed by: PHARMACIST

## 2018-11-21 NOTE — PROGRESS NOTES
Anticoagulation Clinic Progress Note    Anticoagulation Summary  As of 11/21/2018    INR goal:   2.0-3.0   TTR:   19.2 % (3.9 wk)   INR used for dosing:   3.9! (11/21/2018)   Warfarin maintenance plan:   7.5 mg on Mon, Wed, Fri; 5 mg all other days   Weekly warfarin total:   42.5 mg   Plan last modified:   Gracy Ansari [facility] (11/21/2018)   Next INR check:   12/7/2018   Priority:   Critical   Target end date:   Indefinite    Indications    Anticoagulated on Coumadin [Z51.81  Z79.01]  DVT (deep venous thrombosis) (CMS/HCC) [I82.409] [I82.409]             Anticoagulation Episode Summary     INR check location:       Preferred lab:       Send INR reminders to:    [facility]    Comments:         Anticoagulation Care Providers     Provider Role Specialty Phone number    Lynda Serrato MD Referring Cardiology [phone]    [facility]irsten Talbert [facility] [phone]          Drug interactions: has remained unchanged.  Diet: has remained unchanged.    Clinic Interview:  Patient Findings     Negatives:   Signs/symptoms of thrombosis, Signs/symptoms of bleeding,   Laboratory test error suspected, Change in health, Change in alcohol use,   Change in activity, Upcoming invasive procedure, Emergency department   visit, Upcoming dental procedure, Missed doses, Extra doses, Change in   medications, Change in diet/appetite, Hospital admission, Bruising, Other   complaints      Clinical Outcomes     Negatives:   Major bleeding event, Thromboembolic event,   Anticoagulation-related hospital admission, Anticoagulation-related ED   visit, Anticoagulation-related fatality        INR History:  Anticoagulation Monitoring 11/5/2018 11/8/2018 11/21/2018   INR 3.2 2.9 3.9   INR Date 11/5/2018 11/8/2018 11/21/2018   INR Goal 2.0-3.0 2.0-3.0 2.0-3.0   Trend Up Same Down   Last Week Total 47.5 mg 42.5 mg 45 mg   Next Week Total 45 mg 45 mg 42.5 mg   Sun - 7.5 mg 5 mg   Mon 5 mg 5 mg 7.5 mg   Tue 7.5 mg 7.5 mg 5 mg    Wed 5 mg 5 mg 7.5 mg   Thu - 7.5 mg 5 mg   Fri - 5 mg 7.5 mg   Sat - 7.5 mg 5 mg   Visit Report - - -   Some recent data might be hidden       Plan:  1. INR is Supratherapeutic today- see above in Anticoagulation Summary.  Will instruct Scott Allen to Change their warfarin regimen- see above in Anticoagulation Summary.  2. Follow up in 2 weeks  3. Patient declines warfarin refills.  4. Verbal and written information provided. Patient expresses understanding and has no further questions at this time.    Gracy Ansari, [facility]

## 2018-12-07 ENCOUNTER — ANTICOAGULATION VISIT (OUTPATIENT)
Dept: PHARMACY | Facility: HOSPITAL | Age: 33
End: 2018-12-07

## 2018-12-07 DIAGNOSIS — Z79.01 ANTICOAGULATED ON COUMADIN: ICD-10-CM

## 2018-12-07 LAB
INR PPP: 3.9 (ref 0.91–1.09)
PROTHROMBIN TIME: 46.7 SECONDS (ref 10–13.8)

## 2018-12-07 PROCEDURE — 85610 PROTHROMBIN TIME: CPT

## 2018-12-07 PROCEDURE — G0463 HOSPITAL OUTPT CLINIC VISIT: HCPCS | Performed by: PHARMACIST

## 2018-12-07 PROCEDURE — 36416 COLLJ CAPILLARY BLOOD SPEC: CPT

## 2018-12-07 NOTE — PROGRESS NOTES
Anticoagulation Clinic Progress Note    Anticoagulation Summary  As of 12/7/2018    INR goal:   2.0-3.0   TTR:   12.2 % (1.4 mo)   INR used for dosing:   3.9! (12/7/2018)   Warfarin maintenance plan:   7.5 mg on Mon, Fri; 5 mg all other days   Weekly warfarin total:   40 mg   Plan last modified:   Gracy Ansari [facility] (12/7/2018)   Next INR check:   12/21/2018   Priority:   Critical   Target end date:   Indefinite    Indications    Anticoagulated on Coumadin [Z51.81  Z79.01]  DVT (deep venous thrombosis) (CMS/[facility]) [I82.409] [I82.409]             Anticoagulation Episode Summary     INR check location:       Preferred lab:       Send INR reminders to:   [facility]    Comments:         Anticoagulation Care Providers     Provider Role Specialty Phone number    Lynda Serrato MD Referring Cardiology [phone]    [facility]irsten Talbert [facility] [phone]          Drug interactions: has remained unchanged.  Diet: has remained unchanged.    Clinic Interview:  Patient Findings     Positives:   Signs/symptoms of bleeding    Negatives:   Signs/symptoms of thrombosis, Laboratory test error   suspected, Change in health, Change in alcohol use, Change in activity,   Upcoming invasive procedure, Emergency department visit, Upcoming dental   procedure, Missed doses, Extra doses, Change in medications, Change in   diet/appetite, Hospital admission, Bruising, Other complaints    Comments:   Pt notes some bleeding when he brushes his teeth      Clinical Outcomes     Negatives:   Major bleeding event, Thromboembolic event,   Anticoagulation-related hospital admission, Anticoagulation-related ED   visit, Anticoagulation-related fatality    Comments:   Pt notes some bleeding when he brushes his teeth        INR History:  Anticoagulation Monitoring 11/8/2018 11/21/2018 12/7/2018   INR 2.9 3.9 3.9   INR Date 11/8/2018 11/21/2018 12/7/2018   INR Goal 2.0-3.0 2.0-3.0 2.0-3.0   Trend Same Down Down   Last Week  Total 42.5 mg 45 mg 42.5 mg   Next Week Total 45 mg 42.5 mg 37.5 mg   Sun 7.5 mg 5 mg 5 mg   Mon 5 mg 7.5 mg 7.5 mg   Tue 7.5 mg 5 mg 5 mg   Wed 5 mg 7.5 mg 5 mg   Thu 7.5 mg 5 mg 5 mg   Fri 5 mg 7.5 mg 5 mg (12/7); Otherwise 7.5 mg   Sat 7.5 mg 5 mg 5 mg   Visit Report - - -   Some recent data might be hidden       Plan:  1. INR is Supratherapeutic today- see above in Anticoagulation Summary.  Will instruct Scott Allen to Change their warfarin regimen- see above in Anticoagulation Summary.  2. Follow up in 2 weeks  3. Patient declines warfarin refills.  4. Verbal and written information provided. Patient expresses understanding and has no further questions at this time.    Gracy Ansari [facility]

## 2019-01-07 ENCOUNTER — ANTICOAGULATION VISIT (OUTPATIENT)
Dept: PHARMACY | Facility: HOSPITAL | Age: 34
End: 2019-01-07

## 2019-01-07 DIAGNOSIS — Z79.01 ANTICOAGULATED ON COUMADIN: ICD-10-CM

## 2019-01-07 LAB
INR PPP: 3.3 (ref 0.91–1.09)
PROTHROMBIN TIME: 39.8 SECONDS (ref 10–13.8)

## 2019-01-07 PROCEDURE — 85610 PROTHROMBIN TIME: CPT

## 2019-01-07 PROCEDURE — G0463 HOSPITAL OUTPT CLINIC VISIT: HCPCS

## 2019-01-07 PROCEDURE — 36416 COLLJ CAPILLARY BLOOD SPEC: CPT

## 2019-01-07 NOTE — PROGRESS NOTES
Anticoagulation Clinic Progress Note    Anticoagulation Summary  As of 1/7/2019    INR goal:   2.0-3.0   TTR:   7.1 % (2.5 mo)   INR used for dosing:   3.3! (1/7/2019)   Warfarin maintenance plan:   7.5 mg on Fri; 5 mg all other days   Weekly warfarin total:   37.5 mg   Plan last modified:   Rebekah Villa RPH (1/7/2019)   Next INR check:   1/21/2019   Priority:   Critical   Target end date:   Indefinite    Indications    Anticoagulated on Coumadin [Z51.81  Z79.01]  DVT (deep venous thrombosis) (CMS/HCC) [I82.409] [I82.409]             Anticoagulation Episode Summary     INR check location:       Preferred lab:       Send INR reminders to:    [facility]    Comments:         Anticoagulation Care Providers     Provider Role Specialty Phone number    Lynda Serrato MD Referring Cardiology [phone]    [facility] Respons[facility] [phone]          Clinic Interview:      INR History:  Anticoagulation Monitoring 11/21/2018 12/7/2018 1/7/2019   INR 3.9 3.9 3.3   INR Date 11/21/2018 12/7/2018 1/7/2019   INR Goal 2.0-3.0 2.0-3.0 2.0-3.0   Trend Down Down Down   Last Week Total 45 mg 42.5 mg 40 mg   Next Week Total 42.5 mg 37.5 mg 37.5 mg   Sun 5 mg 5 mg 5 mg   Mon 7.5 mg 7.5 mg 5 mg   Tue 5 mg 5 mg 5 mg   Wed 7.5 mg 5 mg 5 mg   Thu 5 mg 5 mg 5 mg   Fri 7.5 mg 5 mg (12/7); Otherwise 7.5 mg 7.5 mg   Sat 5 mg 5 mg 5 mg   Visit Report - - -   Some recent data might be hidden       Plan:  1. INR is Supratherapeutic today- see above in Anticoagulation Summary.  Will instruct Scott Allen to Change their warfarin regimen- see above in Anticoagulation Summary.  2. Follow up in 2 weeks  3. Patient declines warfarin refills.  4. Verbal and written information provided. Patient expresses understanding and has no further questions at this time.    Rebekah Villa RPH

## 2019-01-23 ENCOUNTER — TELEPHONE (OUTPATIENT)
Dept: PHARMACY | Facility: HOSPITAL | Age: 34
End: 2019-01-23

## 2019-02-08 ENCOUNTER — ANTICOAGULATION VISIT (OUTPATIENT)
Dept: PHARMACY | Facility: HOSPITAL | Age: 34
End: 2019-02-08

## 2019-02-08 DIAGNOSIS — Z79.01 ANTICOAGULATED ON COUMADIN: ICD-10-CM

## 2019-02-08 LAB
INR PPP: 2.1 (ref 0.91–1.09)
PROTHROMBIN TIME: 24.9 SECONDS (ref 10–13.8)

## 2019-02-08 PROCEDURE — 36416 COLLJ CAPILLARY BLOOD SPEC: CPT

## 2019-02-08 PROCEDURE — 85610 PROTHROMBIN TIME: CPT

## 2019-02-08 RX ORDER — WARFARIN SODIUM 5 MG/1
TABLET ORAL
Qty: 60 TABLET | Refills: 0 | Status: SHIPPED | OUTPATIENT
Start: 2019-02-08 | End: 2019-04-17 | Stop reason: SDUPTHER

## 2019-02-08 NOTE — PROGRESS NOTES
Anticoagulation Clinic Progress Note    Anticoagulation Summary  As of 2019    INR goal:   2.0-3.0   TTR:   27.5 % (3.5 mo)   INR used for dosin.1 (2019)   Warfarin maintenance plan:   7.5 mg on Fri; 5 mg all other days   Weekly warfarin total:   37.5 mg   No change documented:   Jeffrey Lim RPH   Plan last modified:   Rebekah Villa RPH (2019)   Next INR check:   3/8/2019   Priority:   Critical   Target end date:   Indefinite    Indications    Anticoagulated on Coumadin [Z51.81  Z79.01]  DVT (deep venous thrombosis) (CMS/[facility]) [I82.409] [I82.409]             Anticoagulation Episode Summary     INR check location:       Preferred lab:       Send INR reminders to:   [facility]    Comments:         Anticoagulation Care Providers     Provider Role Specialty Phone number    Lynda Serrato MD Referring Cardiology [phone]          Clinic Interview:  Patient Findings     Negatives:   Signs/symptoms of thrombosis, Signs/symptoms of bleeding,   Laboratory test error suspected, Change in health, Change in alcohol use,   Change in activity, Upcoming invasive procedure, Emergency department   visit, Upcoming dental procedure, Missed doses, Extra doses, Change in   medications, Change in diet/appetite, Hospital admission, Bruising, Other   complaints      Clinical Outcomes     Negatives:   Major bleeding event, Thromboembolic event,   Anticoagulation-related hospital admission, Anticoagulation-related ED   visit, Anticoagulation-related fatality        INR History:  Anticoagulation Monitoring 2018   INR 3.9 3.3 2.1   INR Date 2018   INR Goal 2.0-3.0 2.0-3.0 2.0-3.0   Trend Down Down Same   Last Week Total 42.5 mg 40 mg 37.5 mg   Next Week Total 37.5 mg 37.5 mg 37.5 mg   Sun 5 mg 5 mg 5 mg   Mon 7.5 mg 5 mg 5 mg   Tue 5 mg 5 mg 5 mg   Wed 5 mg 5 mg 5 mg   Thu 5 mg 5 mg 5 mg   Fri 5 mg (); Otherwise 7.5 mg 7.5 mg 7.5 mg   Sat 5 mg 5 mg 5  mg   Visit Report - - -   Some recent data might be hidden       Plan:  1. INR is therapeutic today- see above in Anticoagulation Summary.   Will instruct Scott Allen to continue their warfarin regimen- see above in Anticoagulation Summary.  2. Follow up in 4 weeks.  3. Patient desires warfarin refills.  4. Verbal and written information provided. Patient expresses understanding and has no further questions at this time.    Maritza Castelan

## 2019-03-20 ENCOUNTER — TELEPHONE (OUTPATIENT)
Dept: PHARMACY | Facility: HOSPITAL | Age: 34
End: 2019-03-20

## 2019-03-26 ENCOUNTER — TELEPHONE (OUTPATIENT)
Dept: PHARMACY | Facility: HOSPITAL | Age: 34
End: 2019-03-26

## 2019-04-02 ENCOUNTER — TELEPHONE (OUTPATIENT)
Dept: PHARMACY | Facility: HOSPITAL | Age: 34
End: 2019-04-02

## 2019-04-12 RX ORDER — WARFARIN SODIUM 5 MG/1
TABLET ORAL
Qty: 60 TABLET | Refills: 0 | Status: CANCELLED | OUTPATIENT
Start: 2019-04-12

## 2019-04-15 NOTE — TELEPHONE ENCOUNTER
I left a message for pt to call back.  He is overdue for his appt with Dr. Serrato and the INR clinic.  Leatha

## 2019-04-16 ENCOUNTER — ANTICOAGULATION VISIT (OUTPATIENT)
Dept: PHARMACY | Facility: HOSPITAL | Age: 34
End: 2019-04-16

## 2019-04-16 DIAGNOSIS — Z79.01 ANTICOAGULATED ON COUMADIN: ICD-10-CM

## 2019-04-16 LAB
INR PPP: 1.3 (ref 0.91–1.09)
PROTHROMBIN TIME: 15.2 SECONDS (ref 10–13.8)

## 2019-04-16 PROCEDURE — G0463 HOSPITAL OUTPT CLINIC VISIT: HCPCS

## 2019-04-16 PROCEDURE — 85610 PROTHROMBIN TIME: CPT

## 2019-04-16 PROCEDURE — 36416 COLLJ CAPILLARY BLOOD SPEC: CPT

## 2019-04-16 RX ORDER — CHLORAL HYDRATE 500 MG
2000 CAPSULE ORAL
COMMUNITY

## 2019-04-16 NOTE — PROGRESS NOTES
Anticoagulation Clinic Progress Note    Anticoagulation Summary  As of 2019    INR goal:   2.0-3.0   TTR:   21.7 % (5.8 mo)   INR used for dosin.3! (2019)   Warfarin maintenance plan:   7.5 mg every Fri; 5 mg all other days   Weekly warfarin total:   37.5 mg   Plan last modified:   Rebekah Villa RPH (2019)   Next INR check:   2019   Priority:   Critical   Target end date:   Indefinite    Indications    Anticoagulated on Coumadin [Z51.81  Z79.01]  DVT (deep venous thrombosis) (CMS/[facility]) [I82.409] [I82.409]             Anticoagulation Episode Summary     INR check location:       Preferred lab:       Send INR reminders to:   [facility]    Comments:         Anticoagulation Care Providers     Provider Role Specialty Phone number    Lynda Serrato MD Referring Cardiology [phone]          Clinic Interview:  Patient Findings     Positives:   Change in medications    Negatives:   Signs/symptoms of thrombosis, Signs/symptoms of bleeding,   Laboratory test error suspected, Change in health, Change in alcohol use,   Change in activity, Upcoming invasive procedure, Emergency department   visit, Upcoming dental procedure, Missed doses, Extra doses, Change in   diet/appetite, Hospital admission, Bruising, Other complaints    Comments:    started Metamucil ~2 wks ago       Clinical Outcomes     Negatives:   Major bleeding event, Thromboembolic event,   Anticoagulation-related hospital admission, Anticoagulation-related ED   visit, Anticoagulation-related fatality    Comments:    started Metamucil ~2 wks ago         INR History:  Anticoagulation Monitoring 2019   INR 3.3 2.1 1.3   INR Date 2019   INR Goal 2.0-3.0 2.0-3.0 2.0-3.0   Trend Down Same Same   Last Week Total 40 mg 37.5 mg 37.5 mg   Next Week Total 37.5 mg 37.5 mg 45 mg   Sun 5 mg 5 mg 5 mg   Mon 5 mg 5 mg 5 mg   Tue 5 mg 5 mg 10 mg ()   Wed 5 mg 5 mg 7.5 mg ()   Thu 5 mg 5  mg 5 mg   Fri 7.5 mg 7.5 mg 7.5 mg   Sat 5 mg 5 mg 5 mg   Visit Report - - -   Some recent data might be hidden       Plan:  1. INR is Subtherapeutic today- see above in Anticoagulation Summary.  Will instruct Scott Allen to Change their warfarin regimen- see above in Anticoagulation Summary.  2. Follow up in 1 week  3. Patient declines warfarin refills.  4. Verbal and written information provided. Patient expresses understanding and has no further questions at this time.    Ruben Washington [facility]

## 2019-04-16 NOTE — PATIENT INSTRUCTIONS
Extra 5 mg today (10 mg total)  Extra 2.5 mg tomorrow (7.5 mg total)  Then resume 7.5 mg F, 5 mg rest of wk.   Recheck 1 wk.

## 2019-04-17 RX ORDER — WARFARIN SODIUM 5 MG/1
TABLET ORAL
Qty: 35 TABLET | Refills: 2 | Status: SHIPPED | OUTPATIENT
Start: 2019-04-17 | End: 2019-08-05 | Stop reason: SDUPTHER

## 2019-04-23 ENCOUNTER — ANTICOAGULATION VISIT (OUTPATIENT)
Dept: PHARMACY | Facility: HOSPITAL | Age: 34
End: 2019-04-23

## 2019-04-23 DIAGNOSIS — Z79.01 ANTICOAGULATED ON COUMADIN: ICD-10-CM

## 2019-04-23 LAB
INR PPP: 2 (ref 0.91–1.09)
PROTHROMBIN TIME: 23.9 SECONDS (ref 10–13.8)

## 2019-04-23 PROCEDURE — 36416 COLLJ CAPILLARY BLOOD SPEC: CPT

## 2019-04-23 PROCEDURE — 85610 PROTHROMBIN TIME: CPT

## 2019-04-23 NOTE — PROGRESS NOTES
Anticoagulation Clinic Progress Note    Anticoagulation Summary  As of 2019    INR goal:   2.0-3.0   TTR:   20.9 % (6 mo)   INR used for dosin.0 (2019)   Warfarin maintenance plan:   7.5 mg every Fri; 5 mg all other days   Weekly warfarin total:   37.5 mg   No change documented:   Maritza Castelan   Plan last modified:   Rebekah Villa RPH (2019)   Next INR check:   2019   Priority:   Critical   Target end date:   Indefinite    Indications    Anticoagulated on Coumadin [Z51.81  Z79.01]  DVT (deep venous thrombosis) (CMS/[facility]) [I82.409] [I82.409]             Anticoagulation Episode Summary     INR check location:       Preferred lab:       Send INR reminders to:   [facility] PO[facility]    Comments:         Anticoagulation Care Providers     Provider Role Specialty Phone number    Lynda Serrato MD Referring Cardiology [phone]          Clinic Interview:  Patient Findings     Negatives:   Signs/symptoms of thrombosis, Signs/symptoms of bleeding,   Laboratory test error suspected, Change in health, Change in alcohol use,   Change in activity, Upcoming invasive procedure, Emergency department   visit, Upcoming dental procedure, Missed doses, Extra doses, Change in   medications, Change in diet/appetite, Hospital admission, Bruising, Other   complaints      Clinical Outcomes     Negatives:   Major bleeding event, Thromboembolic event,   Anticoagulation-related hospital admission, Anticoagulation-related ED   visit, Anticoagulation-related fatality        INR History:  Anticoagulation Monitoring 2019   INR 2.1 1.3 2.0   INR Date 2019   INR Goal 2.0-3.0 2.0-3.0 2.0-3.0   Trend Same Same Same   Last Week Total 37.5 mg 37.5 mg 45 mg   Next Week Total 37.5 mg 45 mg 37.5 mg   Sun 5 mg 5 mg 5 mg   Mon 5 mg 5 mg 5 mg   Tue 5 mg 10 mg () 5 mg   Wed 5 mg 7.5 mg () 5 mg   Thu 5 mg 5 mg 5 mg   Fri 7.5 mg 7.5 mg 7.5 mg   Sat 5 mg 5 mg 5 mg    Visit Report - - -   Some recent data might be hidden       Plan:  1. INR is therapeutic today- see above in Anticoagulation Summary.   Will instruct Scott Allen to continue their warfarin regimen- see above in Anticoagulation Summary.  2. Follow up in 2 weeks.  3. Patient declines warfarin refills.  4. Verbal and written information provided. Patient expresses understanding and has no further questions at this time.    Maritza Castelan

## 2019-05-06 ENCOUNTER — OFFICE VISIT (OUTPATIENT)
Dept: CARDIOLOGY | Facility: CLINIC | Age: 34
End: 2019-05-06

## 2019-05-06 ENCOUNTER — TELEPHONE (OUTPATIENT)
Dept: CARDIOLOGY | Facility: CLINIC | Age: 34
End: 2019-05-06

## 2019-05-06 ENCOUNTER — ANTICOAGULATION VISIT (OUTPATIENT)
Dept: PHARMACY | Facility: HOSPITAL | Age: 34
End: 2019-05-06

## 2019-05-06 VITALS
HEART RATE: 79 BPM | DIASTOLIC BLOOD PRESSURE: 78 MMHG | SYSTOLIC BLOOD PRESSURE: 110 MMHG | BODY MASS INDEX: 29.18 KG/M2 | WEIGHT: 208.4 LBS | HEIGHT: 71 IN

## 2019-05-06 DIAGNOSIS — I42.8 OTHER CARDIOMYOPATHY (HCC): ICD-10-CM

## 2019-05-06 DIAGNOSIS — R93.1 ABNORMAL ECHOCARDIOGRAM: ICD-10-CM

## 2019-05-06 DIAGNOSIS — I87.1 SUPERIOR VENA CAVA SYNDROME: Primary | ICD-10-CM

## 2019-05-06 DIAGNOSIS — Z86.79 HISTORY OF PERICARDITIS: ICD-10-CM

## 2019-05-06 DIAGNOSIS — Z79.01 ANTICOAGULATED ON COUMADIN: ICD-10-CM

## 2019-05-06 DIAGNOSIS — Z79.01 CURRENT USE OF LONG TERM ANTICOAGULATION: ICD-10-CM

## 2019-05-06 LAB
INR PPP: 2.3 (ref 0.91–1.09)
PROTHROMBIN TIME: 27.6 SECONDS (ref 10–13.8)

## 2019-05-06 PROCEDURE — 93000 ELECTROCARDIOGRAM COMPLETE: CPT | Performed by: NURSE PRACTITIONER

## 2019-05-06 PROCEDURE — 99214 OFFICE O/P EST MOD 30 MIN: CPT | Performed by: NURSE PRACTITIONER

## 2019-05-06 PROCEDURE — 85610 PROTHROMBIN TIME: CPT

## 2019-05-06 PROCEDURE — 36416 COLLJ CAPILLARY BLOOD SPEC: CPT

## 2019-05-06 NOTE — PROGRESS NOTES
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      History of Present Illness:  Scott Allen is a 34 y.o. male patient of Dr. Serrato.  This is my first time seeing this patient in the office today, and I reviewed his records.    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    In  patient was treated for non-Hodgkin's lymphoma with chemotherapy and radiation therapy.  He has been followed by Dr. Mittal locally and is felt to be cured.  He developed bilateral upper extremity DVT and subclavian vein stenosis and percutaneous revascularization was attempted several times.  However this was unsuccessful and he developed subclavian vein syndrome.  [facility] and had a surgical vascular shunt placed from the left internal jugular vein to the right atrium.  He was placed on Lovenox and aspirin with plans to transition to Coumadin.  However due to patient's work schedule he had not been able to return for this.  He developed pleural effusions and pericardial effusion postoperatively which improved with nonsteroidals.  Follow-up echo 2018 showed low normal systolic function with EF of 45 to 50%, though by Definity ejection fraction was normal with small pericardial effusion and no evidence of pericardial constraint.  Trivial mitral regurgitation was seen.  Follow-up CT scan 2018 showed graft with a mass in the superior mediastinum that was unchanged.  Right upper lobe nodule was felt to be stable.      Patient presents to office today for follow-up  appointment.  Patient reports he has been feeling better since last visit.  He feels like his energy levels and fatigue have improved and he has gotten back to his normal working out schedule and is feeling well with that and has lost weight on purpose.  He continues to have some rare palpitations, however these are chronic intermittent and actually improved.  He has some chronic lightheadedness that he attributes to his history of pulmonary fibrosis, not new or worsening.  He is followed by pulmonology.  Also has a history of some mild chronic shortness of breath, not new or worsening, which again he is followed by pulmonology.  Denies any chest pain, lower extremity edema, syncope, near syncope, falls, or abnormal bleeding.  He did not get the home sleep study done since last visit due to having some insurance issues that have now been sorted out.  He is still having some morning fatigue though it has improved somewhat.  I recommended that he reschedule this today.        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   • TIA (transient ischemic attack)      Past Surgical History:   Procedure Laterality Date   • BRACHIOCEPHALIC VEIN ANGIOPLASTY / STENTING Bilateral     no stents were able to be placed   • BYPASS GRAFT  05/17/2018    Heart surgery bypass; Left IJ to right atrium   • CHEST TUBE INSERTION Right     after thoracentesis   • EXCISION MASS  HEAD/NECK Left 2/27/2017    Procedure: Excisional biopsy of left occipital lymph node;  Surgeon: Catalino Damon MD;  Location: [facility];  Service:    • OTHER SURGICAL HISTORY      SVC reconstruction/graft   • RIB BIOPSY Right 2011    MEDIASTINUM   • SKIN BIOPSY      cyst on left shoulder, benign   • THORACENTESIS Right     thora drain left in for 6 months     Outpatient Medications Prior to Visit   Medication Sig Dispense Refill   • aspirin 81 MG tablet Take 1 tablet by mouth Daily. 90 tablet 3   • levothyroxine (SYNTHROID) 88 MCG tablet Take 1 tablet by mouth Daily. 90 tablet 1   • Omega-3 Fatty Acids (FISH OIL) 1000 MG capsule capsule Take 2,000 mg by mouth Daily With Breakfast.     • Psyllium (METAMUCIL FIBER PO) Take  by mouth 3 (Three) Times a Day.     • warfarin (COUMADIN) 5 MG tablet Take 1.5 tablets (7.5 mg) by mouth on Fri and 1 tablet (5 mg) all other days, or as directed by the [facility]. 35 tablet 2     No facility-administered medications prior to visit.        Allergies as of 05/06/2019 - Reviewed 05/06/2019   Allergen Reaction Noted   • Contrast dye Other (See Comments) 05/31/2018   • Zoloft [sertraline hcl]  09/29/2017     Social History     Socioeconomic History   • Marital status: Single     Spouse name: Not on file   • Number of children: Not on file   • Years of education: College   • Highest education level: Not on file   Occupational History   • Occupation: Physical therapy tech     Employer: [employer]   Tobacco Use   • Smoking status: Never Smoker   • Smokeless tobacco: Never Used   • Tobacco comment: Daily caffeine use   Substance and Sexual Activity   • Alcohol use: Yes     Comment: occasional, once a month   • Drug use: No   • Sexual activity: Yes     Partners: Male   Social History Narrative    Lives at home with his dog.  Works as a Physical Therapist.       Family History   Problem Relation Age of Onset   • Cancer Maternal Grandfather    •  "Cancer Paternal Grandmother    • Diabetes Paternal Grandmother    • Asthma Paternal Grandmother    • Stroke Paternal Grandmother    • Hypertension Paternal Grandmother    • Aneurysm Paternal Grandmother    • Cancer Paternal Grandfather    • Diabetes Father    • Sudden death Maternal Grandmother    • Cancer Maternal Grandmother        Review of Systems   Constitution: Positive for weight loss (25 lb, on purpose, working out ). Negative for chills, fever, malaise/fatigue and weight gain.   HENT: Positive for hearing loss. Negative for ear pain, nosebleeds and sore throat.    Eyes: Negative for blurred vision, double vision, redness, vision loss in left eye and vision loss in right eye.   Respiratory: Negative for cough, shortness of breath, snoring and wheezing.    Endocrine: Negative for cold intolerance and heat intolerance.   Skin: Negative for itching, rash and suspicious lesions.   Musculoskeletal: Negative for joint pain, joint swelling and myalgias.   Gastrointestinal: Negative for abdominal pain, diarrhea, hematemesis, melena, nausea and vomiting.   Genitourinary: Negative for dysuria, frequency and hematuria.   Neurological: Negative for dizziness, headaches, numbness, paresthesias and seizures.   Psychiatric/Behavioral: Negative for altered mental status and depression. The patient is nervous/anxious.           Objective:     Vitals:    05/06/19 1002   BP: 110/78   BP Location: Left arm   Pulse: 79   Weight: 94.5 kg (208 lb 6.4 oz)   Height: 180.3 cm (71\")     Body mass index is 29.07 kg/m².      PHYSICAL EXAM:  Physical Exam   Constitutional: He is oriented to person, place, and time. He appears well-developed and well-nourished. No distress.   HENT:   Head: Normocephalic and atraumatic.   Eyes: Pupils are equal, round, and reactive to light.   Neck: Neck supple. No JVD present. Carotid bruit is not present. No tracheal deviation present.   Cardiovascular: Normal rate, regular rhythm, normal heart sounds " and intact distal pulses. Exam reveals no gallop and no friction rub.   No murmur heard.  Pulses:       Radial pulses are 2+ on the right side, and 2+ on the left side.        Posterior tibial pulses are 2+ on the right side, and 2+ on the left side.   Pulmonary/Chest: Effort normal and breath sounds normal. No respiratory distress. He has no wheezes. He has no rales.   Abdominal: Soft. Bowel sounds are normal. He exhibits no distension. There is no tenderness.   Musculoskeletal: He exhibits no edema, tenderness or deformity.   Neurological: He is alert and oriented to person, place, and time.   Skin: Skin is warm and dry. No rash noted. He is not diaphoretic. No erythema.   Psychiatric: He has a normal mood and affect. His behavior is normal. Judgment normal.           ECG 12 Lead  Date/Time: 5/6/2019 10:32 AM  Performed by: Jacqui Hagan DNP, DAVE  Authorized by: Jacqui Hagan DNP, DAVE   Comparison: compared with previous ECG from 9/17/2018  Similar to previous ECG  Rhythm: sinus rhythm  Rate: normal  BPM: 79  QRS axis: left (borderline)  Comments: No significant changes from previous ECG.              Assessment:       Diagnosis Plan   1. Superior vena cava syndrome     2. Abnormal echocardiogram  Adult Transthoracic Echo Complete W/ Cont if Necessary Per Protocol   3. Other cardiomyopathy (CMS/HCC)     4. History of pericarditis     5. Current use of long term anticoagulation           Plan:     1. Subclavian vein obstruction/syndrome: Status post left internal jugular to right atrial revascularization.  Remains on Coumadin anticoagulation therapy.  Clinically stable.  2. Pericardial effusion: Resolved with nonsteroidals.  No evidence of pericardial constraint.  3. Questionable cardiomyopathy: With Definity ejection fraction appeared to be near normal.  Plan to order repeat echocardiogram this visit, per previous office note.  4. Suspected sleep apnea: Home sleep study was ordered however it looks  like this was never done.  Patient to reschedule home sleep study.  5. History of non-Hodgkin's lymphoma with chest wall radiation  6. History of bilateral upper extremity DVT  7. Hypothyroidism: Managed by outside provider.  On Synthroid.    Patient to follow-up with Dr. Serrato in 6 months or sooner if needed for any new, recurrent, or worsening symptoms or other problems/concerns.           Your medication list           Accurate as of 5/6/19 10:34 AM. If you have any questions, ask your nurse or doctor.               CONTINUE taking these medications      Instructions Last Dose Given Next Dose Due   aspirin 81 MG tablet      Take 1 tablet by mouth Daily.       fish oil 1000 MG capsule capsule      Take 2,000 mg by mouth Daily With Breakfast.       levothyroxine 88 MCG tablet  Commonly known as:  SYNTHROID      Take 1 tablet by mouth Daily.       METAMUCIL FIBER PO      Take  by mouth 3 (Three) Times a Day.       warfarin 5 MG tablet  Commonly known as:  COUMADIN      Take 1.5 tablets (7.5 mg) by mouth on Fri and 1 tablet (5 mg) all other days, or as directed by the [facility].                  Thanks,    Jacqui Hagan, DNP, APRN  05/06/2019             Dictated utilizing Dragon dictation

## 2019-05-06 NOTE — TELEPHONE ENCOUNTER
Patient is requesting a copy of his office note be faxed to Dr. Arellano with the [facility].  Psychiatric will not allow me to send this electronically.  Please find out if the appropriate releases on file and fax a copy of today's note if so.

## 2019-05-06 NOTE — TELEPHONE ENCOUNTER
5/6/19  [facility]Dr. Librado Arellano is [phone] and spoke with Elo who gave me fax #[phone].  Faxed today's ofc., note and EKG/jj

## 2019-05-08 ENCOUNTER — APPOINTMENT (OUTPATIENT)
Dept: CARDIOLOGY | Facility: HOSPITAL | Age: 34
End: 2019-05-08

## 2019-05-26 ENCOUNTER — HOSPITAL ENCOUNTER (EMERGENCY)
Facility: HOSPITAL | Age: 34
Discharge: HOME | End: 2019-05-26
Attending: EMERGENCY MEDICINE | Admitting: EMERGENCY MEDICINE

## 2019-05-26 ENCOUNTER — APPOINTMENT (OUTPATIENT)
Dept: GENERAL RADIOLOGY | Facility: HOSPITAL | Age: 34
End: 2019-05-26

## 2019-05-26 ENCOUNTER — APPOINTMENT (OUTPATIENT)
Dept: CT IMAGING | Facility: HOSPITAL | Age: 34
End: 2019-05-26

## 2019-05-26 VITALS
OXYGEN SATURATION: 96 % | HEIGHT: 71 IN | RESPIRATION RATE: 18 BRPM | TEMPERATURE: 98.7 F | HEART RATE: 95 BPM | SYSTOLIC BLOOD PRESSURE: 122 MMHG | DIASTOLIC BLOOD PRESSURE: 79 MMHG | BODY MASS INDEX: 28.28 KG/M2 | WEIGHT: 202 LBS

## 2019-05-26 DIAGNOSIS — R07.9 CHEST PAIN, UNSPECIFIED TYPE: Primary | ICD-10-CM

## 2019-05-26 LAB
ALBUMIN SERPL-MCNC: 4.5 G/DL (ref 3.5–5.2)
ALBUMIN/GLOB SERPL: 1.6 G/DL
ALP SERPL-CCNC: 53 U/L (ref 39–117)
ALT SERPL W P-5'-P-CCNC: 22 U/L (ref 1–41)
ANION GAP SERPL CALCULATED.3IONS-SCNC: 11.2 MMOL/L
AST SERPL-CCNC: 27 U/L (ref 1–40)
BASOPHILS # BLD AUTO: 0.04 10*3/MM3 (ref 0–0.2)
BASOPHILS NFR BLD AUTO: 0.7 % (ref 0–1.5)
BILIRUB SERPL-MCNC: 0.5 MG/DL (ref 0.2–1.2)
BUN BLD-MCNC: 15 MG/DL (ref 6–20)
BUN/CREAT SERPL: 17 (ref 7–25)
CALCIUM SPEC-SCNC: 9.2 MG/DL (ref 8.6–10.5)
CHLORIDE SERPL-SCNC: 105 MMOL/L (ref 98–107)
CO2 SERPL-SCNC: 22.8 MMOL/L (ref 22–29)
CREAT BLD-MCNC: 0.88 MG/DL (ref 0.76–1.27)
DEPRECATED RDW RBC AUTO: 43.4 FL (ref 37–54)
EOSINOPHIL # BLD AUTO: 0.12 10*3/MM3 (ref 0–0.4)
EOSINOPHIL NFR BLD AUTO: 2.1 % (ref 0.3–6.2)
ERYTHROCYTE [DISTWIDTH] IN BLOOD BY AUTOMATED COUNT: 13.9 % (ref 12.3–15.4)
GFR SERPL CREATININE-BSD FRML MDRD: 99 ML/MIN/1.73
GLOBULIN UR ELPH-MCNC: 2.9 GM/DL
GLUCOSE BLD-MCNC: 98 MG/DL (ref 65–99)
HCT VFR BLD AUTO: 46.8 % (ref 37.5–51)
HGB BLD-MCNC: 15.7 G/DL (ref 13–17.7)
HOLD SPECIMEN: NORMAL
HOLD SPECIMEN: NORMAL
IMM GRANULOCYTES # BLD AUTO: 0.02 10*3/MM3 (ref 0–0.05)
IMM GRANULOCYTES NFR BLD AUTO: 0.4 % (ref 0–0.5)
INR PPP: 1.46 (ref 0.9–1.1)
LYMPHOCYTES # BLD AUTO: 1.21 10*3/MM3 (ref 0.7–3.1)
LYMPHOCYTES NFR BLD AUTO: 21.2 % (ref 19.6–45.3)
MCH RBC QN AUTO: 28.8 PG (ref 26.6–33)
MCHC RBC AUTO-ENTMCNC: 33.5 G/DL (ref 31.5–35.7)
MCV RBC AUTO: 85.7 FL (ref 79–97)
MONOCYTES # BLD AUTO: 0.45 10*3/MM3 (ref 0.1–0.9)
MONOCYTES NFR BLD AUTO: 7.9 % (ref 5–12)
NEUTROPHILS # BLD AUTO: 3.87 10*3/MM3 (ref 1.7–7)
NEUTROPHILS NFR BLD AUTO: 67.7 % (ref 42.7–76)
NRBC BLD AUTO-RTO: 0 /100 WBC (ref 0–0.2)
PLATELET # BLD AUTO: 144 10*3/MM3 (ref 140–450)
PMV BLD AUTO: 10 FL (ref 6–12)
POTASSIUM BLD-SCNC: 4.1 MMOL/L (ref 3.5–5.2)
PROT SERPL-MCNC: 7.4 G/DL (ref 6–8.5)
PROTHROMBIN TIME: 17.5 SECONDS (ref 11.7–14.2)
RBC # BLD AUTO: 5.46 10*6/MM3 (ref 4.14–5.8)
SODIUM BLD-SCNC: 139 MMOL/L (ref 136–145)
TROPONIN T SERPL-MCNC: <0.01 NG/ML (ref 0–0.03)
TROPONIN T SERPL-MCNC: <0.01 NG/ML (ref 0–0.03)
WBC NRBC COR # BLD: 5.71 10*3/MM3 (ref 3.4–10.8)
WHOLE BLOOD HOLD SPECIMEN: NORMAL
WHOLE BLOOD HOLD SPECIMEN: NORMAL

## 2019-05-26 PROCEDURE — 80053 COMPREHEN METABOLIC PANEL: CPT

## 2019-05-26 PROCEDURE — 93005 ELECTROCARDIOGRAM TRACING: CPT | Performed by: EMERGENCY MEDICINE

## 2019-05-26 PROCEDURE — 93005 ELECTROCARDIOGRAM TRACING: CPT

## 2019-05-26 PROCEDURE — 96375 TX/PRO/DX INJ NEW DRUG ADDON: CPT

## 2019-05-26 PROCEDURE — 85610 PROTHROMBIN TIME: CPT

## 2019-05-26 PROCEDURE — 25010000002 METHYLPREDNISOLONE PER 125 MG: Performed by: PHYSICIAN ASSISTANT

## 2019-05-26 PROCEDURE — 71275 CT ANGIOGRAPHY CHEST: CPT

## 2019-05-26 PROCEDURE — 25010000002 DIPHENHYDRAMINE PER 50 MG: Performed by: PHYSICIAN ASSISTANT

## 2019-05-26 PROCEDURE — 84484 ASSAY OF TROPONIN QUANT: CPT

## 2019-05-26 PROCEDURE — 85025 COMPLETE CBC W/AUTO DIFF WBC: CPT

## 2019-05-26 PROCEDURE — 99284 EMERGENCY DEPT VISIT MOD MDM: CPT

## 2019-05-26 PROCEDURE — 93010 ELECTROCARDIOGRAM REPORT: CPT | Performed by: INTERNAL MEDICINE

## 2019-05-26 PROCEDURE — 84484 ASSAY OF TROPONIN QUANT: CPT | Performed by: EMERGENCY MEDICINE

## 2019-05-26 PROCEDURE — 96374 THER/PROPH/DIAG INJ IV PUSH: CPT

## 2019-05-26 PROCEDURE — 71046 X-RAY EXAM CHEST 2 VIEWS: CPT

## 2019-05-26 PROCEDURE — 0 IOPAMIDOL PER 1 ML: Performed by: EMERGENCY MEDICINE

## 2019-05-26 RX ORDER — ACETAMINOPHEN 500 MG
1000 TABLET ORAL ONCE
Status: COMPLETED | OUTPATIENT
Start: 2019-05-26 | End: 2019-05-26

## 2019-05-26 RX ORDER — METHYLPREDNISOLONE SODIUM SUCCINATE 125 MG/2ML
125 INJECTION, POWDER, LYOPHILIZED, FOR SOLUTION INTRAMUSCULAR; INTRAVENOUS ONCE
Status: COMPLETED | OUTPATIENT
Start: 2019-05-26 | End: 2019-05-26

## 2019-05-26 RX ORDER — SODIUM CHLORIDE 0.9 % (FLUSH) 0.9 %
10 SYRINGE (ML) INJECTION AS NEEDED
Status: DISCONTINUED | OUTPATIENT
Start: 2019-05-26 | End: 2019-05-26 | Stop reason: HOSPADM

## 2019-05-26 RX ORDER — DIPHENHYDRAMINE HYDROCHLORIDE 50 MG/ML
25 INJECTION INTRAMUSCULAR; INTRAVENOUS ONCE
Status: COMPLETED | OUTPATIENT
Start: 2019-05-26 | End: 2019-05-26

## 2019-05-26 RX ADMIN — DIPHENHYDRAMINE HYDROCHLORIDE 25 MG: 50 INJECTION, SOLUTION INTRAMUSCULAR; INTRAVENOUS at 10:26

## 2019-05-26 RX ADMIN — ACETAMINOPHEN 1000 MG: 500 TABLET, FILM COATED ORAL at 11:57

## 2019-05-26 RX ADMIN — IOPAMIDOL 90 ML: 755 INJECTION, SOLUTION INTRAVENOUS at 11:38

## 2019-05-26 RX ADMIN — METHYLPREDNISOLONE SODIUM SUCCINATE 125 MG: 125 INJECTION, POWDER, FOR SOLUTION INTRAMUSCULAR; INTRAVENOUS at 10:05

## 2019-06-10 ENCOUNTER — ANTICOAGULATION VISIT (OUTPATIENT)
Dept: PHARMACY | Facility: HOSPITAL | Age: 34
End: 2019-06-10

## 2019-06-10 DIAGNOSIS — Z79.01 ANTICOAGULATED ON COUMADIN: ICD-10-CM

## 2019-06-10 LAB
INR PPP: 2.5 (ref 0.91–1.09)
PROTHROMBIN TIME: 29.8 SECONDS (ref 10–13.8)

## 2019-06-10 PROCEDURE — 85610 PROTHROMBIN TIME: CPT

## 2019-06-10 PROCEDURE — 36416 COLLJ CAPILLARY BLOOD SPEC: CPT

## 2019-06-10 NOTE — PROGRESS NOTES
Anticoagulation Clinic Progress Note    Anticoagulation Summary  As of 6/10/2019    INR goal:   2.0-3.0   TTR:   28.4 % (7.6 mo)   INR used for dosin.5 (6/10/2019)   Warfarin maintenance plan:   7.5 mg every Fri; 5 mg all other days   Weekly warfarin total:   37.5 mg   No change documented:   Ruben Washington RPH   Plan last modified:   Rebekah Villa RPH (2019)   Next INR check:   2019   Priority:   Maintenance   Target end date:   Indefinite    Indications    Anticoagulated on Coumadin [Z51.81  Z79.01]  DVT (deep venous thrombosis) (CMS/[facility]) [I82.409] [I82.409]             Anticoagulation Episode Summary     INR check location:       Preferred lab:       Send INR reminders to:   [facility] [place]    Comments:         Anticoagulation Care Providers     Provider Role Specialty Phone number    Lynda Serrato MD Referring Cardiology [phone]          Clinic Interview:  Patient Findings     Positives:   Extra doses    Negatives:   Signs/symptoms of thrombosis, Signs/symptoms of bleeding,   Laboratory test error suspected, Change in health, Change in alcohol use,   Change in activity, Upcoming invasive procedure, Emergency department   visit, Upcoming dental procedure, Missed doses, Change in medications,   Change in diet/appetite, Hospital admission, Bruising, Other complaints    Comments:   Unclear reason for low INR in ED  (had just returned from   [place]); took two boosts then resumed.       Clinical Outcomes     Negatives:   Major bleeding event, Thromboembolic event,   Anticoagulation-related hospital admission, Anticoagulation-related ED   visit, Anticoagulation-related fatality    Comments:   Unclear reason for low INR in ED  (had just returned from   [place]); took two boosts then resumed.         INR History:  Anticoagulation Monitoring 2019 2019 6/10/2019   INR 2.0 2.3 2.5   INR Date 2019 2019 6/10/2019   INR Goal 2.0-3.0 2.0-3.0 2.0-3.0   Trend Same  Same Same   Last Week Total 45 mg 37.5 mg 37.5 mg   Next Week Total 37.5 mg 37.5 mg 37.5 mg   Sun 5 mg 5 mg 5 mg   Mon 5 mg 5 mg 5 mg   Tue 5 mg 5 mg 5 mg   Wed 5 mg 5 mg 5 mg   Thu 5 mg 5 mg 5 mg   Fri 7.5 mg 7.5 mg 7.5 mg   Sat 5 mg 5 mg 5 mg   Visit Report - - -   Some recent data might be hidden       Plan:  1. INR is Therapeutic today- see above in Anticoagulation Summary.  Will instruct Scott Allen to Continue their warfarin regimen- see above in Anticoagulation Summary.  2. Follow up in 2 weeks  3. Patient declines warfarin refills.  4. Verbal and written information provided. Patient expresses understanding and has no further questions at this time.    Ruben Washington [facility]

## 2019-06-17 ENCOUNTER — HOSPITAL ENCOUNTER (OUTPATIENT)
Dept: CARDIOLOGY | Facility: HOSPITAL | Age: 34
Discharge: HOME | End: 2019-06-17
Admitting: NURSE PRACTITIONER

## 2019-06-17 VITALS
HEIGHT: 70 IN | HEART RATE: 75 BPM | SYSTOLIC BLOOD PRESSURE: 108 MMHG | WEIGHT: 203 LBS | DIASTOLIC BLOOD PRESSURE: 69 MMHG | BODY MASS INDEX: 29.06 KG/M2

## 2019-06-17 DIAGNOSIS — R93.1 ABNORMAL ECHOCARDIOGRAM: ICD-10-CM

## 2019-06-17 PROCEDURE — 25010000002 PERFLUTREN (DEFINITY) 8.476 MG IN SODIUM CHLORIDE 0.9 % 10 ML INJECTION: Performed by: NURSE PRACTITIONER

## 2019-06-17 PROCEDURE — 93306 TTE W/DOPPLER COMPLETE: CPT

## 2019-06-17 PROCEDURE — 93306 TTE W/DOPPLER COMPLETE: CPT | Performed by: INTERNAL MEDICINE

## 2019-06-17 RX ADMIN — PERFLUTREN 1 ML: 6.52 INJECTION, SUSPENSION INTRAVENOUS at 10:33

## 2019-06-18 ENCOUNTER — TELEPHONE (OUTPATIENT)
Dept: CARDIOLOGY | Facility: CLINIC | Age: 34
End: 2019-06-18

## 2019-06-18 DIAGNOSIS — R93.1 DECREASED CARDIAC EJECTION FRACTION: Primary | ICD-10-CM

## 2019-06-18 LAB
AORTIC ROOT ANNULUS: 2.7 CM
ASCENDING AORTA: 2.7 CM
BH CV ECHO MEAS - ACS: 2.5 CM
BH CV ECHO MEAS - AO MAX PG (FULL): 2.7 MMHG
BH CV ECHO MEAS - AO MAX PG: 4.3 MMHG
BH CV ECHO MEAS - AO MEAN PG (FULL): 2.1 MMHG
BH CV ECHO MEAS - AO MEAN PG: 3 MMHG
BH CV ECHO MEAS - AO V2 MAX: 103.8 CM/SEC
BH CV ECHO MEAS - AO V2 MEAN: 84.5 CM/SEC
BH CV ECHO MEAS - AO V2 VTI: 18.1 CM
BH CV ECHO MEAS - AVA(I,A): 2.3 CM^2
BH CV ECHO MEAS - AVA(I,D): 2.3 CM^2
BH CV ECHO MEAS - AVA(V,A): 2.2 CM^2
BH CV ECHO MEAS - AVA(V,D): 2.2 CM^2
BH CV ECHO MEAS - BSA(HAYCOCK): 2.2 M^2
BH CV ECHO MEAS - BSA: 2.1 M^2
BH CV ECHO MEAS - BZI_BMI: 28.3 KILOGRAMS/M^2
BH CV ECHO MEAS - BZI_METRIC_HEIGHT: 180.3 CM
BH CV ECHO MEAS - BZI_METRIC_WEIGHT: 92.1 KG
BH CV ECHO MEAS - EDV(MOD-SP2): 104 ML
BH CV ECHO MEAS - EDV(MOD-SP4): 122 ML
BH CV ECHO MEAS - EDV(TEICH): 130.2 ML
BH CV ECHO MEAS - EF(CUBED): 61.9 %
BH CV ECHO MEAS - EF(MOD-BP): 49 %
BH CV ECHO MEAS - EF(MOD-SP2): 51.9 %
BH CV ECHO MEAS - EF(MOD-SP4): 51.6 %
BH CV ECHO MEAS - EF(TEICH): 53.1 %
BH CV ECHO MEAS - ESV(MOD-SP2): 50 ML
BH CV ECHO MEAS - ESV(MOD-SP4): 59 ML
BH CV ECHO MEAS - ESV(TEICH): 61.1 ML
BH CV ECHO MEAS - FS: 27.5 %
BH CV ECHO MEAS - IVS/LVPW: 1.2
BH CV ECHO MEAS - IVSD: 1 CM
BH CV ECHO MEAS - LAT PEAK E' VEL: 16 CM/SEC
BH CV ECHO MEAS - LV DIASTOLIC VOL/BSA (35-75): 57.5 ML/M^2
BH CV ECHO MEAS - LV MASS(C)D: 183.5 GRAMS
BH CV ECHO MEAS - LV MASS(C)DI: 86.5 GRAMS/M^2
BH CV ECHO MEAS - LV MAX PG: 1.6 MMHG
BH CV ECHO MEAS - LV MEAN PG: 0.91 MMHG
BH CV ECHO MEAS - LV SYSTOLIC VOL/BSA (12-30): 27.8 ML/M^2
BH CV ECHO MEAS - LV V1 MAX: 63.1 CM/SEC
BH CV ECHO MEAS - LV V1 MEAN: 46 CM/SEC
BH CV ECHO MEAS - LV V1 VTI: 11.5 CM
BH CV ECHO MEAS - LVIDD: 5.2 CM
BH CV ECHO MEAS - LVIDS: 3.8 CM
BH CV ECHO MEAS - LVLD AP2: 7.5 CM
BH CV ECHO MEAS - LVLD AP4: 7.4 CM
BH CV ECHO MEAS - LVLS AP2: 6.8 CM
BH CV ECHO MEAS - LVLS AP4: 6.8 CM
BH CV ECHO MEAS - LVOT AREA (M): 3.5 CM^2
BH CV ECHO MEAS - LVOT AREA: 3.6 CM^2
BH CV ECHO MEAS - LVOT DIAM: 2.1 CM
BH CV ECHO MEAS - LVPWD: 0.87 CM
BH CV ECHO MEAS - MED PEAK E' VEL: 11 CM/SEC
BH CV ECHO MEAS - MV A DUR: 0.11 SEC
BH CV ECHO MEAS - MV A MAX VEL: 35.9 CM/SEC
BH CV ECHO MEAS - MV DEC SLOPE: 334.6 CM/SEC^2
BH CV ECHO MEAS - MV DEC TIME: 0.17 SEC
BH CV ECHO MEAS - MV E MAX VEL: 56.8 CM/SEC
BH CV ECHO MEAS - MV E/A: 1.6
BH CV ECHO MEAS - MV MAX PG: 1.8 MMHG
BH CV ECHO MEAS - MV MEAN PG: 0.79 MMHG
BH CV ECHO MEAS - MV P1/2T MAX VEL: 57.2 CM/SEC
BH CV ECHO MEAS - MV P1/2T: 50.1 MSEC
BH CV ECHO MEAS - MV V2 MAX: 66.4 CM/SEC
BH CV ECHO MEAS - MV V2 MEAN: 42.1 CM/SEC
BH CV ECHO MEAS - MV V2 VTI: 20 CM
BH CV ECHO MEAS - MVA P1/2T LCG: 3.8 CM^2
BH CV ECHO MEAS - MVA(P1/2T): 4.4 CM^2
BH CV ECHO MEAS - MVA(VTI): 2.1 CM^2
BH CV ECHO MEAS - PA ACC TIME: 0.11 SEC
BH CV ECHO MEAS - PA MAX PG (FULL): 2.9 MMHG
BH CV ECHO MEAS - PA MAX PG: 3.7 MMHG
BH CV ECHO MEAS - PA PR(ACCEL): 28.3 MMHG
BH CV ECHO MEAS - PA V2 MAX: 96.4 CM/SEC
BH CV ECHO MEAS - PI END-D VEL: 99.1 CM/SEC
BH CV ECHO MEAS - PULM A REVS DUR: 0.1 SEC
BH CV ECHO MEAS - PULM A REVS VEL: 21.7 CM/SEC
BH CV ECHO MEAS - PULM DIAS VEL: 44.3 CM/SEC
BH CV ECHO MEAS - PULM S/D: 0.83
BH CV ECHO MEAS - PULM SYS VEL: 36.8 CM/SEC
BH CV ECHO MEAS - PVA(V,A): 1.5 CM^2
BH CV ECHO MEAS - PVA(V,D): 1.5 CM^2
BH CV ECHO MEAS - QP/QS: 0.7
BH CV ECHO MEAS - RAP SYSTOLE: 3 MMHG
BH CV ECHO MEAS - RV MAX PG: 0.8 MMHG
BH CV ECHO MEAS - RV MEAN PG: 0.51 MMHG
BH CV ECHO MEAS - RV V1 MAX: 44.6 CM/SEC
BH CV ECHO MEAS - RV V1 MEAN: 34 CM/SEC
BH CV ECHO MEAS - RV V1 VTI: 8.9 CM
BH CV ECHO MEAS - RVOT AREA: 3.2 CM^2
BH CV ECHO MEAS - RVOT DIAM: 2 CM
BH CV ECHO MEAS - RVSP: 10.1 MMHG
BH CV ECHO MEAS - SI(CUBED): 41.3 ML/M^2
BH CV ECHO MEAS - SI(LVOT): 19.5 ML/M^2
BH CV ECHO MEAS - SI(MOD-SP2): 25.4 ML/M^2
BH CV ECHO MEAS - SI(MOD-SP4): 29.7 ML/M^2
BH CV ECHO MEAS - SI(TEICH): 32.5 ML/M^2
BH CV ECHO MEAS - SUP REN AO DIAM: 1.9 CM
BH CV ECHO MEAS - SV(CUBED): 87.6 ML
BH CV ECHO MEAS - SV(LVOT): 41.4 ML
BH CV ECHO MEAS - SV(MOD-SP2): 54 ML
BH CV ECHO MEAS - SV(MOD-SP4): 63 ML
BH CV ECHO MEAS - SV(RVOT): 28.8 ML
BH CV ECHO MEAS - SV(TEICH): 69.1 ML
BH CV ECHO MEAS - TAPSE (>1.6): 1.7 CM2
BH CV ECHO MEAS - TR MAX VEL: 133 CM/SEC
BH CV ECHO MEASUREMENTS AVERAGE E/E' RATIO: 4.21
BH CV XLRA - RV BASE: 2.4 CM
BH CV XLRA - TDI S': 10 CM/SEC
LEFT ATRIUM VOLUME INDEX: 22 ML/M2
LV EF 2D ECHO EST: 49 %
MAXIMAL PREDICTED HEART RATE: 186 BPM
SINUS: 3 CM
STJ: 2.5 CM
STRESS TARGET HR: 158 BPM

## 2019-06-24 ENCOUNTER — ANTICOAGULATION VISIT (OUTPATIENT)
Dept: PHARMACY | Facility: HOSPITAL | Age: 34
End: 2019-06-24

## 2019-06-24 DIAGNOSIS — Z79.01 ANTICOAGULATED ON COUMADIN: ICD-10-CM

## 2019-06-24 LAB
INR PPP: 2.2 (ref 0.91–1.09)
PROTHROMBIN TIME: 26.3 SECONDS (ref 10–13.8)

## 2019-06-24 PROCEDURE — 85610 PROTHROMBIN TIME: CPT

## 2019-06-24 PROCEDURE — 36416 COLLJ CAPILLARY BLOOD SPEC: CPT

## 2019-06-24 NOTE — PROGRESS NOTES
Anticoagulation Clinic Progress Note    Anticoagulation Summary  As of 2019    INR goal:   2.0-3.0   TTR:   32.5 % (8.1 mo)   INR used for dosin.2 (2019)   Warfarin maintenance plan:   7.5 mg every Fri; 5 mg all other days   Weekly warfarin total:   37.5 mg   No change documented:   Maame Santos   Plan last modified:   Rebekah Villa RPH (2019)   Next INR check:   2019   Priority:   Maintenance   Target end date:   Indefinite    Indications    Anticoagulated on Coumadin [Z51.81  Z79.01]  DVT (deep venous thrombosis) (CMS/[facility]) [I82.409] [I82.409]             Anticoagulation Episode Summary     INR check location:       Preferred lab:       Send INR reminders to:   [facility]    Comments:         Anticoagulation Care Providers     Provider Role Specialty Phone number    Lynda Serrato MD Referring Cardiology [phone]          Clinic Interview:  Patient Findings     Negatives:   Signs/symptoms of thrombosis, Signs/symptoms of bleeding,   Laboratory test error suspected, Change in health, Change in alcohol use,   Change in activity, Upcoming invasive procedure, Emergency department   visit, Upcoming dental procedure, Missed doses, Extra doses, Change in   medications, Change in diet/appetite, Hospital admission, Bruising, Other   complaints      Clinical Outcomes     Negatives:   Major bleeding event, Thromboembolic event,   Anticoagulation-related hospital admission, Anticoagulation-related ED   visit, Anticoagulation-related fatality        INR History:  Anticoagulation Monitoring 2019 6/10/2019 2019   INR 2.3 2.5 2.2   INR Date 2019 6/10/2019 2019   INR Goal 2.0-3.0 2.0-3.0 2.0-3.0   Trend Same Same Same   Last Week Total 37.5 mg 37.5 mg 37.5 mg   Next Week Total 37.5 mg 37.5 mg 37.5 mg   Sun 5 mg 5 mg 5 mg   Mon 5 mg 5 mg 5 mg   Tue 5 mg 5 mg 5 mg   Wed 5 mg 5 mg 5 mg   Thu 5 mg 5 mg 5 mg   Fri 7.5 mg 7.5 mg 7.5 mg   Sat 5 mg 5 mg 5 mg   Visit Report  - - -   Some recent data might be hidden       Plan:  1. INR is therapeutic today- see above in Anticoagulation Summary.   Will instruct Scott Allen to continue their warfarin regimen- see above in Anticoagulation Summary.  2. Follow up in 4 weeks.  3. Patient declines warfarin refills.  4. Verbal and written information provided. Patient expresses understanding and has no further questions at this time.    Maame Santos

## 2019-06-25 DIAGNOSIS — E03.9 HYPOTHYROIDISM, UNSPECIFIED TYPE: ICD-10-CM

## 2019-06-26 RX ORDER — LEVOTHYROXINE SODIUM 88 MCG
TABLET ORAL
Qty: 90 TABLET | Refills: 0 | Status: SHIPPED | OUTPATIENT
Start: 2019-06-26 | End: 2019-11-02 | Stop reason: SDUPTHER

## 2019-07-22 ENCOUNTER — ANTICOAGULATION VISIT (OUTPATIENT)
Dept: PHARMACY | Facility: HOSPITAL | Age: 34
End: 2019-07-22

## 2019-07-22 DIAGNOSIS — Z79.01 ANTICOAGULATED ON COUMADIN: ICD-10-CM

## 2019-07-22 LAB
INR PPP: 1.6 (ref 0.91–1.09)
PROTHROMBIN TIME: 19.1 SECONDS (ref 10–13.8)

## 2019-07-22 PROCEDURE — G0463 HOSPITAL OUTPT CLINIC VISIT: HCPCS

## 2019-07-22 PROCEDURE — 85610 PROTHROMBIN TIME: CPT

## 2019-07-22 PROCEDURE — 36416 COLLJ CAPILLARY BLOOD SPEC: CPT

## 2019-07-22 NOTE — PROGRESS NOTES
Anticoagulation Clinic Progress Note    Anticoagulation Summary  As of 2019    INR goal:   2.0-3.0   TTR:   32.6 % (9 mo)   INR used for dosin.6! (2019)   Warfarin maintenance plan:   7.5 mg every Fri; 5 mg all other days   Weekly warfarin total:   37.5 mg   Plan last modified:   Rebekah Villa RPH (2019)   Next INR check:   2019   Priority:   Maintenance   Target end date:   Indefinite    Indications    Anticoagulated on Coumadin [Z51.81  Z79.01]  DVT (deep venous thrombosis) (CMS/HCC) [I82.409] [I82.409]             Anticoagulation Episode Summary     INR check location:       Preferred lab:       Send INR reminders to:   [facility]    Comments:         Anticoagulation Care Providers     Provider Role Specialty Phone number    Lynda Serrato MD Referring Cardiology [phone]          Clinic Interview:  Patient Findings     Negatives:   Signs/symptoms of thrombosis, Signs/symptoms of bleeding,   Laboratory test error suspected, Change in health, Change in alcohol use,   Change in activity, Upcoming invasive procedure, Emergency department   visit, Upcoming dental procedure, Missed doses, Extra doses, Change in   medications, Change in diet/appetite, Hospital admission, Bruising, Other   complaints      Clinical Outcomes     Negatives:   Major bleeding event, Thromboembolic event,   Anticoagulation-related hospital admission, Anticoagulation-related ED   visit, Anticoagulation-related fatality        INR History:  Anticoagulation Monitoring 6/10/2019 2019 2019   INR 2.5 2.2 1.6   INR Date 6/10/2019 2019 2019   INR Goal 2.0-3.0 2.0-3.0 2.0-3.0   Trend Same Same Same   Last Week Total 37.5 mg 37.5 mg 37.5 mg   Next Week Total 37.5 mg 37.5 mg 42.5 mg   Sun 5 mg 5 mg 5 mg   Mon 5 mg 5 mg 10 mg (); Otherwise 5 mg   Tue 5 mg 5 mg 5 mg   Wed 5 mg 5 mg 5 mg   Thu 5 mg 5 mg 5 mg   Fri 7.5 mg 7.5 mg 7.5 mg   Sat 5 mg 5 mg 5 mg   Visit Report - - -   Some  recent data might be hidden       Plan:  1. INR is Subtherapeutic today- see above in Anticoagulation Summary.  Will instruct Scott Allen to boost to warfarin 10mg today, then continue their warfarin regimen- see above in Anticoagulation Summary.  2. Follow up in 2 weeks  3. Patient declines warfarin refills.  4. Verbal and written information provided. Patient expresses understanding and has no further questions at this time.    Rebekah Villa [facility]

## 2019-07-22 NOTE — PROGRESS NOTES
Anticoagulation Clinic Progress Note    Anticoagulation Summary  As of 2019    INR goal:   2.0-3.0   TTR:   32.6 % (9 mo)   INR used for dosin.6! (2019)   Warfarin maintenance plan:   7.5 mg every Fri; 5 mg all other days   Weekly warfarin total:   37.5 mg   Plan last modified:   Rebekah Villa RPH (2019)   Next INR check:   2019   Priority:   Maintenance   Target end date:   Indefinite    Indications    Anticoagulated on Coumadin [Z51.81  Z79.01]  DVT (deep venous thrombosis) (CMS/HCC) [I82.409] [I82.409]             Anticoagulation Episode Summary     INR check location:       Preferred lab:       Send INR reminders to:   [facility]    Comments:         Anticoagulation Care Providers     Provider Role Specialty Phone number    Lynda Serrato MD Referring Cardiology [phone]          Clinic Interview:  Patient Findings     Negatives:   Signs/symptoms of thrombosis, Signs/symptoms of bleeding,   Laboratory test error suspected, Change in health, Change in alcohol use,   Change in activity, Upcoming invasive procedure, Emergency department   visit, Upcoming dental procedure, Missed doses, Extra doses, Change in   medications, Change in diet/appetite, Hospital admission, Bruising, Other   complaints      Clinical Outcomes     Negatives:   Major bleeding event, Thromboembolic event,   Anticoagulation-related hospital admission, Anticoagulation-related ED   visit, Anticoagulation-related fatality        INR History:  Anticoagulation Monitoring 6/10/2019 2019 2019   INR 2.5 2.2 1.6   INR Date 6/10/2019 2019 2019   INR Goal 2.0-3.0 2.0-3.0 2.0-3.0   Trend Same Same Same   Last Week Total 37.5 mg 37.5 mg 37.5 mg   Next Week Total 37.5 mg 37.5 mg 42.5 mg   Sun 5 mg 5 mg 5 mg   Mon 5 mg 5 mg 10 mg (); Otherwise 5 mg   Tue 5 mg 5 mg 5 mg   Wed 5 mg 5 mg 5 mg   Thu 5 mg 5 mg 5 mg   Fri 7.5 mg 7.5 mg 7.5 mg   Sat 5 mg 5 mg 5 mg   Visit Report - - -   Some  recent data might be hidden       Plan:  1. INR is subtherapeutic today- see above in Anticoagulation Summary.   Will instruct Scott Allen to take boost dose of 10mg today (7/22), then resume as normal- see above in Anticoagulation Summary.  2. Follow up in 2 weeks.  3. Patient declines warfarin refills.  4. Verbal and written information provided. Patient expresses understanding and has no further questions at this time.    Rena Hall, Pharmacy Intern

## 2019-07-25 ENCOUNTER — HOSPITAL ENCOUNTER (OUTPATIENT)
Dept: SLEEP MEDICINE | Facility: HOSPITAL | Age: 34
End: 2019-07-25

## 2019-08-05 RX ORDER — WARFARIN SODIUM 5 MG/1
TABLET ORAL
Qty: 35 TABLET | Refills: 0 | OUTPATIENT
Start: 2019-08-05

## 2019-08-05 RX ORDER — WARFARIN SODIUM 5 MG/1
TABLET ORAL
Qty: 35 TABLET | Refills: 0 | Status: SHIPPED | OUTPATIENT
Start: 2019-08-05 | End: 2019-08-30 | Stop reason: SDUPTHER

## 2019-08-30 ENCOUNTER — ANTICOAGULATION VISIT (OUTPATIENT)
Dept: PHARMACY | Facility: HOSPITAL | Age: 34
End: 2019-08-30

## 2019-08-30 DIAGNOSIS — Z79.01 ANTICOAGULATED ON COUMADIN: ICD-10-CM

## 2019-08-30 LAB
INR PPP: 1.6 (ref 0.91–1.09)
INR PPP: 1.6 (ref 0.9–1.1)
PROTHROMBIN TIME: 19.6 SECONDS (ref 10–13.8)

## 2019-08-30 PROCEDURE — 36416 COLLJ CAPILLARY BLOOD SPEC: CPT

## 2019-08-30 PROCEDURE — 85610 PROTHROMBIN TIME: CPT

## 2019-08-30 PROCEDURE — G0463 HOSPITAL OUTPT CLINIC VISIT: HCPCS

## 2019-08-30 RX ORDER — WARFARIN SODIUM 5 MG/1
TABLET ORAL
Qty: 40 TABLET | Refills: 0 | Status: SHIPPED | OUTPATIENT
Start: 2019-08-30 | End: 2019-09-30 | Stop reason: SDUPTHER

## 2019-08-30 NOTE — PROGRESS NOTES
Anticoagulation Clinic Progress Note    Anticoagulation Summary  As of 2019    INR goal:   2.0-3.0   TTR:   28.5 % (10.3 mo)   INR used for dosin.60! (2019)   Warfarin maintenance plan:   7.5 mg every Mon, Fri; 5 mg all other days   Weekly warfarin total:   40 mg   Plan last modified:   Omer Condon RPH (2019)   Next INR check:   2019   Priority:   Maintenance   Target end date:   Indefinite    Indications    Anticoagulated on Coumadin [Z51.81  Z79.01]  DVT (deep venous thrombosis) (CMS/HCC) [I82.409] [I82.409]             Anticoagulation Episode Summary     INR check location:       Preferred lab:       Send INR reminders to:   [facility]    Comments:         Anticoagulation Care Providers     Provider Role Specialty Phone number    Lynda Serrato MD Referring Cardiology [phone]          Clinic Interview:  Pt findings negative    INR History:  Anticoagulation Monitoring 2019   INR 2.2 1.6 1.60   INR Date 2019   INR Goal 2.0-3.0 2.0-3.0 2.0-3.0   Trend Same Same Up   Last Week Total 37.5 mg 37.5 mg 37.5 mg   Next Week Total 37.5 mg 42.5 mg 42.5 mg   Sun 5 mg 5 mg 5 mg   Mon 5 mg 10 mg (); Otherwise 5 mg 7.5 mg   Tue 5 mg 5 mg 5 mg   Wed 5 mg 5 mg 5 mg   Thu 5 mg 5 mg 5 mg   Fri 7.5 mg 7.5 mg 10 mg (); Otherwise 7.5 mg   Sat 5 mg 5 mg 5 mg   Visit Report - - -   Some recent data might be hidden       Plan:  1. INR is Subtherapeutic today- see above in Anticoagulation Summary.   Will instruct Scott Allen to Increase their warfarin regimen to 7.5 mg on MF, with 10 mg boost today - see above in Anticoagulation Summary.  2. Follow up in ~1 week  3.  Pt has agreed to only be called if INR out of range. They have been instructed to call if any changes in medications, doses, concerns, etc. Patient expresses understanding and has no further questions at this time.    Omer Condon RPH

## 2019-09-09 ENCOUNTER — ANTICOAGULATION VISIT (OUTPATIENT)
Dept: PHARMACY | Facility: HOSPITAL | Age: 34
End: 2019-09-09

## 2019-09-09 ENCOUNTER — OFFICE VISIT (OUTPATIENT)
Dept: FAMILY MEDICINE CLINIC | Facility: CLINIC | Age: 34
End: 2019-09-09

## 2019-09-09 VITALS
RESPIRATION RATE: 16 BRPM | HEART RATE: 91 BPM | BODY MASS INDEX: 31.21 KG/M2 | SYSTOLIC BLOOD PRESSURE: 108 MMHG | HEIGHT: 70 IN | DIASTOLIC BLOOD PRESSURE: 72 MMHG | OXYGEN SATURATION: 99 % | WEIGHT: 218 LBS

## 2019-09-09 DIAGNOSIS — Z79.01 ANTICOAGULATED ON COUMADIN: ICD-10-CM

## 2019-09-09 DIAGNOSIS — Z00.00 ANNUAL PHYSICAL EXAM: Primary | ICD-10-CM

## 2019-09-09 DIAGNOSIS — L84 FOOT CALLUS: ICD-10-CM

## 2019-09-09 DIAGNOSIS — Z11.3 SCREENING FOR STD (SEXUALLY TRANSMITTED DISEASE): ICD-10-CM

## 2019-09-09 DIAGNOSIS — J30.2 SEASONAL ALLERGIES: ICD-10-CM

## 2019-09-09 LAB
ERYTHROCYTE [DISTWIDTH] IN BLOOD BY AUTOMATED COUNT: 13.1 % (ref 12.3–15.4)
HCT VFR BLD AUTO: 49.9 % (ref 37.5–51)
HGB BLD-MCNC: 16.1 G/DL (ref 13–17.7)
INR PPP: 2.4 (ref 0.91–1.09)
MCH RBC QN AUTO: 28.8 PG (ref 26.6–33)
MCHC RBC AUTO-ENTMCNC: 32.3 G/DL (ref 31.5–35.7)
MCV RBC AUTO: 89.3 FL (ref 79–97)
PLATELET # BLD AUTO: 171 10*3/MM3 (ref 140–450)
PROTHROMBIN TIME: 29.4 SECONDS (ref 10–13.8)
RBC # BLD AUTO: 5.59 10*6/MM3 (ref 4.14–5.8)
WBC # BLD AUTO: 3.73 10*3/MM3 (ref 3.4–10.8)

## 2019-09-09 PROCEDURE — 99395 PREV VISIT EST AGE 18-39: CPT | Performed by: NURSE PRACTITIONER

## 2019-09-09 PROCEDURE — 99213 OFFICE O/P EST LOW 20 MIN: CPT | Performed by: NURSE PRACTITIONER

## 2019-09-09 PROCEDURE — 85610 PROTHROMBIN TIME: CPT

## 2019-09-09 PROCEDURE — 36416 COLLJ CAPILLARY BLOOD SPEC: CPT

## 2019-09-09 RX ORDER — MOMETASONE FUROATE 50 UG/1
2 SPRAY, METERED NASAL DAILY
Qty: 1 EACH | Refills: 0
Start: 2019-09-09 | End: 2019-10-03 | Stop reason: SDUPTHER

## 2019-09-09 RX ORDER — LORATADINE 10 MG/1
10 TABLET ORAL DAILY
Qty: 30 TABLET | Refills: 0
Start: 2019-09-09 | End: 2020-04-08 | Stop reason: SDUPTHER

## 2019-09-09 NOTE — PATIENT INSTRUCTIONS
I will call you with your lab results.   Please call with any questions or concerns.     Annual Wellness Exam  Personal Prevention Plan of Service     Date of Office Visit:  2019  Encounter Provider:  DAVE Finley  Place of Service:  [facility]  Patient Name: Scott Allen  :  1985    As part of the Annual Wellness portion of your visit today, we are providing you with this personalized preventive plan of services (PPPS). This plan is based upon recommendations of the [place] Preventive Services Task Force (USPSTF) and the Advisory Committee on Immunization Practices (ACIP).    This lists the preventive care services that should be considered, and provides dates of when you are due. Items listed as completed are up-to-date and do not require any further intervention.    Health Maintenance   Topic Date Due   • INFLUENZA VACCINE  2019   • ANNUAL PHYSICAL  09/10/2020   • TDAP/TD VACCINES (2 - Td) 2021       No orders of the defined types were placed in this encounter.      Return in about 1 year (around 2020) for Annual.

## 2019-09-09 NOTE — PROGRESS NOTES
Anticoagulation Clinic Progress Note    Anticoagulation Summary  As of 2019    INR goal:   2.0-3.0   TTR:   29.2 % (10.6 mo)   INR used for dosin.4 (2019)   Warfarin maintenance plan:   7.5 mg every Mon, Fri; 5 mg all other days   Weekly warfarin total:   40 mg   No change documented:   Maame Santos   Plan last modified:   Omer Condon, [facility] (2019)   Next INR check:   2019   Priority:   Maintenance   Target end date:   Indefinite    Indications    Anticoagulated on Coumadin [Z51.81  Z79.01]  DVT (deep venous thrombosis) (CMS/[facility]) [I82.409] [I82.409]             Anticoagulation Episode Summary     INR check location:       Preferred lab:       Send INR reminders to:   [facility]    Comments:         Anticoagulation Care Providers     Provider Role Specialty Phone number    Lynda Serrato MD Referring Cardiology [phone]          Clinic Interview:  Patient Findings     Negatives:   Signs/symptoms of thrombosis, Signs/symptoms of bleeding,   Laboratory test error suspected, Change in health, Change in alcohol use,   Change in activity, Upcoming invasive procedure, Emergency department   visit, Upcoming dental procedure, Missed doses, Extra doses, Change in   medications, Change in diet/appetite, Hospital admission, Bruising, Other   complaints      Clinical Outcomes     Negatives:   Major bleeding event, Thromboembolic event,   Anticoagulation-related hospital admission, Anticoagulation-related ED   visit, Anticoagulation-related fatality        INR History:  Anticoagulation Monitoring 2019   INR 1.6 1.6 2.4   INR Date 2019   INR Goal 2.0-3.0 2.0-3.0 2.0-3.0   Trend Same Up Same   Last Week Total 37.5 mg 37.5 mg 40 mg   Next Week Total 42.5 mg 42.5 mg 40 mg   Sun 5 mg 5 mg 5 mg   Mon 10 mg (); Otherwise 5 mg 7.5 mg 7.5 mg   Tue 5 mg 5 mg 5 mg   Wed 5 mg 5 mg 5 mg   Thu 5 mg 5 mg 5 mg   Fri 7.5 mg 10 mg ();  Otherwise 7.5 mg 7.5 mg   Sat 5 mg 5 mg 5 mg   Visit Report - - -   Some recent data might be hidden       Plan:  1. INR is therapeutic today- see above in Anticoagulation Summary.   Will instruct Scott Allen to continue their warfarin regimen- see above in Anticoagulation Summary.  2. Follow up in 3 weeks.  3. Patient declines warfarin refills.  4. Verbal and written information provided. Patient expresses understanding and has no further questions at this time.    Maame Santos

## 2019-09-09 NOTE — PROGRESS NOTES
"      Scott Allen  is a 34 y.o. male.   He is here for check up and RHM. He was previously seen by Dr. Kothari, but is new to me.     History of Present Illness     Pt presents with c/o of seasonal allergies. He states that he's had issues for years, but this year has been particularly worse. Pt is not currently taking any medications for this. Pt requesting ENT referral for seasonal allergies. Pt reports that he's very sensitive to medications and was not sure what to take.     Pt is also c/o of callus on bottom of left foot. He states that they are painful and hurt with walking. He states that he's tried home remedies for these, however, these have not worked.     Pt is also requesting HIV testing at visit. He states that he is not especially worried, but wants to check as a precaution.     The following portions of the patient's history were reviewed and updated as appropriate: allergies, current medications, past family history, past medical history, past social history, past surgical history and problem list.    Review of Systems   Constitutional: Negative.    HENT:  Reports intermittent tinnitus in left ear.   Eyes: Negative.  Wears glasses.   Respiratory: Negative.    Cardiovascular: Negative.    Gastrointestinal: Negative.    Endocrine: Negative.    Genitourinary: Negative.    Musculoskeletal: Negative.   Skin: Negative.    Allergic/Immunologic: Negative.    Neurological: Negative.    Hematological: Bruises easily due to warfarin.    Psychiatric/Behavioral: Reports anxiety is better.       All other systems reviewed and are negative.          Vitals:    09/09/19 0948   BP: 108/72   Pulse: 91   Resp: 16   SpO2: 99%   Weight: 98.9 kg (218 lb)   Height: 177.8 cm (70\")     Physical Exam   Constitutional: He is oriented to person, place, and time. He appears well-developed and well-nourished.   HENT:   Head: Normocephalic and atraumatic.   Eyes: Conjunctivae and EOM are normal. Pupils are equal, round, and " reactive to light.   Neck: Normal range of motion. Neck supple. No thyromegaly present.   Cardiovascular: Normal rate, regular rhythm, normal heart sounds and intact distal pulses.  Exam reveals no gallop and no friction rub.    No murmur heard.  Pulmonary/Chest: Effort normal and breath sounds normal. No respiratory distress. He has no wheezes. He has no rales. He exhibits no tenderness.   Abdominal: Soft, non-tender, non-distended.   Musculoskeletal: Upper and Lower extremities have normal range of motion. He exhibits no edema.   Neurological: He is alert and oriented to person, place, and time.   Skin: Skin is warm and dry. No rash noted. Left foot with callus or possibly plantar warts.   Psychiatric: He has a normal mood and affect. His behavior is normal. Judgment and thought content normal.   Nursing note and vitals reviewed.          Scott Allen was seen today for annual exam.  1. Annual physical exam  - CBC (No Diff)  - Lipid Panel With / Chol / HDL Ratio  - Comprehensive Metabolic Panel  - TSH  - HIV-1/O/2 Ag/Ab w Reflex    2. Seasonal allergies  - Ambulatory Referral to ENT (Otolaryngology)  - mometasone (NASONEX) 50 MCG/ACT nasal spray; 2 sprays into the nostril(s) as directed by provider Daily.  Dispense: 1 each; Refill: 0  - loratadine (CLARITIN) 10 MG tablet; Take 1 tablet by mouth Daily.  Dispense: 30 tablet; Refill: 0    3. Foot callus    - Ambulatory Referral to Podiatry    4. Screening for STD (sexually transmitted disease)  - HIV-1/O/2 Ag/Ab w Reflex    Orders Placed This Encounter   Procedures   • CBC (No Diff)   • Lipid Panel With / Chol / HDL Ratio   • Comprehensive Metabolic Panel   • TSH   • HIV-1/O/2 Ag/Ab w Reflex   • Ambulatory Referral to ENT (Otolaryngology)     Referral Priority:   Routine     Referral Type:   Consultation     Referral Reason:   Specialty Services Required     Referred to Provider:   Elier Cole MD     Requested Specialty:   Otolaryngology     Number of  Visits Requested:   1   • Ambulatory Referral to Podiatry     Referral Priority:   Routine     Referral Type:   Consultation     Referral Reason:   Specialty Services Required     Referred to Provider:   Shashi Houser DPM     Requested Specialty:   Podiatry     Number of Visits Requested:   1       Return in about 1 year (around 9/9/2020) for Annual.     15 additional minutes spent with patient due to seasonal allergies and foot callus.   Counseled patient on healthy diet and exercise.

## 2019-09-10 LAB
ALBUMIN SERPL-MCNC: 4.5 G/DL (ref 3.5–5.2)
ALBUMIN/GLOB SERPL: 2 G/DL
ALP SERPL-CCNC: 47 U/L (ref 39–117)
ALT SERPL-CCNC: 21 U/L (ref 1–41)
AST SERPL-CCNC: 21 U/L (ref 1–40)
BILIRUB SERPL-MCNC: 0.4 MG/DL (ref 0.2–1.2)
BUN SERPL-MCNC: 17 MG/DL (ref 6–20)
BUN/CREAT SERPL: 17.7 (ref 7–25)
CALCIUM SERPL-MCNC: 9 MG/DL (ref 8.6–10.5)
CHLORIDE SERPL-SCNC: 103 MMOL/L (ref 98–107)
CHOLEST SERPL-MCNC: 164 MG/DL (ref 0–200)
CHOLEST/HDLC SERPL: 4.21 {RATIO}
CO2 SERPL-SCNC: 27.7 MMOL/L (ref 22–29)
CREAT SERPL-MCNC: 0.96 MG/DL (ref 0.76–1.27)
GLOBULIN SER CALC-MCNC: 2.3 GM/DL
GLUCOSE SERPL-MCNC: 103 MG/DL (ref 65–99)
HDLC SERPL-MCNC: 39 MG/DL (ref 40–60)
HIV 1+2 AB+HIV1 P24 AG SERPL QL IA: NON REACTIVE
LDLC SERPL CALC-MCNC: 107 MG/DL (ref 0–100)
POTASSIUM SERPL-SCNC: 4.7 MMOL/L (ref 3.5–5.2)
PROT SERPL-MCNC: 6.8 G/DL (ref 6–8.5)
SODIUM SERPL-SCNC: 141 MMOL/L (ref 136–145)
TRIGL SERPL-MCNC: 91 MG/DL (ref 0–150)
TSH SERPL DL<=0.005 MIU/L-ACNC: 3.14 UIU/ML (ref 0.27–4.2)
VLDLC SERPL CALC-MCNC: 18.2 MG/DL

## 2019-09-30 ENCOUNTER — ANTICOAGULATION VISIT (OUTPATIENT)
Dept: PHARMACY | Facility: HOSPITAL | Age: 34
End: 2019-09-30

## 2019-09-30 DIAGNOSIS — Z79.01 ANTICOAGULATED ON COUMADIN: ICD-10-CM

## 2019-09-30 LAB
INR PPP: 1.9 (ref 0.91–1.09)
PROTHROMBIN TIME: 22.9 SECONDS (ref 10–13.8)

## 2019-09-30 PROCEDURE — 36416 COLLJ CAPILLARY BLOOD SPEC: CPT

## 2019-09-30 PROCEDURE — G0463 HOSPITAL OUTPT CLINIC VISIT: HCPCS

## 2019-09-30 PROCEDURE — 85610 PROTHROMBIN TIME: CPT

## 2019-09-30 RX ORDER — WARFARIN SODIUM 5 MG/1
TABLET ORAL
Qty: 40 TABLET | Refills: 1 | Status: SHIPPED | OUTPATIENT
Start: 2019-09-30 | End: 2020-01-14 | Stop reason: SDUPTHER

## 2019-09-30 NOTE — PROGRESS NOTES
Anticoagulation Clinic Progress Note    Anticoagulation Summary  As of 2019    INR goal:   2.0-3.0   TTR:   32.4 % (11.3 mo)   INR used for dosin.9! (2019)   Warfarin maintenance plan:   7.5 mg every Mon, Wed, Fri; 5 mg all other days   Weekly warfarin total:   42.5 mg   Plan last modified:   Jody Machado, Pharmacy Intern (2019)   Next INR check:   10/14/2019   Priority:   Maintenance   Target end date:   Indefinite    Indications    Anticoagulated on Coumadin [Z51.81  Z79.01]  DVT (deep venous thrombosis) (CMS/HCC) [I82.409] [I82.409]             Anticoagulation Episode Summary     INR check location:       Preferred lab:       Send INR reminders to:   [facility]    Comments:         Anticoagulation Care Providers     Provider Role Specialty Phone number    Lynda Serrato MD Referring Cardiology [phone]          Clinic Interview:  Patient Findings     Negatives:   Signs/symptoms of thrombosis, Signs/symptoms of bleeding,   Laboratory test error suspected, Change in health, Change in alcohol use,   Change in activity, Upcoming invasive procedure, Emergency department   visit, Upcoming dental procedure, Missed doses, Extra doses, Change in   medications, Change in diet/appetite, Hospital admission, Bruising, Other   complaints      Clinical Outcomes     Negatives:   Major bleeding event, Thromboembolic event,   Anticoagulation-related hospital admission, Anticoagulation-related ED   visit, Anticoagulation-related fatality        INR History:  Anticoagulation Monitoring 2019   INR 1.6 2.4 1.9   INR Date 2019   INR Goal 2.0-3.0 2.0-3.0 2.0-3.0   Trend Up Same Up   Last Week Total 37.5 mg 40 mg 40 mg   Next Week Total 42.5 mg 40 mg 42.5 mg   Sun 5 mg 5 mg 5 mg   Mon 7.5 mg 7.5 mg 7.5 mg   Tue 5 mg 5 mg 5 mg   Wed 5 mg 5 mg 7.5 mg   Thu 5 mg 5 mg 5 mg   Fri 10 mg (); Otherwise 7.5 mg 7.5 mg 7.5 mg   Sat 5 mg 5 mg 5 mg    Visit Report - - -   Some recent data might be hidden       Plan:  1. INR is Subtherapeutic today- see above in Anticoagulation Summary.  Will instruct Scott Allen to Change their warfarin regimen- see above in Anticoagulation Summary.  2. Follow up in 2 weeks  3. Patient declines warfarin refills.  4. Verbal and written information provided. Patient expresses understanding and has no further questions at this time.    Jody Machado, Pharmacy Intern

## 2019-09-30 NOTE — PROGRESS NOTES
I have supervised and reviewed the notes, assessments, and/or procedures performed by Jody Machado, PharmD Candidate. The documented assessment and plan were developed cooperatively, and the plan was implemented in my presence. I concur with her documentation of this patient.    Ruben Washington, [facility], located within [facility]

## 2019-10-03 DIAGNOSIS — J30.2 SEASONAL ALLERGIES: ICD-10-CM

## 2019-10-04 RX ORDER — MOMETASONE FUROATE 50 UG/1
2 SPRAY, METERED NASAL DAILY
Qty: 1 EACH | Refills: 2
Start: 2019-10-04 | End: 2019-12-27 | Stop reason: SDUPTHER

## 2019-10-28 ENCOUNTER — ANTICOAGULATION VISIT (OUTPATIENT)
Dept: PHARMACY | Facility: HOSPITAL | Age: 34
End: 2019-10-28

## 2019-10-28 DIAGNOSIS — Z79.01 ANTICOAGULATED ON COUMADIN: ICD-10-CM

## 2019-10-28 LAB
INR PPP: 3.2 (ref 0.91–1.09)
PROTHROMBIN TIME: 38.8 SECONDS (ref 10–13.8)

## 2019-10-28 PROCEDURE — 85610 PROTHROMBIN TIME: CPT

## 2019-10-28 PROCEDURE — 36416 COLLJ CAPILLARY BLOOD SPEC: CPT

## 2019-11-02 DIAGNOSIS — E03.9 HYPOTHYROIDISM, UNSPECIFIED TYPE: ICD-10-CM

## 2019-11-04 RX ORDER — LEVOTHYROXINE SODIUM 88 MCG
TABLET ORAL
Qty: 30 TABLET | Refills: 0 | Status: SHIPPED | OUTPATIENT
Start: 2019-11-04 | End: 2019-12-27 | Stop reason: SDUPTHER

## 2019-12-13 ENCOUNTER — ANTICOAGULATION VISIT (OUTPATIENT)
Dept: PHARMACY | Facility: HOSPITAL | Age: 34
End: 2019-12-13

## 2019-12-13 DIAGNOSIS — Z79.01 ANTICOAGULATED ON COUMADIN: ICD-10-CM

## 2019-12-13 LAB
INR PPP: 2.5 (ref 0.91–1.09)
PROTHROMBIN TIME: 30 SECONDS (ref 10–13.8)

## 2019-12-13 PROCEDURE — 85610 PROTHROMBIN TIME: CPT

## 2019-12-13 PROCEDURE — 36416 COLLJ CAPILLARY BLOOD SPEC: CPT

## 2019-12-13 NOTE — PROGRESS NOTES
Anticoagulation Clinic Progress Note    Anticoagulation Summary  As of 2019    INR goal:   2.0-3.0   TTR:   39.7 % (1.1 y)   INR used for dosin.5 (2019)   Warfarin maintenance plan:   7.5 mg every Mon, Wed, Fri; 5 mg all other days   Weekly warfarin total:   42.5 mg   No change documented:   Maritza Castelan   Plan last modified:   Jody Machado, Pharmacy Intern (2019)   Next INR check:   1/10/2020   Priority:   Maintenance   Target end date:   Indefinite    Indications    Anticoagulated on Coumadin [Z79.01]  DVT (deep venous thrombosis) (CMS/HCC) [I82.409] [I82.409]             Anticoagulation Episode Summary     INR check location:       Preferred lab:       Send INR reminders to:   [facility] [place]    Comments:         Anticoagulation Care Providers     Provider Role Specialty Phone number    Lynda Serrato MD Referring Cardiology [phone]          Clinic Interview:      INR History:  Anticoagulation Monitoring 2019 10/28/2019 2019   INR 1.9 3.2 2.5   INR Date 2019 10/28/2019 2019   INR Goal 2.0-3.0 2.0-3.0 2.0-3.0   Trend Up Same Same   Last Week Total 40 mg 42.5 mg 42.5 mg   Next Week Total 42.5 mg 42.5 mg 42.5 mg   Sun 5 mg 5 mg 5 mg   Mon 7.5 mg 7.5 mg 7.5 mg   Tue 5 mg 5 mg 5 mg   Wed 7.5 mg 7.5 mg 7.5 mg   Thu 5 mg 5 mg 5 mg   Fri 7.5 mg 7.5 mg 7.5 mg   Sat 5 mg 5 mg 5 mg   Visit Report - - -   Some recent data might be hidden       Plan:  1. INR is therapeutic today- see above in Anticoagulation Summary.   Will instruct Scott Allen to continue their warfarin regimen- see above in Anticoagulation Summary.  2. Follow up in 4 weeks.  3. Patient declines warfarin refills.  4. Verbal and written information provided. Patient expresses understanding and has no further questions at this time.    Maritza Castelan

## 2019-12-27 DIAGNOSIS — J30.2 SEASONAL ALLERGIES: ICD-10-CM

## 2019-12-27 DIAGNOSIS — E03.9 HYPOTHYROIDISM, UNSPECIFIED TYPE: ICD-10-CM

## 2019-12-27 RX ORDER — MOMETASONE FUROATE 50 UG/1
2 SPRAY, METERED NASAL DAILY
Qty: 1 EACH | Refills: 2
Start: 2019-12-27 | End: 2020-01-13 | Stop reason: SDUPTHER

## 2019-12-27 RX ORDER — LEVOTHYROXINE SODIUM 88 MCG
88 TABLET ORAL DAILY
Qty: 30 TABLET | Refills: 5 | Status: SHIPPED | OUTPATIENT
Start: 2019-12-27 | End: 2020-09-18 | Stop reason: SDUPTHER

## 2020-01-13 ENCOUNTER — OFFICE VISIT (OUTPATIENT)
Dept: FAMILY MEDICINE CLINIC | Facility: CLINIC | Age: 35
End: 2020-01-13

## 2020-01-13 VITALS
WEIGHT: 217.5 LBS | DIASTOLIC BLOOD PRESSURE: 78 MMHG | SYSTOLIC BLOOD PRESSURE: 122 MMHG | RESPIRATION RATE: 16 BRPM | HEIGHT: 70 IN | BODY MASS INDEX: 31.14 KG/M2 | HEART RATE: 78 BPM | OXYGEN SATURATION: 98 %

## 2020-01-13 DIAGNOSIS — J30.2 SEASONAL ALLERGIES: ICD-10-CM

## 2020-01-13 DIAGNOSIS — F41.9 ANXIETY: Primary | ICD-10-CM

## 2020-01-13 PROCEDURE — 99213 OFFICE O/P EST LOW 20 MIN: CPT | Performed by: NURSE PRACTITIONER

## 2020-01-13 RX ORDER — MOMETASONE FUROATE 50 UG/1
2 SPRAY, METERED NASAL DAILY
Qty: 1 EACH | Refills: 2
Start: 2020-01-13 | End: 2020-02-25

## 2020-01-13 RX ORDER — CITALOPRAM 10 MG/1
10 TABLET ORAL DAILY
Qty: 30 TABLET | Refills: 3 | Status: SHIPPED | OUTPATIENT
Start: 2020-01-13 | End: 2020-02-25

## 2020-01-13 NOTE — PROGRESS NOTES
Subjective  Answers for HPI/ROS submitted by the patient on 1/13/2020   How long have you been having these symptoms?: 1-4 weeks ago    Scott Allen is a 34 y.o. male.     History of Present Illness   Patient is here for anxiety, chronic, ongoing. He states that he has been tried on several SSRI medications, but has had nighttime twitching and change of mental status and increased anxiety.   He is not currently in counseling, we discussed at length and agree that patient should pursue this. He reports that increased stress at work has triggered his latest anxiety. Pt does admit to panic attacks and insomnia.     The following portions of the patient's history were reviewed and updated as appropriate: allergies, current medications, past family history, past medical history, past social history, past surgical history and problem list.    Review of Systems   Constitutional: Positive for fatigue. Negative for chills and fever.   Respiratory: Negative for cough, chest tightness and shortness of breath.    Cardiovascular: Negative for chest pain, palpitations and leg swelling.   Neurological: Negative for dizziness and headache.   Hematological: Negative.    Psychiatric/Behavioral: Positive for stress. Negative for sleep disturbance and suicidal ideas. The patient is nervous/anxious.        Objective   Physical Exam   Constitutional: He is oriented to person, place, and time. He appears well-developed and well-nourished.   HENT:   Head: Normocephalic and atraumatic.   Eyes: Pupils are equal, round, and reactive to light. Conjunctivae and EOM are normal.   Cardiovascular: Normal rate, regular rhythm, normal heart sounds and intact distal pulses.   No murmur heard.  Pulmonary/Chest: Effort normal and breath sounds normal.   Neurological: He is alert and oriented to person, place, and time.   Psychiatric: He has a normal mood and affect. His behavior is normal. Judgment and thought content normal.   Nursing note and vitals  reviewed.      Vitals:    01/13/20 1528   BP: 122/78   Pulse: 78   Resp: 16   SpO2: 98%     Body mass index is 31.21 kg/m².  Patient here for anxiety  Procedures    Assessment/Plan   Problems Addressed this Visit        Other    Anxiety - Primary    Relevant Medications    citalopram (CELEXA) 10 MG tablet  Referral to [facility]       Other Visit Diagnoses     Seasonal allergies        Relevant Medications    mometasone (NASONEX) 50 MCG/ACT nasal spray        Follow-up in 3 weeks to recheck anxiety.             Patient Instructions   Return in about 3 weeks (around 2/3/2020).    Call and make appointment for counseling.  Call me if you have any questions or concerns.     Cognitive Behavioral Therapy  Cognitive behavioral therapy (CBT) is a short-term, goal-oriented type of talk therapy. CBT can help you:  · Identify patterns of thinking, feeling, and behaving that are causing you problems.  · Decide how you want to think, feel, and respond to life events.  · Set goals to change the beliefs and thoughts that cause you to act in ways that are not helpful for you.  · Follow up on the changes that you make.  What are the different types of CBT?  The different types of CBT include:  · Dialectical behavioral therapy (DBT). This approach is often used in group therapy, and it aids a person in managing behavior by focusing on:  ? Things that cause problems to start (triggers).  ? Methods of self-calming.  ? Re-evaluating thinking processes.  · Mindfulness-based cognitive therapy. This approach involves focusing your attention, meditating, and developing awareness of the present moment (mindfulness).  · Rational emotive behavior therapy. This approach uses rational thought to reframe your thinking so it is less judgmental. Your therapist may directly challenge your thought processes.  · Stress inoculation training. This approach involves planning ahead for stressful situations by practicing new thoughts and  behaviors. This planning can help you avoid going back to old actions.  · Acceptance and commitment therapy (ACT). This approach focuses on accepting yourself as you are and practicing mindfulness. It helps you understand what you would like to change and how you can set goals in that direction.  What conditions is CBT used to treat?  CBT may help to treat:  · Mental health conditions, including:  ? Depression.  ? Anxiety.  ? Bipolar disorder.  ? Eating disorders.  ? Post-traumatic stress disorder (PTSD).  ? Obsessive-compulsive disorder (OCD).  · Insomnia and other sleep disorders.  · Pain.  · Stress.  · Coping with loss or grief.  · Coping with a difficult medical diagnosis or illness.  · Relationship problems.  · Emotional distress or shock (trauma).  How can CBT help me?  CBT may:  · Give you a chance to share your thoughts, feelings, problems, and fears in a safe space.  · Help you focus on specific problems.  · Give you homework that helps you put theory into practice. Homework may include keeping a journal or doing thinking exercises.  · Help you become aware of your patterns of thinking, feeling, and behaving, and how those three patterns affect each other.  · Change your thoughts so that you can change your behaviors.  · Help you chose how you want to view the world.  · Teach you planned coping skills and offer better ways to deal with stress and difficult situations.  To make the most of CBT, make sure you:  · Find a licensed therapist whom you trust.  · Take an active part in your therapy and do the homework that you are given.  · Are honest about your problems.  · Avoid skipping your therapy sessions.  Summary  · Cognitive behavioral therapy (CBT) is a short-term, goal-oriented type of talk therapy.  · CBT can help you become aware of your patterns and the relationships among your thoughts, feelings, and behavior.  · CBT may help mental health conditions and other problems.  This information is not  intended to replace advice given to you by your health care provider. Make sure you discuss any questions you have with your health care provider.  Document Released: 05/01/2018 Document Revised: 05/01/2018 Document Reviewed: 05/01/2018  [employer] Interactive Patient Education © 2019 [employer]  Living With Anxiety    After being diagnosed with an anxiety disorder, you may be relieved to know why you have felt or behaved a certain way. It is natural to also feel overwhelmed about the treatment ahead and what it will mean for your life. With care and support, you can manage this condition and recover from it.  How to cope with anxiety  Dealing with stress  Stress is your body’s reaction to life changes and events, both good and bad. Stress can last just a few hours or it can be ongoing. Stress can play a major role in anxiety, so it is important to learn both how to cope with stress and how to think about it differently.  Talk with your health care provider or a counselor to learn more about stress reduction. He or she may suggest some stress reduction techniques, such as:  · Music therapy. This can include creating or listening to music that you enjoy and that inspires you.  · Mindfulness-based meditation. This involves being aware of your normal breaths, rather than trying to control your breathing. It can be done while sitting or walking.  · Centering prayer. This is a kind of meditation that involves focusing on a word, phrase, or sacred image that is meaningful to you and that brings you peace.  · Deep breathing. To do this, expand your stomach and inhale slowly through your nose. Hold your breath for 3-5 seconds. Then exhale slowly, allowing your stomach muscles to relax.  · Self-talk. This is a skill where you identify thought patterns that lead to anxiety reactions and correct those thoughts.  · Muscle relaxation. This involves tensing muscles then relaxing them.  Choose a stress reduction technique that  fits your lifestyle and personality. Stress reduction techniques take time and practice. Set aside 5-15 minutes a day to do them. Therapists can offer training in these techniques. The training may be covered by some insurance plans. Other things you can do to manage stress include:  · Keeping a stress diary. This can help you learn what triggers your stress and ways to control your response.  · Thinking about how you respond to certain situations. You may not be able to control everything, but you can control your reaction.  · Making time for activities that help you relax, and not feeling guilty about spending your time in this way.  Therapy combined with coping and stress-reduction skills provides the best chance for successful treatment.  Medicines  Medicines can help ease symptoms. Medicines for anxiety include:  · Anti-anxiety drugs.  · Antidepressants.  · Beta-blockers.  Medicines may be used as the main treatment for anxiety disorder, along with therapy, or if other treatments are not working. Medicines should be prescribed by a health care provider.  Relationships  Relationships can play a big part in helping you recover. Try to spend more time connecting with trusted friends and family members. Consider going to couples counseling, taking family education classes, or going to family therapy. Therapy can help you and others better understand the condition.  How to recognize changes in your condition  Everyone has a different response to treatment for anxiety. Recovery from anxiety happens when symptoms decrease and stop interfering with your daily activities at home or work. This may mean that you will start to:  · Have better concentration and focus.  · Sleep better.  · Be less irritable.  · Have more energy.  · Have improved memory.  It is important to recognize when your condition is getting worse. Contact your health care provider if your symptoms interfere with home or work and you do not feel like  your condition is improving.  Where to find help and support:  You can get help and support from these sources:  · Self-help groups.  · Online and community organizations.  · A trusted spiritual leader.  · Couples counseling.  · Family education classes.  · Family therapy.  Follow these instructions at home:  · Eat a healthy diet that includes plenty of vegetables, fruits, whole grains, low-fat dairy products, and lean protein. Do not eat a lot of foods that are high in solid fats, added sugars, or salt.  · Exercise. Most adults should do the following:  ? Exercise for at least 150 minutes each week. The exercise should increase your heart rate and make you sweat (moderate-intensity exercise).  ? Strengthening exercises at least twice a week.  · Cut down on caffeine, tobacco, alcohol, and other potentially harmful substances.  · Get the right amount and quality of sleep. Most adults need 7-9 hours of sleep each night.  · Make choices that simplify your life.  · Take over-the-counter and prescription medicines only as told by your health care provider.  · Avoid caffeine, alcohol, and certain over-the-counter cold medicines. These may make you feel worse. Ask your pharmacist which medicines to avoid.  · Keep all follow-up visits as told by your health care provider. This is important.  Questions to ask your health care provider  · Would I benefit from therapy?  · How often should I follow up with a health care provider?  · How long do I need to take medicine?  · Are there any long-term side effects of my medicine?  · Are there any alternatives to taking medicine?  Contact a health care provider if:  · You have a hard time staying focused or finishing daily tasks.  · You spend many hours a day feeling worried about everyday life.  · You become exhausted by worry.  · You start to have headaches, feel tense, or have nausea.  · You urinate more than normal.  · You have diarrhea.  Get help right away if:  · You have a  racing heart and shortness of breath.  · You have thoughts of hurting yourself or others.  If you ever feel like you may hurt yourself or others, or have thoughts about taking your own life, get help right away. You can go to your nearest emergency department or call:  · Your local emergency services (911 in the [place]  · A suicide crisis helpline, such as the National Suicide Prevention Lifeline at 1-629.412.9968. This is open 24-hours a day.  Summary  · Taking steps to deal with stress can help calm you.  · Medicines cannot cure anxiety disorders, but they can help ease symptoms.  · Family, friends, and partners can play a big part in helping you recover from an anxiety disorder.  This information is not intended to replace advice given to you by your health care provider. Make sure you discuss any questions you have with your health care provider.  Document Released: 12/12/2017 Document Revised: 12/12/2017 Document Reviewed: 12/12/2017  [employer] Interactive Patient Education © 2019 [employer]

## 2020-01-13 NOTE — PATIENT INSTRUCTIONS
Return in about 3 weeks (around 2/3/2020).    Call and make appointment for counseling.  Call me if you have any questions or concerns.     Cognitive Behavioral Therapy  Cognitive behavioral therapy (CBT) is a short-term, goal-oriented type of talk therapy. CBT can help you:  · Identify patterns of thinking, feeling, and behaving that are causing you problems.  · Decide how you want to think, feel, and respond to life events.  · Set goals to change the beliefs and thoughts that cause you to act in ways that are not helpful for you.  · Follow up on the changes that you make.  What are the different types of CBT?  The different types of CBT include:  · Dialectical behavioral therapy (DBT). This approach is often used in group therapy, and it aids a person in managing behavior by focusing on:  ? Things that cause problems to start (triggers).  ? Methods of self-calming.  ? Re-evaluating thinking processes.  · Mindfulness-based cognitive therapy. This approach involves focusing your attention, meditating, and developing awareness of the present moment (mindfulness).  · Rational emotive behavior therapy. This approach uses rational thought to reframe your thinking so it is less judgmental. Your therapist may directly challenge your thought processes.  · Stress inoculation training. This approach involves planning ahead for stressful situations by practicing new thoughts and behaviors. This planning can help you avoid going back to old actions.  · Acceptance and commitment therapy (ACT). This approach focuses on accepting yourself as you are and practicing mindfulness. It helps you understand what you would like to change and how you can set goals in that direction.  What conditions is CBT used to treat?  CBT may help to treat:  · Mental health conditions, including:  ? Depression.  ? Anxiety.  ? Bipolar disorder.  ? Eating disorders.  ? Post-traumatic stress disorder (PTSD).  ? Obsessive-compulsive disorder  (OCD).  · Insomnia and other sleep disorders.  · Pain.  · Stress.  · Coping with loss or grief.  · Coping with a difficult medical diagnosis or illness.  · Relationship problems.  · Emotional distress or shock (trauma).  How can CBT help me?  CBT may:  · Give you a chance to share your thoughts, feelings, problems, and fears in a safe space.  · Help you focus on specific problems.  · Give you homework that helps you put theory into practice. Homework may include keeping a journal or doing thinking exercises.  · Help you become aware of your patterns of thinking, feeling, and behaving, and how those three patterns affect each other.  · Change your thoughts so that you can change your behaviors.  · Help you chose how you want to view the world.  · Teach you planned coping skills and offer better ways to deal with stress and difficult situations.  To make the most of CBT, make sure you:  · Find a licensed therapist whom you trust.  · Take an active part in your therapy and do the homework that you are given.  · Are honest about your problems.  · Avoid skipping your therapy sessions.  Summary  · Cognitive behavioral therapy (CBT) is a short-term, goal-oriented type of talk therapy.  · CBT can help you become aware of your patterns and the relationships among your thoughts, feelings, and behavior.  · CBT may help mental health conditions and other problems.  This information is not intended to replace advice given to you by your health care provider. Make sure you discuss any questions you have with your health care provider.  Document Released: 05/01/2018 Document Revised: 05/01/2018 Document Reviewed: 05/01/2018  [employer] Interactive Patient Education © 2019 [employer]  Living With Anxiety    After being diagnosed with an anxiety disorder, you may be relieved to know why you have felt or behaved a certain way. It is natural to also feel overwhelmed about the treatment ahead and what it will mean for your life. With  care and support, you can manage this condition and recover from it.  How to cope with anxiety  Dealing with stress  Stress is your body’s reaction to life changes and events, both good and bad. Stress can last just a few hours or it can be ongoing. Stress can play a major role in anxiety, so it is important to learn both how to cope with stress and how to think about it differently.  Talk with your health care provider or a counselor to learn more about stress reduction. He or she may suggest some stress reduction techniques, such as:  · Music therapy. This can include creating or listening to music that you enjoy and that inspires you.  · Mindfulness-based meditation. This involves being aware of your normal breaths, rather than trying to control your breathing. It can be done while sitting or walking.  · Centering prayer. This is a kind of meditation that involves focusing on a word, phrase, or sacred image that is meaningful to you and that brings you peace.  · Deep breathing. To do this, expand your stomach and inhale slowly through your nose. Hold your breath for 3-5 seconds. Then exhale slowly, allowing your stomach muscles to relax.  · Self-talk. This is a skill where you identify thought patterns that lead to anxiety reactions and correct those thoughts.  · Muscle relaxation. This involves tensing muscles then relaxing them.  Choose a stress reduction technique that fits your lifestyle and personality. Stress reduction techniques take time and practice. Set aside 5-15 minutes a day to do them. Therapists can offer training in these techniques. The training may be covered by some insurance plans. Other things you can do to manage stress include:  · Keeping a stress diary. This can help you learn what triggers your stress and ways to control your response.  · Thinking about how you respond to certain situations. You may not be able to control everything, but you can control your reaction.  · Making time for  activities that help you relax, and not feeling guilty about spending your time in this way.  Therapy combined with coping and stress-reduction skills provides the best chance for successful treatment.  Medicines  Medicines can help ease symptoms. Medicines for anxiety include:  · Anti-anxiety drugs.  · Antidepressants.  · Beta-blockers.  Medicines may be used as the main treatment for anxiety disorder, along with therapy, or if other treatments are not working. Medicines should be prescribed by a health care provider.  Relationships  Relationships can play a big part in helping you recover. Try to spend more time connecting with trusted friends and family members. Consider going to couples counseling, taking family education classes, or going to family therapy. Therapy can help you and others better understand the condition.  How to recognize changes in your condition  Everyone has a different response to treatment for anxiety. Recovery from anxiety happens when symptoms decrease and stop interfering with your daily activities at home or work. This may mean that you will start to:  · Have better concentration and focus.  · Sleep better.  · Be less irritable.  · Have more energy.  · Have improved memory.  It is important to recognize when your condition is getting worse. Contact your health care provider if your symptoms interfere with home or work and you do not feel like your condition is improving.  Where to find help and support:  You can get help and support from these sources:  · Self-help groups.  · Online and community organizations.  · A trusted spiritual leader.  · Couples counseling.  · Family education classes.  · Family therapy.  Follow these instructions at home:  · Eat a healthy diet that includes plenty of vegetables, fruits, whole grains, low-fat dairy products, and lean protein. Do not eat a lot of foods that are high in solid fats, added sugars, or salt.  · Exercise. Most adults should do the  following:  ? Exercise for at least 150 minutes each week. The exercise should increase your heart rate and make you sweat (moderate-intensity exercise).  ? Strengthening exercises at least twice a week.  · Cut down on caffeine, tobacco, alcohol, and other potentially harmful substances.  · Get the right amount and quality of sleep. Most adults need 7-9 hours of sleep each night.  · Make choices that simplify your life.  · Take over-the-counter and prescription medicines only as told by your health care provider.  · Avoid caffeine, alcohol, and certain over-the-counter cold medicines. These may make you feel worse. Ask your pharmacist which medicines to avoid.  · Keep all follow-up visits as told by your health care provider. This is important.  Questions to ask your health care provider  · Would I benefit from therapy?  · How often should I follow up with a health care provider?  · How long do I need to take medicine?  · Are there any long-term side effects of my medicine?  · Are there any alternatives to taking medicine?  Contact a health care provider if:  · You have a hard time staying focused or finishing daily tasks.  · You spend many hours a day feeling worried about everyday life.  · You become exhausted by worry.  · You start to have headaches, feel tense, or have nausea.  · You urinate more than normal.  · You have diarrhea.  Get help right away if:  · You have a racing heart and shortness of breath.  · You have thoughts of hurting yourself or others.  If you ever feel like you may hurt yourself or others, or have thoughts about taking your own life, get help right away. You can go to your nearest emergency department or call:  · Your local emergency services (911 in the [place]  · A suicide crisis helpline, such as the National Suicide Prevention Lifeline at 1-252.106.9137. This is open 24-hours a day.  Summary  · Taking steps to deal with stress can help calm you.  · Medicines cannot cure anxiety  disorders, but they can help ease symptoms.  · Family, friends, and partners can play a big part in helping you recover from an anxiety disorder.  This information is not intended to replace advice given to you by your health care provider. Make sure you discuss any questions you have with your health care provider.  Document Released: 12/12/2017 Document Revised: 12/12/2017 Document Reviewed: 12/12/2017  [employer] Interactive Patient Education © 2019 [employer]

## 2020-01-14 RX ORDER — WARFARIN SODIUM 5 MG/1
TABLET ORAL
Qty: 40 TABLET | Refills: 1 | Status: SHIPPED | OUTPATIENT
Start: 2020-01-14 | End: 2020-04-08 | Stop reason: SDUPTHER

## 2020-01-16 ENCOUNTER — TELEPHONE (OUTPATIENT)
Dept: PHARMACY | Facility: HOSPITAL | Age: 35
End: 2020-01-16

## 2020-01-21 ENCOUNTER — TELEPHONE (OUTPATIENT)
Dept: PHARMACY | Facility: HOSPITAL | Age: 35
End: 2020-01-21

## 2020-01-22 ENCOUNTER — TELEPHONE (OUTPATIENT)
Dept: FAMILY MEDICINE CLINIC | Facility: CLINIC | Age: 35
End: 2020-01-22

## 2020-01-22 NOTE — TELEPHONE ENCOUNTER
Pt called and stated Dr. Ayala asked him to call her back if he experienced any side effects from citalopram (CELEXA) 10 MG tablet.  Patient states that he is and wants to talk to Dr. Ayala.    Patient callback: [phone]

## 2020-01-22 NOTE — TELEPHONE ENCOUNTER
Left detailed message for patient to take medication at 6pm also advised it can take up to 2 weeks for medication side effects to go away

## 2020-01-29 ENCOUNTER — TELEPHONE (OUTPATIENT)
Dept: PHARMACY | Facility: HOSPITAL | Age: 35
End: 2020-01-29

## 2020-02-25 ENCOUNTER — OFFICE VISIT (OUTPATIENT)
Dept: FAMILY MEDICINE CLINIC | Facility: CLINIC | Age: 35
End: 2020-02-25

## 2020-02-25 VITALS
HEART RATE: 106 BPM | RESPIRATION RATE: 16 BRPM | SYSTOLIC BLOOD PRESSURE: 140 MMHG | BODY MASS INDEX: 31.55 KG/M2 | HEIGHT: 70 IN | WEIGHT: 220.4 LBS | TEMPERATURE: 99.3 F | DIASTOLIC BLOOD PRESSURE: 90 MMHG | OXYGEN SATURATION: 99 %

## 2020-02-25 DIAGNOSIS — Z20.828 EXPOSURE TO INFLUENZA: ICD-10-CM

## 2020-02-25 DIAGNOSIS — R68.89 FLU-LIKE SYMPTOMS: Primary | ICD-10-CM

## 2020-02-25 LAB
EXPIRATION DATE: NORMAL
FLUAV AG NPH QL: NEGATIVE
FLUBV AG NPH QL: NEGATIVE
INTERNAL CONTROL: NORMAL
Lab: NORMAL

## 2020-02-25 PROCEDURE — 87804 INFLUENZA ASSAY W/OPTIC: CPT | Performed by: NURSE PRACTITIONER

## 2020-02-25 PROCEDURE — 99213 OFFICE O/P EST LOW 20 MIN: CPT | Performed by: NURSE PRACTITIONER

## 2020-02-25 RX ORDER — MOMETASONE FUROATE 50 UG/1
2 SPRAY, METERED NASAL DAILY
Qty: 1 EACH | Refills: 2 | Status: SHIPPED | OUTPATIENT
Start: 2020-02-25 | End: 2020-04-14 | Stop reason: ALTCHOICE

## 2020-02-25 RX ORDER — OSELTAMIVIR PHOSPHATE 75 MG/1
75 CAPSULE ORAL DAILY
Qty: 10 CAPSULE | Refills: 0 | Status: SHIPPED | OUTPATIENT
Start: 2020-02-25 | End: 2020-03-06

## 2020-02-25 NOTE — PROGRESS NOTES
Subjective   Scott Allen is a 35 y.o. male.     History of Present Illness   Patient here for cough fever and body aches for two days. Pt reports that he had what he thought was a cold for the past two weeks, however, his symptoms worsened yesterday. Pt states that two of his co-workers have tested positive for flu B.     The following portions of the patient's history were reviewed and updated as appropriate: allergies, current medications, past family history, past medical history, past social history, past surgical history and problem list.    Review of Systems   Constitutional: Positive for chills, fatigue and fever. Negative for appetite change.   HENT: Positive for congestion, postnasal drip, sinus pressure and sore throat. Negative for ear pain, rhinorrhea and sneezing.    Eyes: Negative for redness and itching.   Respiratory: Positive for cough. Negative for chest tightness and shortness of breath.    Cardiovascular: Negative for chest pain, palpitations and leg swelling.   Musculoskeletal: Negative for myalgias.   Allergic/Immunologic: Negative.    Neurological: Positive for dizziness and headache.       Objective   Physical Exam   Constitutional: He is oriented to person, place, and time. He appears well-developed and well-nourished.   HENT:   Head: Normocephalic and atraumatic.   Right Ear: External ear and ear canal normal. A middle ear effusion is present.   Left Ear: External ear and ear canal normal. A middle ear effusion is present.   Nose: Rhinorrhea and congestion present. Right sinus exhibits no maxillary sinus tenderness and no frontal sinus tenderness. Left sinus exhibits no maxillary sinus tenderness and no frontal sinus tenderness.   Mouth/Throat: Uvula is midline and mucous membranes are normal. Oropharyngeal exudate, posterior oropharyngeal edema and posterior oropharyngeal erythema present. Tonsils are 1+ on the right. Tonsils are 1+ on the left. No tonsillar exudate.   Eyes: Pupils are  "equal, round, and reactive to light. Conjunctivae and EOM are normal. Right eye exhibits no discharge. Left eye exhibits no discharge.   Neck: Normal range of motion. Neck supple. No thyromegaly present.   Cardiovascular: Normal rate, regular rhythm, normal heart sounds and intact distal pulses.   No murmur heard.  Pulmonary/Chest: Effort normal and breath sounds normal. No tachypnea. No respiratory distress. He has no decreased breath sounds. He has no wheezes. He has no rales.   Lymphadenopathy:     He has no cervical adenopathy.   Neurological: He is alert and oriented to person, place, and time.   Skin: Skin is warm and dry.   Psychiatric: He has a normal mood and affect. His behavior is normal. Judgment and thought content normal.   Nursing note and vitals reviewed.      Vitals:    02/25/20 1255   BP: 140/90   Pulse: 106   Resp: 16   Temp: 99.3 °F (37.4 °C)   SpO2: 99%     Body mass index is 31.62 kg/m².    Procedures    Assessment/Plan   Problems Addressed this Visit     None      Visit Diagnoses     Flu-like symptoms    -  Primary    Relevant Medications    mometasone (NASONEX) 50 MCG/ACT nasal spray    Other Relevant Orders    POCT Influenza A/B    Exposure to influenza        Relevant Medications    oseltamivir (TAMIFLU) 75 MG capsule        Return if symptoms worsen or fail to improve.              Patient Instructions   If your symptoms worsen, you develop high fever and body aches take tamiflu twice daily, instead of once daily.  Return if symptoms worsen or fail to improve.  May continue current antihistamine as prescribed.     Influenza, Adult  Influenza, more commonly known as \"the flu,\" is a viral infection that mainly affects the respiratory tract. The respiratory tract includes organs that help you breathe, such as the lungs, nose, and throat. The flu causes many symptoms similar to the common cold along with high fever and body aches.  The flu spreads easily from person to person (is contagious). " Getting a flu shot (influenza vaccination) every year is the best way to prevent the flu.  What are the causes?  This condition is caused by the influenza virus. You can get the virus by:  · Breathing in droplets that are in the air from an infected person's cough or sneeze.  · Touching something that has been exposed to the virus (has been contaminated) and then touching your mouth, nose, or eyes.  What increases the risk?  The following factors may make you more likely to get the flu:  · Not washing or sanitizing your hands often.  · Having close contact with many people during cold and flu season.  · Touching your mouth, eyes, or nose without first washing or sanitizing your hands.  · Not getting a yearly (annual) flu shot.  You may have a higher risk for the flu, including serious problems such as a lung infection (pneumonia), if you:  · Are older than 65.  · Are pregnant.  · Have a weakened disease-fighting system (immune system). You may have a weakened immune system if you:  ? Have HIV or AIDS.  ? Are undergoing chemotherapy.  ? Are taking medicines that reduce (suppress) the activity of your immune system.  · Have a long-term (chronic) illness, such as heart disease, kidney disease, diabetes, or lung disease.  · Have a liver disorder.  · Are severely overweight (morbidly obese).  · Have anemia. This is a condition that affects your red blood cells.  · Have asthma.  What are the signs or symptoms?  Symptoms of this condition usually begin suddenly and last 4-14 days. They may include:  · Fever and chills.  · Headaches, body aches, or muscle aches.  · Sore throat.  · Cough.  · Runny or stuffy (congested) nose.  · Chest discomfort.  · Poor appetite.  · Weakness or fatigue.  · Dizziness.  · Nausea or vomiting.  How is this diagnosed?  This condition may be diagnosed based on:  · Your symptoms and medical history.  · A physical exam.  · Swabbing your nose or throat and testing the fluid for the influenza  virus.  How is this treated?  If the flu is diagnosed early, you can be treated with medicine that can help reduce how severe the illness is and how long it lasts (antiviral medicine). This may be given by mouth (orally) or through an IV.  Taking care of yourself at home can help relieve symptoms. Your health care provider may recommend:  · Taking over-the-counter medicines.  · Drinking plenty of fluids.  In many cases, the flu goes away on its own. If you have severe symptoms or complications, you may be treated in a hospital.  Follow these instructions at home:  Activity  · Rest as needed and get plenty of sleep.  · Stay home from work or school as told by your health care provider. Unless you are visiting your health care provider, avoid leaving home until your fever has been gone for 24 hours without taking medicine.  Eating and drinking  · Take an oral rehydration solution (ORS). This is a drink that is sold at pharmacies and retail stores.  · Drink enough fluid to keep your urine pale yellow.  · Drink clear fluids in small amounts as you are able. Clear fluids include water, ice chips, diluted fruit juice, and low-calorie sports drinks.  · Eat bland, easy-to-digest foods in small amounts as you are able. These foods include bananas, applesauce, rice, lean meats, toast, and crackers.  · Avoid drinking fluids that contain a lot of sugar or caffeine, such as energy drinks, regular sports drinks, and soda.  · Avoid alcohol.  · Avoid spicy or fatty foods.  General instructions         · Take over-the-counter and prescription medicines only as told by your health care provider.  · Use a cool mist humidifier to add humidity to the air in your home. This can make it easier to breathe.  · Cover your mouth and nose when you cough or sneeze.  · Wash your hands with soap and water often, especially after you cough or sneeze. If soap and water are not available, use alcohol-based hand .  · Keep all follow-up  "visits as told by your health care provider. This is important.  How is this prevented?    · Get an annual flu shot. You may get the flu shot in late summer, fall, or winter. Ask your health care provider when you should get your flu shot.  · Avoid contact with people who are sick during cold and flu season. This is generally fall and winter.  Contact a health care provider if:  · You develop new symptoms.  · You have:  ? Chest pain.  ? Diarrhea.  ? A fever.  · Your cough gets worse.  · You produce more mucus.  · You feel nauseous or you vomit.  Get help right away if:  · You develop shortness of breath or difficulty breathing.  · Your skin or nails turn a bluish color.  · You have severe pain or stiffness in your neck.  · You develop a sudden headache or sudden pain in your face or ear.  · You cannot eat or drink without vomiting.  Summary  · Influenza, more commonly known as \"the flu,\" is a viral infection that primarily affects your respiratory tract.  · Symptoms of the flu usually begin suddenly and last 4-14 days.  · Getting an annual flu shot is the best way to prevent getting the flu.  · Stay home from work or school as told by your health care provider. Unless you are visiting your health care provider, avoid leaving home until your fever has been gone for 24 hours without taking medicine.  · Keep all follow-up visits as told by your health care provider. This is important.  This information is not intended to replace advice given to you by your health care provider. Make sure you discuss any questions you have with your health care provider.  Document Released: 12/15/2001 Document Revised: 06/05/2019 Document Reviewed: 06/05/2019  [employer] Interactive Patient Education © 2020 [employer]        "

## 2020-02-25 NOTE — PATIENT INSTRUCTIONS
"If your symptoms worsen, you develop high fever and body aches take tamiflu twice daily, instead of once daily.  Return if symptoms worsen or fail to improve.  May continue current antihistamine as prescribed.     Influenza, Adult  Influenza, more commonly known as \"the flu,\" is a viral infection that mainly affects the respiratory tract. The respiratory tract includes organs that help you breathe, such as the lungs, nose, and throat. The flu causes many symptoms similar to the common cold along with high fever and body aches.  The flu spreads easily from person to person (is contagious). Getting a flu shot (influenza vaccination) every year is the best way to prevent the flu.  What are the causes?  This condition is caused by the influenza virus. You can get the virus by:  · Breathing in droplets that are in the air from an infected person's cough or sneeze.  · Touching something that has been exposed to the virus (has been contaminated) and then touching your mouth, nose, or eyes.  What increases the risk?  The following factors may make you more likely to get the flu:  · Not washing or sanitizing your hands often.  · Having close contact with many people during cold and flu season.  · Touching your mouth, eyes, or nose without first washing or sanitizing your hands.  · Not getting a yearly (annual) flu shot.  You may have a higher risk for the flu, including serious problems such as a lung infection (pneumonia), if you:  · Are older than 65.  · Are pregnant.  · Have a weakened disease-fighting system (immune system). You may have a weakened immune system if you:  ? Have HIV or AIDS.  ? Are undergoing chemotherapy.  ? Are taking medicines that reduce (suppress) the activity of your immune system.  · Have a long-term (chronic) illness, such as heart disease, kidney disease, diabetes, or lung disease.  · Have a liver disorder.  · Are severely overweight (morbidly obese).  · Have anemia. This is a condition that " affects your red blood cells.  · Have asthma.  What are the signs or symptoms?  Symptoms of this condition usually begin suddenly and last 4-14 days. They may include:  · Fever and chills.  · Headaches, body aches, or muscle aches.  · Sore throat.  · Cough.  · Runny or stuffy (congested) nose.  · Chest discomfort.  · Poor appetite.  · Weakness or fatigue.  · Dizziness.  · Nausea or vomiting.  How is this diagnosed?  This condition may be diagnosed based on:  · Your symptoms and medical history.  · A physical exam.  · Swabbing your nose or throat and testing the fluid for the influenza virus.  How is this treated?  If the flu is diagnosed early, you can be treated with medicine that can help reduce how severe the illness is and how long it lasts (antiviral medicine). This may be given by mouth (orally) or through an IV.  Taking care of yourself at home can help relieve symptoms. Your health care provider may recommend:  · Taking over-the-counter medicines.  · Drinking plenty of fluids.  In many cases, the flu goes away on its own. If you have severe symptoms or complications, you may be treated in a hospital.  Follow these instructions at home:  Activity  · Rest as needed and get plenty of sleep.  · Stay home from work or school as told by your health care provider. Unless you are visiting your health care provider, avoid leaving home until your fever has been gone for 24 hours without taking medicine.  Eating and drinking  · Take an oral rehydration solution (ORS). This is a drink that is sold at pharmacies and retail stores.  · Drink enough fluid to keep your urine pale yellow.  · Drink clear fluids in small amounts as you are able. Clear fluids include water, ice chips, diluted fruit juice, and low-calorie sports drinks.  · Eat bland, easy-to-digest foods in small amounts as you are able. These foods include bananas, applesauce, rice, lean meats, toast, and crackers.  · Avoid drinking fluids that contain a lot of  "sugar or caffeine, such as energy drinks, regular sports drinks, and soda.  · Avoid alcohol.  · Avoid spicy or fatty foods.  General instructions         · Take over-the-counter and prescription medicines only as told by your health care provider.  · Use a cool mist humidifier to add humidity to the air in your home. This can make it easier to breathe.  · Cover your mouth and nose when you cough or sneeze.  · Wash your hands with soap and water often, especially after you cough or sneeze. If soap and water are not available, use alcohol-based hand .  · Keep all follow-up visits as told by your health care provider. This is important.  How is this prevented?    · Get an annual flu shot. You may get the flu shot in late summer, fall, or winter. Ask your health care provider when you should get your flu shot.  · Avoid contact with people who are sick during cold and flu season. This is generally fall and winter.  Contact a health care provider if:  · You develop new symptoms.  · You have:  ? Chest pain.  ? Diarrhea.  ? A fever.  · Your cough gets worse.  · You produce more mucus.  · You feel nauseous or you vomit.  Get help right away if:  · You develop shortness of breath or difficulty breathing.  · Your skin or nails turn a bluish color.  · You have severe pain or stiffness in your neck.  · You develop a sudden headache or sudden pain in your face or ear.  · You cannot eat or drink without vomiting.  Summary  · Influenza, more commonly known as \"the flu,\" is a viral infection that primarily affects your respiratory tract.  · Symptoms of the flu usually begin suddenly and last 4-14 days.  · Getting an annual flu shot is the best way to prevent getting the flu.  · Stay home from work or school as told by your health care provider. Unless you are visiting your health care provider, avoid leaving home until your fever has been gone for 24 hours without taking medicine.  · Keep all follow-up visits as told by " your health care provider. This is important.  This information is not intended to replace advice given to you by your health care provider. Make sure you discuss any questions you have with your health care provider.  Document Released: 12/15/2001 Document Revised: 06/05/2019 Document Reviewed: 06/05/2019  [employer] Interactive Patient Education © 2020 [employer]

## 2020-02-26 ENCOUNTER — TELEPHONE (OUTPATIENT)
Dept: FAMILY MEDICINE CLINIC | Facility: CLINIC | Age: 35
End: 2020-02-26

## 2020-02-26 NOTE — TELEPHONE ENCOUNTER
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    CB@ [phone]

## 2020-02-28 ENCOUNTER — OFFICE VISIT (OUTPATIENT)
Dept: FAMILY MEDICINE CLINIC | Facility: CLINIC | Age: 35
End: 2020-02-28

## 2020-02-28 ENCOUNTER — ANTICOAGULATION VISIT (OUTPATIENT)
Dept: PHARMACY | Facility: HOSPITAL | Age: 35
End: 2020-02-28

## 2020-02-28 VITALS
OXYGEN SATURATION: 98 % | TEMPERATURE: 98.1 F | BODY MASS INDEX: 31.5 KG/M2 | SYSTOLIC BLOOD PRESSURE: 120 MMHG | HEIGHT: 70 IN | RESPIRATION RATE: 16 BRPM | DIASTOLIC BLOOD PRESSURE: 70 MMHG | WEIGHT: 220 LBS | HEART RATE: 87 BPM

## 2020-02-28 DIAGNOSIS — Z79.01 ANTICOAGULATED ON COUMADIN: ICD-10-CM

## 2020-02-28 DIAGNOSIS — H10.33 ACUTE BACTERIAL CONJUNCTIVITIS OF BOTH EYES: Primary | ICD-10-CM

## 2020-02-28 LAB
INR PPP: 2.2 (ref 0.91–1.09)
PROTHROMBIN TIME: 26.2 SECONDS (ref 10–13.8)

## 2020-02-28 PROCEDURE — 99213 OFFICE O/P EST LOW 20 MIN: CPT | Performed by: NURSE PRACTITIONER

## 2020-02-28 PROCEDURE — 85610 PROTHROMBIN TIME: CPT

## 2020-02-28 PROCEDURE — 36416 COLLJ CAPILLARY BLOOD SPEC: CPT

## 2020-02-28 RX ORDER — POLYMYXIN B SULFATE AND TRIMETHOPRIM 1; 10000 MG/ML; [USP'U]/ML
1 SOLUTION OPHTHALMIC EVERY 4 HOURS
Qty: 1 EACH | Refills: 0 | Status: SHIPPED | OUTPATIENT
Start: 2020-02-28 | End: 2020-04-14

## 2020-02-28 NOTE — PROGRESS NOTES
Anticoagulation Clinic Progress Note    Anticoagulation Summary  As of 2020    INR goal:   2.0-3.0   TTR:   49.1 % (1.3 y)   INR used for dosin.2 (2020)   Warfarin maintenance plan:   7.5 mg every Mon, Wed, Fri; 5 mg all other days   Weekly warfarin total:   42.5 mg   No change documented:   Maritza Castelan   Plan last modified:   Jody Machado, Pharmacy Intern (2019)   Next INR check:   3/27/2020   Priority:   Maintenance   Target end date:   Indefinite    Indications    Anticoagulated on Coumadin [Z79.01]  DVT (deep venous thrombosis) (CMS/HCC) [I82.409] [I82.409]             Anticoagulation Episode Summary     INR check location:       Preferred lab:       Send INR reminders to:   [facility]    Comments:         Anticoagulation Care Providers     Provider Role Specialty Phone number    Lynda Serrato MD Referring Cardiology [phone]          Clinic Interview:      INR History:  Anticoagulation Monitoring 10/28/2019 2019 2020   INR 3.2 2.5 2.2   INR Date 10/28/2019 2019 2020   INR Goal 2.0-3.0 2.0-3.0 2.0-3.0   Trend Same Same Same   Last Week Total 42.5 mg 42.5 mg 42.5 mg   Next Week Total 42.5 mg 42.5 mg 42.5 mg   Sun 5 mg 5 mg 5 mg   Mon 7.5 mg 7.5 mg 7.5 mg   Tue 5 mg 5 mg 5 mg   Wed 7.5 mg 7.5 mg 7.5 mg   Thu 5 mg 5 mg 5 mg   Fri 7.5 mg 7.5 mg 7.5 mg   Sat 5 mg 5 mg 5 mg   Visit Report - - -   Some recent data might be hidden       Plan:  1. INR is therapeutic today- see above in Anticoagulation Summary.   Will instruct Scott Allen to continue their warfarin regimen- see above in Anticoagulation Summary.  2. Follow up in 4 weeks.  3. Patient declines warfarin refills.  4. Verbal and written information provided. Patient expresses understanding and has no further questions at this time.    Maritza Castelan

## 2020-02-28 NOTE — PATIENT INSTRUCTIONS
Return if symptoms worsen or fail to improve.      Bacterial Conjunctivitis, Adult  Bacterial conjunctivitis is an infection of your conjunctiva. This is the clear membrane that covers the white part of your eye and the inner part of your eyelid. This infection can make your eye:  · Red or pink.  · Itchy.  This condition spreads easily from person to person (is contagious) and from one eye to the other eye.  What are the causes?  · This condition is caused by germs (bacteria). You may get the infection if you come into close contact with:  ? A person who has the infection.  ? Items that have germs on them (are contaminated), such as face towels, contact lens solution, or eye makeup.  What increases the risk?  You are more likely to get this condition if you:  · Have contact with people who have the infection.  · Wear contact lenses.  · Have a sinus infection.  · Have had a recent eye injury or surgery.  · Have a weak body defense system (immune system).  · Have dry eyes.  What are the signs or symptoms?    · Thick, yellowish discharge from the eye.  · Tearing or watery eyes.  · Itchy eyes.  · Burning feeling in your eyes.  · Eye redness.  · Swollen eyelids.  · Blurred vision.  How is this treated?    · Antibiotic eye drops or ointment.  · Antibiotic medicine taken by mouth. This is used for infections that do not get better with drops or ointment or that last more than 10 days.  · Cool, wet cloths placed on the eyes.  · Artificial tears used 2-6 times a day.  Follow these instructions at home:  Medicines  · Take or apply your antibiotic medicine as told by your doctor. Do not stop taking or applying the antibiotic even if you start to feel better.  · Take or apply over-the-counter and prescription medicines only as told by your doctor.  · Do not touch your eyelid with the eye-drop bottle or the ointment tube.  Managing discomfort  · Wipe any fluid from your eye with a warm, wet washcloth or a cotton ball.  · Place a  clean, cool, wet cloth on your eye. Do this for 10-20 minutes, 3-4 times per day.  General instructions  · Do not wear contacts until the infection is gone. Wear glasses until your doctor says it is okay to wear contacts again.  · Do not wear eye makeup until the infection is gone. Throw away old eye makeup.  · Change or wash your pillowcase every day.  · Do not share towels or washcloths.  · Wash your hands often with soap and water. Use paper towels to dry your hands.  · Do not touch or rub your eyes.  · Do not drive or use heavy machinery if your vision is blurred.  Contact a doctor if:  · You have a fever.  · You do not get better after 10 days.  Get help right away if:  · You have a fever and your symptoms get worse all of a sudden.  · You have very bad pain when you move your eye.  · Your face:  ? Hurts.  ? Is red.  ? Is swollen.  · You have sudden loss of vision.  Summary  · Bacterial conjunctivitis is an infection of your conjunctiva.  · This infection spreads easily from person to person.  · Wash your hands often with soap and water. Use paper towels to dry your hands.  · Take or apply your antibiotic medicine as told by your doctor.  · Contact a doctor if you have a fever or you do not get better after 10 days.  This information is not intended to replace advice given to you by your health care provider. Make sure you discuss any questions you have with your health care provider.  Document Released: 09/26/2009 Document Revised: 07/24/2019 Document Reviewed: 07/24/2019  [employer] Interactive Patient Education © 2020 [employer]

## 2020-02-28 NOTE — PROGRESS NOTES
Brigitte Allen is a 35 y.o. male.     History of Present Illness   Patient here for eye irritation that he states started last night. He does report that his eyes have crusted over last night and this am.     The following portions of the patient's history were reviewed and updated as appropriate: allergies, current medications, past family history, past medical history, past social history, past surgical history and problem list.    Review of Systems   Constitutional: Negative for appetite change, chills, fatigue and fever.   HENT: Positive for congestion and postnasal drip. Negative for ear pain, rhinorrhea, sinus pressure, sneezing and sore throat.    Eyes: Positive for discharge, redness and itching.   Respiratory: Positive for cough. Negative for chest tightness and shortness of breath.    Cardiovascular: Negative for chest pain, palpitations and leg swelling.   Musculoskeletal: Negative for myalgias.   Allergic/Immunologic: Negative.    Neurological: Negative for dizziness and headache.       Objective   Physical Exam   Constitutional: He is oriented to person, place, and time. He appears well-developed and well-nourished.   HENT:   Head: Normocephalic and atraumatic.   Right Ear: Tympanic membrane, external ear and ear canal normal.   Left Ear: Tympanic membrane, external ear and ear canal normal.   Nose: Rhinorrhea present. Right sinus exhibits no maxillary sinus tenderness and no frontal sinus tenderness. Left sinus exhibits frontal sinus tenderness. Left sinus exhibits no maxillary sinus tenderness.   Mouth/Throat: Uvula is midline and mucous membranes are normal. Oropharyngeal exudate, posterior oropharyngeal edema and posterior oropharyngeal erythema present. Tonsils are 2+ on the right. Tonsils are 2+ on the left. No tonsillar exudate.   Eyes: Pupils are equal, round, and reactive to light. EOM are normal. Right eye exhibits no discharge and no exudate. Left eye exhibits no discharge and no  exudate. Right conjunctiva is injected. Left conjunctiva is injected.   Neck: Normal range of motion. Neck supple. No thyromegaly present.   Cardiovascular: Normal rate, regular rhythm, normal heart sounds and intact distal pulses.   No murmur heard.  Pulmonary/Chest: Effort normal and breath sounds normal. No tachypnea. No respiratory distress. He has no decreased breath sounds. He has no wheezes. He has no rales.   Lymphadenopathy:     He has no cervical adenopathy.   Neurological: He is alert and oriented to person, place, and time.   Skin: Skin is warm and dry.   Psychiatric: He has a normal mood and affect. His behavior is normal. Judgment and thought content normal.   Nursing note and vitals reviewed.      Vitals:    02/28/20 0933   BP: 120/70   Pulse: 87   Resp: 16   Temp: 98.1 °F (36.7 °C)   SpO2: 98%     Body mass index is 31.57 kg/m².    Procedures    Assessment/Plan   Problems Addressed this Visit     None      Visit Diagnoses     Acute bacterial conjunctivitis of both eyes    -  Primary    Relevant Medications    trimethoprim-polymyxin b (POLYTRIM) 68987-9.1 UNIT/ML-% ophthalmic solution        Return if symptoms worsen or fail to improve.          Patient Instructions   Return if symptoms worsen or fail to improve.      Bacterial Conjunctivitis, Adult  Bacterial conjunctivitis is an infection of your conjunctiva. This is the clear membrane that covers the white part of your eye and the inner part of your eyelid. This infection can make your eye:  · Red or pink.  · Itchy.  This condition spreads easily from person to person (is contagious) and from one eye to the other eye.  What are the causes?  · This condition is caused by germs (bacteria). You may get the infection if you come into close contact with:  ? A person who has the infection.  ? Items that have germs on them (are contaminated), such as face towels, contact lens solution, or eye makeup.  What increases the risk?  You are more likely to get  this condition if you:  · Have contact with people who have the infection.  · Wear contact lenses.  · Have a sinus infection.  · Have had a recent eye injury or surgery.  · Have a weak body defense system (immune system).  · Have dry eyes.  What are the signs or symptoms?    · Thick, yellowish discharge from the eye.  · Tearing or watery eyes.  · Itchy eyes.  · Burning feeling in your eyes.  · Eye redness.  · Swollen eyelids.  · Blurred vision.  How is this treated?    · Antibiotic eye drops or ointment.  · Antibiotic medicine taken by mouth. This is used for infections that do not get better with drops or ointment or that last more than 10 days.  · Cool, wet cloths placed on the eyes.  · Artificial tears used 2-6 times a day.  Follow these instructions at home:  Medicines  · Take or apply your antibiotic medicine as told by your doctor. Do not stop taking or applying the antibiotic even if you start to feel better.  · Take or apply over-the-counter and prescription medicines only as told by your doctor.  · Do not touch your eyelid with the eye-drop bottle or the ointment tube.  Managing discomfort  · Wipe any fluid from your eye with a warm, wet washcloth or a cotton ball.  · Place a clean, cool, wet cloth on your eye. Do this for 10-20 minutes, 3-4 times per day.  General instructions  · Do not wear contacts until the infection is gone. Wear glasses until your doctor says it is okay to wear contacts again.  · Do not wear eye makeup until the infection is gone. Throw away old eye makeup.  · Change or wash your pillowcase every day.  · Do not share towels or washcloths.  · Wash your hands often with soap and water. Use paper towels to dry your hands.  · Do not touch or rub your eyes.  · Do not drive or use heavy machinery if your vision is blurred.  Contact a doctor if:  · You have a fever.  · You do not get better after 10 days.  Get help right away if:  · You have a fever and your symptoms get worse all of a  sudden.  · You have very bad pain when you move your eye.  · Your face:  ? Hurts.  ? Is red.  ? Is swollen.  · You have sudden loss of vision.  Summary  · Bacterial conjunctivitis is an infection of your conjunctiva.  · This infection spreads easily from person to person.  · Wash your hands often with soap and water. Use paper towels to dry your hands.  · Take or apply your antibiotic medicine as told by your doctor.  · Contact a doctor if you have a fever or you do not get better after 10 days.  This information is not intended to replace advice given to you by your health care provider. Make sure you discuss any questions you have with your health care provider.  Document Released: 09/26/2009 Document Revised: 07/24/2019 Document Reviewed: 07/24/2019  [employer] Interactive Patient Education © 2020 [employer]

## 2020-03-09 ENCOUNTER — OFFICE VISIT (OUTPATIENT)
Dept: FAMILY MEDICINE CLINIC | Facility: CLINIC | Age: 35
End: 2020-03-09

## 2020-03-09 VITALS
BODY MASS INDEX: 31.5 KG/M2 | SYSTOLIC BLOOD PRESSURE: 128 MMHG | DIASTOLIC BLOOD PRESSURE: 68 MMHG | RESPIRATION RATE: 14 BRPM | WEIGHT: 220 LBS | HEART RATE: 87 BPM | OXYGEN SATURATION: 98 % | HEIGHT: 70 IN

## 2020-03-09 DIAGNOSIS — J30.2 SEASONAL ALLERGIES: Primary | ICD-10-CM

## 2020-03-09 DIAGNOSIS — J02.9 SORE THROAT: ICD-10-CM

## 2020-03-09 LAB
EXPIRATION DATE: NORMAL
INTERNAL CONTROL: NORMAL
Lab: NORMAL
S PYO AG THROAT QL: NEGATIVE

## 2020-03-09 PROCEDURE — 87880 STREP A ASSAY W/OPTIC: CPT | Performed by: NURSE PRACTITIONER

## 2020-03-09 PROCEDURE — 99213 OFFICE O/P EST LOW 20 MIN: CPT | Performed by: NURSE PRACTITIONER

## 2020-03-09 NOTE — PATIENT INSTRUCTIONS
Flonase over the counter as directed.   Warm salt water gargles.  Tylenol or ibuprofen as needed for throat pain as directed.  Return if symptoms worsen or fail to improve.  Pharyngitis    Pharyngitis is redness, pain, and swelling (inflammation) of the throat (pharynx). It is a very common cause of sore throat. Pharyngitis can be caused by a bacteria, but it is usually caused by a virus. Most cases of pharyngitis get better on their own without treatment.  What are the causes?  This condition may be caused by:  · Infection by viruses (viral). Viral pharyngitis spreads from person to person (is contagious) through coughing, sneezing, and sharing of personal items or utensils such as cups, forks, spoons, and toothbrushes.  · Infection by bacteria (bacterial). Bacterial pharyngitis may be spread by touching the nose or face after coming in contact with the bacteria, or through more intimate contact, such as kissing.  · Allergies. Allergies can cause buildup of mucus in the throat (post-nasal drip), leading to inflammation and irritation. Allergies can also cause blocked nasal passages, forcing breathing through the mouth, which dries and irritates the throat.  What increases the risk?  You are more likely to develop this condition if:  · You are 5-24 years old.  · You are exposed to crowded environments such as , school, or dormitory living.  · You live in a cold climate.  · You have a weakened disease-fighting (immune) system.  What are the signs or symptoms?  Symptoms of this condition vary by the cause (viral, bacterial, or allergies) and can include:  · Sore throat.  · Fatigue.  · Low-grade fever.  · Headache.  · Joint pain and muscle aches.  · Skin rashes.  · Swollen glands in the throat (lymph nodes).  · Plaque-like film on the throat or tonsils. This is often a symptom of bacterial pharyngitis.  · Vomiting.  · Stuffy nose (nasal congestion).  · Cough.  · Red, itchy eyes (conjunctivitis).  · Loss of  appetite.  How is this diagnosed?  This condition is often diagnosed based on your medical history and a physical exam. Your health care provider will ask you questions about your illness and your symptoms. A swab of your throat may be done to check for bacteria (rapid strep test). Other lab tests may also be done, depending on the suspected cause, but these are rare.  How is this treated?  This condition usually gets better in 3-4 days without medicine. Bacterial pharyngitis may be treated with antibiotic medicines.  Follow these instructions at home:  · Take over-the-counter and prescription medicines only as told by your health care provider.  ? If you were prescribed an antibiotic medicine, take it as told by your health care provider. Do not stop taking the antibiotic even if you start to feel better.  ? Do not give children aspirin because of the association with Reye syndrome.  · Drink enough water and fluids to keep your urine clear or pale yellow.  · Get a lot of rest.  · Gargle with a salt-water mixture 3-4 times a day or as needed. To make a salt-water mixture, completely dissolve ½-1 tsp of salt in 1 cup of warm water.  · If your health care provider approves, you may use throat lozenges or sprays to soothe your throat.  Contact a health care provider if:  · You have large, tender lumps in your neck.  · You have a rash.  · You cough up green, yellow-brown, or bloody spit.  Get help right away if:  · Your neck becomes stiff.  · You drool or are unable to swallow liquids.  · You cannot drink or take medicines without vomiting.  · You have severe pain that does not go away, even after you take medicine.  · You have trouble breathing, and it is not caused by a stuffy nose.  · You have new pain and swelling in your joints such as the knees, ankles, wrists, or elbows.  Summary  · Pharyngitis is redness, pain, and swelling (inflammation) of the throat (pharynx).  · While pharyngitis can be caused by a bacteria,  the most common causes are viral.  · Most cases of pharyngitis get better on their own without treatment.  · Bacterial pharyngitis is treated with antibiotic medicines.  This information is not intended to replace advice given to you by your health care provider. Make sure you discuss any questions you have with your health care provider.  Document Released: 12/18/2006 Document Revised: 01/23/2018 Document Reviewed: 01/23/2018  [employer] Interactive Patient Education © 2020 [employer]

## 2020-03-09 NOTE — PROGRESS NOTES
"Brigitte Allen is a 35 y.o. male.     History of Present Illness   Patient here for sore throat and left ear pain that started about 4 days.  He is taking ibuprofen and takes claritin daily for his allergies.  He states that he has \"white plaque spots on both tonsils\". Pt denies any fever.     The following portions of the patient's history were reviewed and updated as appropriate: allergies, current medications, past family history, past medical history, past social history, past surgical history and problem list.    Review of Systems   Constitutional: Negative for appetite change, chills, fatigue and fever.   HENT: Positive for ear pain, postnasal drip and sore throat. Negative for congestion, rhinorrhea, sinus pressure and sneezing.    Eyes: Negative for redness and itching.   Respiratory: Negative for cough, chest tightness and shortness of breath.    Cardiovascular: Negative for chest pain, palpitations and leg swelling.   Musculoskeletal: Negative for myalgias.   Allergic/Immunologic: Negative.    Neurological: Negative for dizziness and headache.       Objective   Physical Exam   Constitutional: He is oriented to person, place, and time. He appears well-developed and well-nourished.   HENT:   Head: Normocephalic and atraumatic.   Right Ear: Tympanic membrane, external ear and ear canal normal.   Left Ear: External ear and ear canal normal. A middle ear effusion is present.   Nose: Nose normal. No rhinorrhea.   Mouth/Throat: Uvula is midline. Oropharyngeal exudate, posterior oropharyngeal edema and posterior oropharyngeal erythema present. Tonsils are 2+ on the right. Tonsils are 2+ on the left. Tonsillar exudate.   Eyes: Pupils are equal, round, and reactive to light. Conjunctivae and EOM are normal. Right eye exhibits no discharge. Left eye exhibits no discharge.   Neck: Normal range of motion. Neck supple. No thyromegaly present.   Cardiovascular: Normal rate, regular rhythm, normal heart sounds " and intact distal pulses.   No murmur heard.  Pulmonary/Chest: Effort normal and breath sounds normal. No tachypnea. No respiratory distress. He has no decreased breath sounds. He has no wheezes. He has no rales.   Lymphadenopathy:     He has no cervical adenopathy.   Neurological: He is alert and oriented to person, place, and time.   Skin: Skin is warm and dry.   Psychiatric: He has a normal mood and affect. His behavior is normal. Judgment and thought content normal.   Nursing note and vitals reviewed.      Vitals:    03/09/20 1502   BP: 128/68   Pulse: 87   Resp: 14   SpO2: 98%     Body mass index is 31.57 kg/m².    Procedures    Assessment/Plan   Problems Addressed this Visit     None      Visit Diagnoses     Seasonal allergies    -  Primary    Relevant Orders    Ambulatory Referral to Allergy    Sore throat        Relevant Orders    POC Rapid Strep A (Completed)      negative strep test.    Return if symptoms worsen or fail to improve.            Patient Instructions   Flonase over the counter as directed.   Warm salt water gargles.  Tylenol or ibuprofen as needed for throat pain as directed.  Return if symptoms worsen or fail to improve.  Pharyngitis    Pharyngitis is redness, pain, and swelling (inflammation) of the throat (pharynx). It is a very common cause of sore throat. Pharyngitis can be caused by a bacteria, but it is usually caused by a virus. Most cases of pharyngitis get better on their own without treatment.  What are the causes?  This condition may be caused by:  · Infection by viruses (viral). Viral pharyngitis spreads from person to person (is contagious) through coughing, sneezing, and sharing of personal items or utensils such as cups, forks, spoons, and toothbrushes.  · Infection by bacteria (bacterial). Bacterial pharyngitis may be spread by touching the nose or face after coming in contact with the bacteria, or through more intimate contact, such as kissing.  · Allergies. Allergies can  cause buildup of mucus in the throat (post-nasal drip), leading to inflammation and irritation. Allergies can also cause blocked nasal passages, forcing breathing through the mouth, which dries and irritates the throat.  What increases the risk?  You are more likely to develop this condition if:  · You are 5-24 years old.  · You are exposed to crowded environments such as , school, or dormitory living.  · You live in a cold climate.  · You have a weakened disease-fighting (immune) system.  What are the signs or symptoms?  Symptoms of this condition vary by the cause (viral, bacterial, or allergies) and can include:  · Sore throat.  · Fatigue.  · Low-grade fever.  · Headache.  · Joint pain and muscle aches.  · Skin rashes.  · Swollen glands in the throat (lymph nodes).  · Plaque-like film on the throat or tonsils. This is often a symptom of bacterial pharyngitis.  · Vomiting.  · Stuffy nose (nasal congestion).  · Cough.  · Red, itchy eyes (conjunctivitis).  · Loss of appetite.  How is this diagnosed?  This condition is often diagnosed based on your medical history and a physical exam. Your health care provider will ask you questions about your illness and your symptoms. A swab of your throat may be done to check for bacteria (rapid strep test). Other lab tests may also be done, depending on the suspected cause, but these are rare.  How is this treated?  This condition usually gets better in 3-4 days without medicine. Bacterial pharyngitis may be treated with antibiotic medicines.  Follow these instructions at home:  · Take over-the-counter and prescription medicines only as told by your health care provider.  ? If you were prescribed an antibiotic medicine, take it as told by your health care provider. Do not stop taking the antibiotic even if you start to feel better.  ? Do not give children aspirin because of the association with Reye syndrome.  · Drink enough water and fluids to keep your urine clear or  pale yellow.  · Get a lot of rest.  · Gargle with a salt-water mixture 3-4 times a day or as needed. To make a salt-water mixture, completely dissolve ½-1 tsp of salt in 1 cup of warm water.  · If your health care provider approves, you may use throat lozenges or sprays to soothe your throat.  Contact a health care provider if:  · You have large, tender lumps in your neck.  · You have a rash.  · You cough up green, yellow-brown, or bloody spit.  Get help right away if:  · Your neck becomes stiff.  · You drool or are unable to swallow liquids.  · You cannot drink or take medicines without vomiting.  · You have severe pain that does not go away, even after you take medicine.  · You have trouble breathing, and it is not caused by a stuffy nose.  · You have new pain and swelling in your joints such as the knees, ankles, wrists, or elbows.  Summary  · Pharyngitis is redness, pain, and swelling (inflammation) of the throat (pharynx).  · While pharyngitis can be caused by a bacteria, the most common causes are viral.  · Most cases of pharyngitis get better on their own without treatment.  · Bacterial pharyngitis is treated with antibiotic medicines.  This information is not intended to replace advice given to you by your health care provider. Make sure you discuss any questions you have with your health care provider.  Document Released: 12/18/2006 Document Revised: 01/23/2018 Document Reviewed: 01/23/2018  [employer] Interactive Patient Education © 2020 [employer]

## 2020-03-11 ENCOUNTER — TELEPHONE (OUTPATIENT)
Dept: FAMILY MEDICINE CLINIC | Facility: CLINIC | Age: 35
End: 2020-03-11

## 2020-03-11 NOTE — TELEPHONE ENCOUNTER
PATIENT CALLED IN REGARDS TO A FEVER BLISTER THAT HE HAS GOTTEN ON HIS LIP AND HE STATES THAT HE WOULD LIKE TO KNOW IF JENI COULD CALL IN A SCRIPT FOR VALTREX? PLEASE ADVISE AND CALL PT BACK [phone]

## 2020-04-08 ENCOUNTER — TELEPHONE (OUTPATIENT)
Dept: PHARMACY | Facility: HOSPITAL | Age: 35
End: 2020-04-08

## 2020-04-08 DIAGNOSIS — J30.2 SEASONAL ALLERGIES: ICD-10-CM

## 2020-04-09 RX ORDER — WARFARIN SODIUM 5 MG/1
TABLET ORAL
Qty: 110 TABLET | Refills: 0 | Status: SHIPPED | OUTPATIENT
Start: 2020-04-09 | End: 2020-07-13

## 2020-04-09 RX ORDER — LORATADINE 10 MG/1
10 TABLET ORAL DAILY
Qty: 30 TABLET | Refills: 3
Start: 2020-04-09 | End: 2020-04-12 | Stop reason: SDUPTHER

## 2020-04-12 DIAGNOSIS — J30.2 SEASONAL ALLERGIES: ICD-10-CM

## 2020-04-13 RX ORDER — LORATADINE 10 MG/1
10 TABLET ORAL DAILY
Qty: 30 TABLET | Refills: 3
Start: 2020-04-13 | End: 2021-01-12

## 2020-04-14 ENCOUNTER — TELEPHONE (OUTPATIENT)
Dept: FAMILY MEDICINE CLINIC | Facility: CLINIC | Age: 35
End: 2020-04-14

## 2020-04-14 ENCOUNTER — OFFICE VISIT (OUTPATIENT)
Dept: CARDIOLOGY | Facility: CLINIC | Age: 35
End: 2020-04-14

## 2020-04-14 VITALS — HEIGHT: 70 IN | BODY MASS INDEX: 31.57 KG/M2

## 2020-04-14 DIAGNOSIS — I87.1 SUPERIOR VENA CAVA SYNDROME: ICD-10-CM

## 2020-04-14 DIAGNOSIS — R06.09 DOE (DYSPNEA ON EXERTION): ICD-10-CM

## 2020-04-14 DIAGNOSIS — I42.8 OTHER CARDIOMYOPATHY (HCC): Primary | ICD-10-CM

## 2020-04-14 DIAGNOSIS — Z79.01 CURRENT USE OF LONG TERM ANTICOAGULATION: ICD-10-CM

## 2020-04-14 PROCEDURE — 99442 PR PHYS/QHP TELEPHONE EVALUATION 11-20 MIN: CPT | Performed by: NURSE PRACTITIONER

## 2020-04-14 NOTE — TELEPHONE ENCOUNTER
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 Please advise.     Patient call back: [phone].

## 2020-04-14 NOTE — TELEPHONE ENCOUNTER
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  Patient stated understanding

## 2020-04-14 NOTE — PROGRESS NOTES
Date of Office Visit: 2020  Encounter Provider: Jacqui Hagan, FRANCIS, APRN  Place of Service: [facility] CARDIOLOGY  Patient Name: Scott Allen  :1985        Subjective:     Chief Complaint:  Follow-up, questionable cardiomyopathy, history superior vena caval obstruction with revscularization      History of Present Illness:  Scott Allen is a 35 y.o. male patient of Dr. Serrato.    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     In  patient was treated for non-Hodgkin's lymphoma with chemotherapy and radiation therapy.  He has been followed by Dr. Mittal locally and is felt to be cured.  He developed bilateral upper extremity DVT and subclavian vein stenosis and percutaneous revascularization was attempted several times.  However this was unsuccessful and he developed subclavian vein syndrome.  He was seen by [facility] 2018 and had a surgical vascular shunt placed from the left internal jugular vein to the right atrium.  He was placed on Lovenox and aspirin with plans to transition to Coumadin.  However due to patient's work schedule he had not been able to return for this.  He developed pleural effusions and pericardial effusion postoperatively which improved with nonsteroidals.  Follow-up echo 2018 showed low normal systolic function with EF of 45 to 50%, though by Definity ejection fraction was normal with small pericardial effusion and no evidence of pericardial constraint.  Trivial mitral regurgitation was seen.  Follow-up CT scan 2018 showed graft with a mass in the superior mediastinum that was unchanged.  Right upper lobe nodule was felt to be stable. Repeat echo 2019 showed EF estimated at 49%.  Treadmill stress test was recommended and ordered but patient did not have this done.  He has missed last few  appointments. He reported a CT scan that showed partial obstruction of left IJ/brachicephalic venous stent.  He called [facility] and reports that they were not concerned by this and that they were not necessarily surprised by borderline low EF but did agree with stress test to ensure not ischemic related.       Patient has called into the office today for a telehealth phone follow-up appointment.  Video visit was attempted but patient was having issues with technology.  Patient reports he has been feeling ok since last visit.  As long as he exercises 1 hour every other day then he really does not have any breathing issues.  He feels he gets some shortness of breath with exertion/ loses some stamina if he goes over a week without exercise but then this improves after resuming exercise routine.  Denies chest pain or discomfort with exercise. BP has improved.  Has lost weight on purpose with exercise and improved diet.  Denies palpitations, racing heartbeat, edema, dizziness, syncope, near syncope, shortness of breath at rest, abnormal bleeding, falls, snoring, or fatigue.         Past Medical History:   Diagnosis Date   • Allergic    • Anemia    • Anxiety    • Atelectasis    • Chest pain    • Cough    • Current use of long term anticoagulation    • Deep vein thrombosis (CMS/HCC)     arms   • Diffuse large B cell lymphoma (CMS/HCC)     in remission s/p chemo and radiation   • GERD (gastroesophageal reflux disease)    • History of radiation therapy     to chest and neck   • Hyperhidrosis    • Hypothyroidism    • Insomnia    • Low testosterone in male    • Lymphoma (CMS/HCC)     lymphoma   • Pericardial effusion    • Pericarditis    • Superior vena cava syndrome     s/p vena cava reconstruction surgery   • TIA (transient ischemic attack)      Past Surgical History:   Procedure Laterality Date   • BRACHIOCEPHALIC VEIN ANGIOPLASTY / STENTING Bilateral     no stents were able to be placed   • BYPASS GRAFT  05/17/2018     Heart surgery bypass; Left IJ to right atrium   • CHEST TUBE INSERTION Right     after thoracentesis   • CORONARY ARTERY BYPASS GRAFT  2018   • EXCISION MASS HEAD/NECK Left 2/27/2017    Procedure: Excisional biopsy of left occipital lymph node;  Surgeon: Catalino Damon MD;  Location: [facility];  Service:    • LYMPH NODE BIOPSY     • OTHER SURGICAL HISTORY      SVC reconstruction/graft   • RIB BIOPSY Right 2011    MEDIASTINUM   • SKIN BIOPSY      cyst on left shoulder, benign   • THORACENTESIS Right     thora drain left in for 6 months     Outpatient Medications Prior to Visit   Medication Sig Dispense Refill   • aspirin 81 MG tablet Take 1 tablet by mouth Daily. 90 tablet 3   • loratadine (Claritin) 10 MG tablet Take 1 tablet by mouth Daily. 30 tablet 3   • Omega-3 Fatty Acids (FISH OIL) 1000 MG capsule capsule Take 2,000 mg by mouth Daily With Breakfast.     • Psyllium (METAMUCIL FIBER PO) Take  by mouth Daily.     • SYNTHROID 88 MCG tablet Take 1 tablet by mouth Daily. 30 tablet 5   • warfarin (COUMADIN) 5 MG tablet Take 1.5 tablets (7.5 mg) by mouth on Mon, Wed, and Fri, and take 1 tablet (5 mg) all other days, or as directed. 110 tablet 0   • mometasone (NASONEX) 50 MCG/ACT nasal spray 2 sprays into the nostril(s) as directed by provider Daily. 1 each 2   • trimethoprim-polymyxin b (POLYTRIM) 80160-8.1 UNIT/ML-% ophthalmic solution Administer 1 drop to both eyes Every 4 (Four) Hours. 1 each 0     No facility-administered medications prior to visit.        Allergies as of 04/14/2020 - Reviewed 04/14/2020   Allergen Reaction Noted   • Zoloft [sertraline hcl] Mental Status Change 09/29/2017   • Contrast dye Other (See Comments) 05/31/2018     Social History     Socioeconomic History   • Marital status: Single     Spouse name: Not on file   • Number of children: Not on file   • Years of education: College   • Highest education level: Not on file   Occupational History   • Occupation: Physical therapy  "tech     Employer: [employer]   Tobacco Use   • Smoking status: Never Smoker   • Smokeless tobacco: Never Used   • Tobacco comment: Daily caffeine use   Substance and Sexual Activity   • Alcohol use: Yes     Comment: 1-2/week   • Drug use: No   • Sexual activity: Defer     Partners: Male   Social History Narrative    Lives at home with his dog.  Works as a Physical Therapist.       Family History   Problem Relation Age of Onset   • Cancer Maternal Grandfather         Prostate and throat cancer   • Cancer Paternal Grandmother         Colon Cancer   • Diabetes Paternal Grandmother    • Asthma Paternal Grandmother    • Stroke Paternal Grandmother    • Hypertension Paternal Grandmother    • Aneurysm Paternal Grandmother    • Cancer Paternal Grandfather         Lung cancer   • COPD Paternal Grandfather    • Diabetes Father    • Sudden death Maternal Grandmother    • Cancer Maternal Grandmother         Breast Cancer       Review of Systems   Constitution: Positive for weight loss. Negative for malaise/fatigue.   Cardiovascular: Positive for dyspnea on exertion (mild occasional if does not exercise regularly, not new or worsening). Negative for chest pain, leg swelling, palpitations and syncope.   Respiratory: Negative for shortness of breath and snoring.    Hematologic/Lymphatic: Negative for bleeding problem.   Gastrointestinal: Negative for melena.   Genitourinary: Negative for hematuria.   Neurological: Negative for dizziness.   Psychiatric/Behavioral: Negative for altered mental status.          Objective:     Vitals:    04/14/20 1039   Height: 177.8 cm (70\")     Body mass index is 31.57 kg/m².        Assessment:       Diagnosis Plan   1. Other cardiomyopathy (CMS/HCC)  Adult Stress Echo W/ Cont or Stress Agent if Necessary Per Protocol   2. ARRIAGA (dyspnea on exertion)  Adult Stress Echo W/ Cont or Stress Agent if Necessary Per Protocol   3. Superior vena cava syndrome     4. Current use of long term " anticoagulation           Plan:     1. Borderline low EF: will pursue stress test.  Will schedule for June since currently asymptomatic and given current coronavirus epidemic.  May have to push out further if virus levels still an issue at that time and patient remains asymptomatic.   2. Subclavian vein obstruction s/p left IJ to right atrial revascularization: follows with [facility].  Has touched base with them recently. AC with warfarin-- no falls or bleeding issues.  3. History of pericardial effusion: resolved with NSAIDs.   4. Hx suspected sleep apnea:  Has since lost weight on purpose (healthy diet and exercise), no snoring since losing weight, no fatigue since he has been exercising. Will continue to monitor for now.  Previous sleep apnea s/s have resolved.   5. History bilateral upper extremity DVT  6. Hypothyroidism: PCP managing  7. Hx non-Hodgkin's lymphoma with chest wall radiation     Patient to follow-up with Dr. Serrato in 6 months or sooner if needed for any new, recurrent, or worsening symptoms or other problems/ concerns.  Discussed importance of keeping office follow-up appointments.     This patient has consented to a telehealth visit via phone. The visit was scheduled as a telehealth phone visit to comply with patient safety concerns in accordance with CDC recommendations.  All vitals recorded within this visit are reported by the patient.  I spent 11 minutes in direct conversation with this patient.            Your medication list           Accurate as of April 14, 2020 12:41 PM. If you have any questions, ask your nurse or doctor.               CONTINUE taking these medications      Instructions Last Dose Given Next Dose Due   aspirin 81 MG tablet      Take 1 tablet by mouth Daily.       fish oil 1000 MG capsule capsule      Take 2,000 mg by mouth Daily With Breakfast.       loratadine 10 MG tablet  Commonly known as:  Claritin      Take 1 tablet by mouth Daily.       METAMUCIL FIBER PO       Take  by mouth Daily.       Synthroid 88 MCG tablet  Generic drug:  levothyroxine      Take 1 tablet by mouth Daily.       warfarin 5 MG tablet  Commonly known as:  COUMADIN      Take 1.5 tablets (7.5 mg) by mouth on Mon, Wed, and Fri, and take 1 tablet (5 mg) all other days, or as directed.          STOP taking these medications    trimethoprim-polymyxin b 97341-7.1 UNIT/ML-% ophthalmic solution  Commonly known as:  Polytrim  Stopped by:  Jacqui Hagan DNP, APRN               I did not stop or change the above medications.  Patient's medication list was updated to reflect medications they are currently taking including medication changes made by other providers.          Thanks,    Jacqui Hagan DNP, APRN  04/14/2020           Dictated utilizing Dragon dictation

## 2020-05-06 ENCOUNTER — ANTICOAGULATION VISIT (OUTPATIENT)
Dept: PHARMACY | Facility: HOSPITAL | Age: 35
End: 2020-05-06

## 2020-05-06 DIAGNOSIS — Z79.01 ANTICOAGULATED ON COUMADIN: ICD-10-CM

## 2020-05-06 LAB
INR PPP: 3.8 (ref 0.91–1.09)
PROTHROMBIN TIME: 45.9 SECONDS (ref 10–13.8)

## 2020-05-06 PROCEDURE — 36416 COLLJ CAPILLARY BLOOD SPEC: CPT

## 2020-05-06 PROCEDURE — G0463 HOSPITAL OUTPT CLINIC VISIT: HCPCS

## 2020-05-06 PROCEDURE — 85610 PROTHROMBIN TIME: CPT

## 2020-05-06 NOTE — PROGRESS NOTES
Anticoagulation Clinic Progress Note    Anticoagulation Summary  As of 5/6/2020    INR goal:   2.0-3.0   TTR:   49.2 % (1.5 y)   INR used for dosing:   3.8! (5/6/2020)   Warfarin maintenance plan:   7.5 mg every Mon, Wed, Fri; 5 mg all other days   Weekly warfarin total:   42.5 mg   Plan last modified:   Jody Machado, Pharmacy Intern (9/30/2019)   Next INR check:   5/20/2020   Priority:   Maintenance   Target end date:   Indefinite    Indications    Anticoagulated on Coumadin [Z79.01]  DVT (deep venous thrombosis) (CMS/[facility]) [I82.409] [I82.409]             Anticoagulation Episode Summary     INR check location:       Preferred lab:       Send INR reminders to:   [facility]    Comments:         Anticoagulation Care Providers     Provider Role Specialty Phone number    Lynda Serrato MD Referring Cardiology [phone]          Clinic Interview:  Patient Findings     Positives:   Other complaints    Negatives:   Signs/symptoms of thrombosis, Signs/symptoms of bleeding,   Laboratory test error suspected, Change in health, Change in alcohol use,   Change in activity, Upcoming invasive procedure, Emergency department   visit, Upcoming dental procedure, Missed doses, Extra doses, Change in   medications, Change in diet/appetite, Hospital admission, Bruising    Comments:    Increased stress r/t COVID-19 pandemic. No other changes   reported.       Clinical Outcomes     Negatives:   Major bleeding event, Thromboembolic event,   Anticoagulation-related hospital admission, Anticoagulation-related ED   visit, Anticoagulation-related fatality    Comments:    Increased stress r/t COVID-19 pandemic. No other changes   reported.         INR History:  Anticoagulation Monitoring 12/13/2019 2/28/2020 5/6/2020   INR 2.5 2.2 3.8   INR Date 12/13/2019 2/28/2020 5/6/2020   INR Goal 2.0-3.0 2.0-3.0 2.0-3.0   Trend Same Same Same   Last Week Total 42.5 mg 42.5 mg 42.5 mg   Next Week Total 42.5 mg 42.5 mg 35 mg   Sun 5  mg 5 mg 5 mg   Mon 7.5 mg 7.5 mg 7.5 mg   Tue 5 mg 5 mg 5 mg   Wed 7.5 mg 7.5 mg Hold (5/6); Otherwise 7.5 mg   Thu 5 mg 5 mg 5 mg   Fri 7.5 mg 7.5 mg 7.5 mg   Sat 5 mg 5 mg 5 mg   Visit Report - - -   Some recent data might be hidden       Plan:  1. INR is Supratherapeutic today- see above in Anticoagulation Summary.  Will instruct Scott Allen to Change their warfarin regimen- see above in Anticoagulation Summary.  2. Follow up in 2 weeks  3. Patient declines warfarin refills.  4. Verbal and written information provided. Patient expresses understanding and has no further questions at this time.    Ruben [facility]

## 2020-05-07 ENCOUNTER — APPOINTMENT (OUTPATIENT)
Dept: PHARMACY | Facility: HOSPITAL | Age: 35
End: 2020-05-07

## 2020-05-20 ENCOUNTER — ANTICOAGULATION VISIT (OUTPATIENT)
Dept: PHARMACY | Facility: HOSPITAL | Age: 35
End: 2020-05-20

## 2020-05-20 DIAGNOSIS — Z79.01 ANTICOAGULATED ON COUMADIN: ICD-10-CM

## 2020-05-20 LAB
INR PPP: 3.1 (ref 0.91–1.09)
PROTHROMBIN TIME: 37.6 SECONDS (ref 10–13.8)

## 2020-05-20 PROCEDURE — 85610 PROTHROMBIN TIME: CPT

## 2020-05-20 PROCEDURE — 36416 COLLJ CAPILLARY BLOOD SPEC: CPT

## 2020-05-20 NOTE — PROGRESS NOTES
Anticoagulation Clinic Progress Note    Anticoagulation Summary  As of 5/20/2020    INR goal:   2.0-3.0   TTR:   48.0 % (1.6 y)   INR used for dosing:   3.1! (5/20/2020)   Warfarin maintenance plan:   7.5 mg every Mon, Wed, Fri; 5 mg all other days   Weekly warfarin total:   42.5 mg   No change documented:   Yamilka Salvador   Plan last modified:   Jody Machado, Pharmacy Intern (9/30/2019)   Next INR check:   6/17/2020   Priority:   Maintenance   Target end date:   Indefinite    Indications    Anticoagulated on Coumadin [Z79.01]  DVT (deep venous thrombosis) (CMS/[facility]) [I82.409] [I82.409]             Anticoagulation Episode Summary     INR check location:       Preferred lab:       Send INR reminders to:   [facility]    Comments:         Anticoagulation Care Providers     Provider Role Specialty Phone number    Lynda Serrato MD Referring Cardiology [phone]          Clinic Interview:  Patient Findings     Negatives:   Signs/symptoms of thrombosis, Signs/symptoms of bleeding,   Laboratory test error suspected, Change in health, Change in alcohol use,   Change in activity, Upcoming invasive procedure, Emergency department   visit, Upcoming dental procedure, Missed doses, Extra doses, Change in   medications, Change in diet/appetite, Hospital admission, Bruising, Other   complaints      Clinical Outcomes     Negatives:   Major bleeding event, Thromboembolic event,   Anticoagulation-related hospital admission, Anticoagulation-related ED   visit, Anticoagulation-related fatality        INR History:  Anticoagulation Monitoring 2/28/2020 5/6/2020 5/20/2020   INR 2.2 3.8 3.1   INR Date 2/28/2020 5/6/2020 5/20/2020   INR Goal 2.0-3.0 2.0-3.0 2.0-3.0   Trend Same Same Same   Last Week Total 42.5 mg 42.5 mg 42.5 mg   Next Week Total 42.5 mg 35 mg 42.5 mg   Sun 5 mg 5 mg 5 mg   Mon 7.5 mg 7.5 mg 7.5 mg   Tue 5 mg 5 mg 5 mg   Wed 7.5 mg Hold (5/6); Otherwise 7.5 mg 7.5 mg   Thu 5 mg 5 mg 5 mg    Fri 7.5 mg 7.5 mg 7.5 mg   Sat 5 mg 5 mg 5 mg   Visit Report - - -   Some recent data might be hidden       Plan:  1. INR is out of range- see above in Anticoagulation Summary.   Will instruct Scott Allen to Continue  their warfarin regimen- see above in Anticoagulation Summary.  2. Follow up in 4 weeks.   3. Patient declines warfarin refills.  4. Verbal and written information provided. Patient expresses understanding and has no further questions at this time.    Yamilka Salvador

## 2020-05-29 ENCOUNTER — TELEPHONE (OUTPATIENT)
Dept: FAMILY MEDICINE CLINIC | Facility: CLINIC | Age: 35
End: 2020-05-29

## 2020-05-29 ENCOUNTER — OFFICE VISIT (OUTPATIENT)
Dept: FAMILY MEDICINE CLINIC | Facility: CLINIC | Age: 35
End: 2020-05-29

## 2020-05-29 VITALS
TEMPERATURE: 98.6 F | BODY MASS INDEX: 31.07 KG/M2 | HEART RATE: 98 BPM | RESPIRATION RATE: 16 BRPM | DIASTOLIC BLOOD PRESSURE: 76 MMHG | SYSTOLIC BLOOD PRESSURE: 108 MMHG | HEIGHT: 70 IN | WEIGHT: 217 LBS | OXYGEN SATURATION: 99 %

## 2020-05-29 DIAGNOSIS — H00.015 HORDEOLUM EXTERNUM OF LEFT LOWER EYELID: ICD-10-CM

## 2020-05-29 DIAGNOSIS — H00.015 HORDEOLUM EXTERNUM OF LEFT LOWER EYELID: Primary | ICD-10-CM

## 2020-05-29 PROCEDURE — 99213 OFFICE O/P EST LOW 20 MIN: CPT | Performed by: NURSE PRACTITIONER

## 2020-05-29 RX ORDER — FLUTICASONE PROPIONATE 50 MCG
1 SPRAY, SUSPENSION (ML) NASAL ONCE
COMMUNITY
Start: 2020-04-03 | End: 2021-01-12

## 2020-05-29 RX ORDER — BACITRACIN 500 [USP'U]/G
OINTMENT OPHTHALMIC EVERY 4 HOURS
Qty: 3.5 G | Refills: 0 | Status: SHIPPED | OUTPATIENT
Start: 2020-05-29 | End: 2020-09-11

## 2020-05-29 RX ORDER — BACITRACIN 500 [USP'U]/G
OINTMENT OPHTHALMIC EVERY 4 HOURS
Qty: 3.5 G | Refills: 0 | Status: SHIPPED | OUTPATIENT
Start: 2020-05-29 | End: 2020-05-29 | Stop reason: SDUPTHER

## 2020-05-29 NOTE — TELEPHONE ENCOUNTER
PT STATES THAT HE WAS IN THE OFFICE TODAY AND WAS PRESCRIBED BICITRACIN 500 UNITS WHICH WAS TO GO TO HIS PREFERRED PHARMACY [address] BUT THEY ARE CURRENTLY OUT OF THE MEDS.  PT FOUND ANOTHER PHARMACY THAT HAS IT AND WANTS TO KNOW IF THE PRESCRIPTION CAN BE SENT THERE INSTEAD.  PT STATES THAT HE NEEDS TO GET THIS IN HIS SYSTEM TODAY AND THAT THE PHARMACY ONLY HAS ONE LEFT.     [facility]  [facility] [address] [place] [id number] [phone]    PT CALL BACK  [phone]

## 2020-05-29 NOTE — PROGRESS NOTES
"Brigitte Allen is a 35 y.o. male.     History of Present Illness   Pt reports for c/o of stye in his left eye. He states that this started on Saturday. He reports that he \"popped\" one of the styes and that it now is very painful. He has been using Polytrim in his eye from a previous prescription for \"about a week\".     The following portions of the patient's history were reviewed and updated as appropriate: allergies, current medications, past family history, past medical history, past social history, past surgical history and problem list.    Review of Systems   Constitutional: Negative for appetite change, chills, fatigue and fever.   HENT: Negative for congestion, ear pain, postnasal drip, rhinorrhea, sinus pressure, sneezing and sore throat.    Eyes: Positive for pain, discharge, redness and itching. Negative for visual disturbance.        Stye to left eye   Respiratory: Negative for cough, chest tightness and shortness of breath.    Cardiovascular: Negative for chest pain, palpitations and leg swelling.   Musculoskeletal: Negative for myalgias.   Allergic/Immunologic: Negative.    Neurological: Negative for dizziness and headache.       Objective   Physical Exam   Constitutional: He is oriented to person, place, and time. He appears well-developed and well-nourished.   HENT:   Head: Normocephalic and atraumatic.   Right Ear: External ear normal.   Left Ear: External ear normal.   Nose: Nose normal.   Mouth/Throat: Oropharynx is clear and moist. No oropharyngeal exudate.   Eyes: Pupils are equal, round, and reactive to light. Conjunctivae and EOM are normal. Right eye exhibits no discharge, no exudate and no hordeolum. No foreign body present in the right eye. Left eye exhibits discharge and hordeolum (lower eyelid). Right conjunctiva is not injected. Right conjunctiva has no hemorrhage. Left conjunctiva is not injected. Left conjunctiva has no hemorrhage.   Neck: Normal range of motion. Neck supple. " No thyromegaly present.   Cardiovascular: Normal rate, regular rhythm, normal heart sounds and intact distal pulses.   No murmur heard.  Pulmonary/Chest: Effort normal and breath sounds normal. No tachypnea. No respiratory distress. He has no decreased breath sounds. He has no wheezes. He has no rales.   Lymphadenopathy:     He has no cervical adenopathy.   Neurological: He is alert and oriented to person, place, and time.   Skin: Skin is warm and dry.   Psychiatric: He has a normal mood and affect. His behavior is normal. Judgment and thought content normal.   Nursing note and vitals reviewed.      Vitals:    05/29/20 0809   BP: 108/76   Pulse: 98   Resp: 16   Temp: 98.6 °F (37 °C)   SpO2: 99%     Body mass index is 31.14 kg/m².    Procedures    Assessment/Plan   Problems Addressed this Visit     None      Visit Diagnoses     Hordeolum externum of left lower eyelid    -  Primary    Relevant Medications    bacitracin 500 UNIT/GM ophthalmic ointment        Return if symptoms worsen or fail to improve.         Answers for HPI/ROS submitted by the patient on 5/28/2020   What is the primary reason for your visit?: Other  Please describe your symptoms.: Sties in left eye and swelling.  Have you had these symptoms before?: No  How long have you been having these symptoms?: 1-4 days

## 2020-05-29 NOTE — PATIENT INSTRUCTIONS
Return if symptoms worsen or fail to improve.  Call with any questions or concerns.     Stye    A stye, also known as a hordeolum, is a bump that forms on an eyelid. It may look like a pimple next to the eyelash. A stye can form inside the eyelid (internal stye) or outside the eyelid (external stye). A stye can cause redness, swelling, and pain on the eyelid.  Styes are very common. Anyone can get them at any age. They usually occur in just one eye, but you may have more than one in either eye.  What are the causes?  A stye is caused by an infection. The infection is almost always caused by bacteria called Staphylococcus aureus. This is a common type of bacteria that lives on the skin.  An internal stye may result from an infected oil-producing gland inside the eyelid. An external stye may be caused by an infection at the base of the eyelash (hair follicle).  What increases the risk?  You are more likely to develop a stye if:  · You have had a stye before.  · You have any of these conditions:  ? Diabetes.  ? Red, itchy, inflamed eyelids (blepharitis).  ? A skin condition such as seborrheic dermatitis or rosacea.  ? High fat levels in your blood (lipids).  What are the signs or symptoms?  The most common symptom of a stye is eyelid pain. Internal styes are more painful than external styes. Other symptoms may include:  · Painful swelling of your eyelid.  · A scratchy feeling in your eye.  · Tearing and redness of your eye.  · Pus draining from the stye.  How is this diagnosed?  Your health care provider may be able to diagnose a stye just by examining your eye. The health care provider may also check to make sure:  · You do not have a fever or other signs of a more serious infection.  · The infection has not spread to other parts of your eye or areas around your eye.  How is this treated?  Most styes will clear up in a few days without treatment or with warm compresses applied to the area. You may need to use  antibiotic drops or ointment to treat an infection.  In some cases, if your stye does not heal with routine treatment, your health care provider may drain pus from the stye using a thin blade or needle. This may be done if the stye is large, causing a lot of pain, or affecting your vision.  Follow these instructions at home:  · Take over-the-counter and prescription medicines only as told by your health care provider. This includes eye drops or ointments.  · If you were prescribed an antibiotic medicine, apply or use it as told by your health care provider. Do not stop using the antibiotic even if your condition improves.  · Apply a warm, wet cloth (warm compress) to your eye for 5-10 minutes, 4 times a day.  · Clean the affected eyelid as directed by your health care provider.  · Do not wear contact lenses or eye makeup until your stye has healed.  · Do not try to pop or drain the stye.  · Do not rub your eye.  Contact a health care provider if:  · You have chills or a fever.  · Your stye does not go away after several days.  · Your stye affects your vision.  · Your eyeball becomes swollen, red, or painful.  Get help right away if:  · You have pain when moving your eye around.  Summary  · A stye is a bump that forms on an eyelid. It may look like a pimple next to the eyelash.  · A stye can form inside the eyelid (internal stye) or outside the eyelid (external stye). A stye can cause redness, swelling, and pain on the eyelid.  · Your health care provider may be able to diagnose a stye just by examining your eye.  · Apply a warm, wet cloth (warm compress) to your eye for 5-10 minutes, 4 times a day.  This information is not intended to replace advice given to you by your health care provider. Make sure you discuss any questions you have with your health care provider.  Document Released: 09/27/2006 Document Revised: 11/30/2018 Document Reviewed: 08/30/2018  Elsevier Patient Education © 2020 [employer]

## 2020-07-02 ENCOUNTER — TELEPHONE (OUTPATIENT)
Dept: FAMILY MEDICINE CLINIC | Facility: CLINIC | Age: 35
End: 2020-07-02

## 2020-07-02 NOTE — TELEPHONE ENCOUNTER
PT STATES THAT HE WAS TREATED FOR A STYE ON HIS LEFT EYE AND THE MEDICATION HELPED A LOT.  PT IS CONCERNED ABOUT A SPOT ON HIS EYELID THAT DID NOT HEAL AND WANTS TO KNOW IF JENI DICKERSON WANTS TO SEE HIM AGAIN OR IF  HE NEEDS TO BE REFERRED TO SOMEONE ELSE.  PT IS REQUESTING A CALL BACK.      PT CALL BACK  [phone]  PHARMACY: [facility] [address] [place] [address] [phone]

## 2020-07-06 ENCOUNTER — OFFICE VISIT (OUTPATIENT)
Dept: FAMILY MEDICINE CLINIC | Facility: CLINIC | Age: 35
End: 2020-07-06

## 2020-07-06 VITALS
HEIGHT: 70 IN | HEART RATE: 97 BPM | TEMPERATURE: 98.7 F | SYSTOLIC BLOOD PRESSURE: 124 MMHG | OXYGEN SATURATION: 97 % | WEIGHT: 215 LBS | RESPIRATION RATE: 16 BRPM | DIASTOLIC BLOOD PRESSURE: 80 MMHG | BODY MASS INDEX: 30.78 KG/M2

## 2020-07-06 DIAGNOSIS — H00.015 HORDEOLUM EXTERNUM OF LEFT LOWER EYELID: Primary | ICD-10-CM

## 2020-07-06 PROCEDURE — 99213 OFFICE O/P EST LOW 20 MIN: CPT | Performed by: NURSE PRACTITIONER

## 2020-07-06 NOTE — PROGRESS NOTES
Subjective stye to left eye  Scott Allen is a 35 y.o. male.   Facial Swelling    History of Present Illness   Started about a month ago and he saw Chel and she gave him some ointment which helped some but he still has a stye still.    The following portions of the patient's history were reviewed and updated as appropriate: allergies, current medications, past family history, past medical history, past social history, past surgical history and problem list.    Review of Systems   Constitutional: Negative for activity change, appetite change and fatigue.   Eyes: Positive for pain. Negative for photophobia.   Respiratory: Negative for cough.        Objective   Physical Exam   Constitutional: He is oriented to person, place, and time. He appears well-developed and well-nourished.   HENT:   Head: Normocephalic and atraumatic.   Eyes: Pupils are equal, round, and reactive to light. EOM are normal.   Stye to lower left lid   Pulmonary/Chest: Effort normal.   Musculoskeletal: Normal range of motion.   Neurological: He is alert and oriented to person, place, and time.   Skin: Skin is warm and dry.   Psychiatric: He has a normal mood and affect.   Nursing note and vitals reviewed.        Assessment/Plan   Problem List Items Addressed This Visit     None      Visit Diagnoses     Hordeolum externum of left lower eyelid    -  Primary        Will follow up as needed       No follow-ups on file.

## 2020-07-07 ENCOUNTER — TELEPHONE (OUTPATIENT)
Dept: PHARMACY | Facility: HOSPITAL | Age: 35
End: 2020-07-07

## 2020-07-13 RX ORDER — WARFARIN SODIUM 5 MG/1
TABLET ORAL
Qty: 110 TABLET | Refills: 0 | Status: SHIPPED | OUTPATIENT
Start: 2020-07-13 | End: 2020-10-23 | Stop reason: SDUPTHER

## 2020-07-17 ENCOUNTER — TELEPHONE (OUTPATIENT)
Dept: PHARMACY | Facility: HOSPITAL | Age: 35
End: 2020-07-17

## 2020-07-24 ENCOUNTER — ANTICOAGULATION VISIT (OUTPATIENT)
Dept: PHARMACY | Facility: HOSPITAL | Age: 35
End: 2020-07-24

## 2020-07-24 DIAGNOSIS — Z79.01 ANTICOAGULATED ON COUMADIN: ICD-10-CM

## 2020-07-24 LAB
INR PPP: 2.7 (ref 0.91–1.09)
PROTHROMBIN TIME: 31.8 SECONDS (ref 10–13.8)

## 2020-07-24 PROCEDURE — 36416 COLLJ CAPILLARY BLOOD SPEC: CPT

## 2020-07-24 PROCEDURE — 85610 PROTHROMBIN TIME: CPT

## 2020-07-24 NOTE — PROGRESS NOTES
Anticoagulation Clinic Progress Note    Anticoagulation Summary  As of 2020    INR goal:   2.0-3.0   TTR:   50.8 % (1.7 y)   INR used for dosin.7 (2020)   Warfarin maintenance plan:   7.5 mg every Mon, Wed, Fri; 5 mg all other days   Weekly warfarin total:   42.5 mg   Plan last modified:   Jody Machado, Pharmacy Intern (2019)   Next INR check:   2020   Priority:   Maintenance   Target end date:   Indefinite    Indications    Anticoagulated on Coumadin [Z79.01]  DVT (deep venous thrombosis) (CMS/[facility]) [I82.409] [I82.409]             Anticoagulation Episode Summary     INR check location:       Preferred lab:       Send INR reminders to:   [facility]    Comments:         Anticoagulation Care Providers     Provider Role Specialty Phone number    Lynda Serrato MD Referring Cardiology [phone]          Clinic Interview:  Patient Findings     Negatives:   Signs/symptoms of thrombosis, Signs/symptoms of bleeding,   Laboratory test error suspected, Change in health, Change in alcohol use,   Change in activity, Upcoming invasive procedure, Emergency department   visit, Upcoming dental procedure, Missed doses, Extra doses, Change in   medications, Change in diet/appetite, Hospital admission, Bruising, Other   complaints      Clinical Outcomes     Negatives:   Major bleeding event, Thromboembolic event,   Anticoagulation-related hospital admission, Anticoagulation-related ED   visit, Anticoagulation-related fatality        INR History:  Anticoagulation Monitoring 2020   INR 3.8 3.1 2.7   INR Date 2020   INR Goal 2.0-3.0 2.0-3.0 2.0-3.0   Trend Same Same Same   Last Week Total 42.5 mg 42.5 mg 42.5 mg   Next Week Total 35 mg 42.5 mg 42.5 mg   Sun 5 mg 5 mg 5 mg   Mon 7.5 mg 7.5 mg 7.5 mg   Tue 5 mg 5 mg 5 mg   Wed Hold (); Otherwise 7.5 mg 7.5 mg 7.5 mg   Thu 5 mg 5 mg 5 mg   Fri 7.5 mg 7.5 mg 7.5 mg   Sat 5 mg 5 mg 5 mg    Visit Report - - -   Some recent data might be hidden       Plan:  1. INR is Therapeutic today- see above in Anticoagulation Summary.  Will instruct Scott Allen to Continue their warfarin regimen- see above in Anticoagulation Summary.  2. Follow up in 4 weeks  3. Patient declines warfarin refills.  4. Verbal and written information provided. Patient expresses understanding and has no further questions at this time.    Kevin Rodriguez, Pharmacy Intern

## 2020-07-24 NOTE — PROGRESS NOTES
I have supervised and reviewed the notes, assessments, and/or procedures performed by our PharmD Candidate. The documented assessment and plan were developed cooperatively. I concur with the documentation of this patient encounter.    Ruben Washington RP

## 2020-09-11 ENCOUNTER — OFFICE VISIT (OUTPATIENT)
Dept: FAMILY MEDICINE CLINIC | Facility: CLINIC | Age: 35
End: 2020-09-11

## 2020-09-11 VITALS
HEIGHT: 70 IN | BODY MASS INDEX: 30.35 KG/M2 | DIASTOLIC BLOOD PRESSURE: 72 MMHG | TEMPERATURE: 97.8 F | WEIGHT: 212 LBS | SYSTOLIC BLOOD PRESSURE: 120 MMHG | RESPIRATION RATE: 16 BRPM | OXYGEN SATURATION: 98 % | HEART RATE: 84 BPM

## 2020-09-11 DIAGNOSIS — R11.0 NAUSEA: Primary | ICD-10-CM

## 2020-09-11 PROCEDURE — 99213 OFFICE O/P EST LOW 20 MIN: CPT | Performed by: NURSE PRACTITIONER

## 2020-09-11 RX ORDER — ONDANSETRON HYDROCHLORIDE 8 MG/1
8 TABLET, FILM COATED ORAL EVERY 8 HOURS PRN
Qty: 30 TABLET | Refills: 0 | Status: SHIPPED | OUTPATIENT
Start: 2020-09-11 | End: 2020-09-21

## 2020-09-11 NOTE — PATIENT INSTRUCTIONS
I will call you with your lab results.   Please call with any questions or concerns.   If symptoms worsen or become severe, go to an ER.     Nausea, Adult  Nausea is the feeling that you have an upset stomach or that you are about to vomit. Nausea on its own is not usually a serious concern, but it may be an early sign of a more serious medical problem. As nausea gets worse, it can lead to vomiting. If vomiting develops, or if you are not able to drink enough fluids, you are at risk of becoming dehydrated. Dehydration can make you tired and thirsty, cause you to have a dry mouth, and decrease how often you urinate. Older adults and people with other diseases or a weak disease-fighting system (immune system) are at higher risk for dehydration. The main goals of treating your nausea are:  · To relieve your nausea.  · To limit repeated nausea episodes.  · To prevent vomiting and dehydration.  Follow these instructions at home:  Watch your symptoms for any changes. Tell your health care provider about them. Follow these instructions as told by your health care provider.  Eating and drinking         · Take an oral rehydration solution (ORS). This is a drink that is sold at pharmacies and retail stores.  · Drink clear fluids slowly and in small amounts as you are able. Clear fluids include water, ice chips, low-calorie sports drinks, and fruit juice that has water added (diluted fruit juice).  · Eat bland, easy-to-digest foods in small amounts as you are able. These foods include bananas, applesauce, rice, lean meats, toast, and crackers.  · Avoid drinking fluids that contain a lot of sugar or caffeine, such as energy drinks, sports drinks, and soda.  · Avoid alcohol.  · Avoid spicy or fatty foods.  General instructions  · Take over-the-counter and prescription medicines only as told by your health care provider.  · Rest at home while you recover.  · Drink enough fluid to keep your urine pale yellow.  · Breathe slowly and  deeply when you feel nauseous.  · Avoid smelling things that have strong odors.  · Wash your hands often using soap and water. If soap and water are not available, use hand .  · Make sure that all people in your household wash their hands well and often.  · Keep all follow-up visits as told by your health care provider. This is important.  Contact a health care provider if:  · Your nausea gets worse.  · Your nausea does not go away after two days.  · You vomit.  · You cannot drink fluids without vomiting.  · You have any of the following:  ? New symptoms.  ? A fever.  ? A headache.  ? Muscle cramps.  ? A rash.  ? Pain while urinating.  · You feel light-headed or dizzy.  Get help right away if:  · You have pain in your chest, neck, arm, or jaw.  · You feel extremely weak or you faint.  · You have vomit that is bright red or looks like coffee grounds.  · You have bloody or black stools or stools that look like tar.  · You have a severe headache, a stiff neck, or both.  · You have severe pain, cramping, or bloating in your abdomen.  · You have difficulty breathing or are breathing very quickly.  · Your heart is beating very quickly.  · Your skin feels cold and clammy.  · You feel confused.  · You have signs of dehydration, such as:  ? Dark urine, very little urine, or no urine.  ? Cracked lips.  ? Dry mouth.  ? Sunken eyes.  ? Sleepiness.  ? Weakness.  These symptoms may represent a serious problem that is an emergency. Do not wait to see if the symptoms will go away. Get medical help right away. Call your local emergency services (911 in the [place] Do not drive yourself to the hospital.  Summary  · Nausea is the feeling that you have an upset stomach or that you are about to vomit. Nausea on its own is not usually a serious concern, but it may be an early sign of a more serious medical problem.  · If vomiting develops, or if you are not able to drink enough fluids, you are at risk of becoming  dehydrated.  · Follow recommendations for eating and drinking and take over-the-counter and prescription medicines only as told by your health care provider.  · Contact a health care provider right away if your symptoms worsen or you have new symptoms.  · Keep all follow-up visits as told by your health care provider. This is important.  This information is not intended to replace advice given to you by your health care provider. Make sure you discuss any questions you have with your health care provider.  Document Released: 01/25/2006 Document Revised: 05/28/2019 Document Reviewed: 05/28/2019  Elsevier Patient Education © 2020 [employer]

## 2020-09-11 NOTE — PROGRESS NOTES
"Brigitte Allen is a 35 y.o. male     History of Present Illness   Pt presents with c/o of nausea that has been ongoing for 1 week.  He reports that nausea is worse after eating and \"as the day goes on\".  He denies any abdominal pain, vomiting or changes to diet. He also denies any constipation or diarrhea.  Pt is not taking anything for his symptoms.     The following portions of the patient's history were reviewed and updated as appropriate: allergies, current medications, past family history, past medical history, past social history, past surgical history and problem list.    Review of Systems   Constitutional: Negative for appetite change, chills, fatigue and fever.   Respiratory: Negative for cough, chest tightness, shortness of breath and wheezing.    Cardiovascular: Negative for chest pain, palpitations and leg swelling.   Gastrointestinal: Positive for nausea. Negative for abdominal distention, abdominal pain, anal bleeding, blood in stool, constipation, diarrhea, rectal pain, vomiting, GERD and indigestion.   Genitourinary: Negative.  Negative for flank pain.   Neurological: Negative for dizziness and headache.       Objective   Physical Exam   Constitutional: He is oriented to person, place, and time. He appears well-developed and well-nourished.   HENT:   Head: Normocephalic and atraumatic.   Eyes: Pupils are equal, round, and reactive to light. Conjunctivae and EOM are normal.   Neck: Normal range of motion. Neck supple. No thyromegaly present.   Cardiovascular: Normal rate, regular rhythm, normal heart sounds and intact distal pulses.   No murmur heard.  Pulmonary/Chest: Effort normal and breath sounds normal.   Abdominal: Soft. Bowel sounds are normal. He exhibits no distension and no mass. There is no hepatosplenomegaly. There is no tenderness. There is no rebound, no guarding, no tenderness at McBurney's point and negative Carlisle's sign. No hernia.   Lymphadenopathy:     He has no " cervical adenopathy.   Neurological: He is alert and oriented to person, place, and time.   Skin: Skin is warm and dry.   Psychiatric: He has a normal mood and affect. His behavior is normal. Judgment and thought content normal.   Nursing note and vitals reviewed.      Vitals:    09/11/20 1358   BP: 120/72   Pulse: 84   Resp: 16   Temp: 97.8 °F (36.6 °C)   SpO2: 98%     Body mass index is 30.42 kg/m².    Procedures    Assessment/Plan   Problems Addressed this Visit     None      Visit Diagnoses     Nausea    -  Primary    Relevant Medications    ondansetron (Zofran) 8 MG tablet    Other Relevant Orders    Comprehensive Metabolic Panel    Amylase    Lipase    CBC & Differential        Return if symptoms worsen or fail to improve.         Patient Instructions   I will call you with your lab results.   Please call with any questions or concerns.   If symptoms worsen or become severe, go to an ER.     Nausea, Adult  Nausea is the feeling that you have an upset stomach or that you are about to vomit. Nausea on its own is not usually a serious concern, but it may be an early sign of a more serious medical problem. As nausea gets worse, it can lead to vomiting. If vomiting develops, or if you are not able to drink enough fluids, you are at risk of becoming dehydrated. Dehydration can make you tired and thirsty, cause you to have a dry mouth, and decrease how often you urinate. Older adults and people with other diseases or a weak disease-fighting system (immune system) are at higher risk for dehydration. The main goals of treating your nausea are:  · To relieve your nausea.  · To limit repeated nausea episodes.  · To prevent vomiting and dehydration.  Follow these instructions at home:  Watch your symptoms for any changes. Tell your health care provider about them. Follow these instructions as told by your health care provider.  Eating and drinking         · Take an oral rehydration solution (ORS). This is a drink that is  sold at pharmacies and retail stores.  · Drink clear fluids slowly and in small amounts as you are able. Clear fluids include water, ice chips, low-calorie sports drinks, and fruit juice that has water added (diluted fruit juice).  · Eat bland, easy-to-digest foods in small amounts as you are able. These foods include bananas, applesauce, rice, lean meats, toast, and crackers.  · Avoid drinking fluids that contain a lot of sugar or caffeine, such as energy drinks, sports drinks, and soda.  · Avoid alcohol.  · Avoid spicy or fatty foods.  General instructions  · Take over-the-counter and prescription medicines only as told by your health care provider.  · Rest at home while you recover.  · Drink enough fluid to keep your urine pale yellow.  · Breathe slowly and deeply when you feel nauseous.  · Avoid smelling things that have strong odors.  · Wash your hands often using soap and water. If soap and water are not available, use hand .  · Make sure that all people in your household wash their hands well and often.  · Keep all follow-up visits as told by your health care provider. This is important.  Contact a health care provider if:  · Your nausea gets worse.  · Your nausea does not go away after two days.  · You vomit.  · You cannot drink fluids without vomiting.  · You have any of the following:  ? New symptoms.  ? A fever.  ? A headache.  ? Muscle cramps.  ? A rash.  ? Pain while urinating.  · You feel light-headed or dizzy.  Get help right away if:  · You have pain in your chest, neck, arm, or jaw.  · You feel extremely weak or you faint.  · You have vomit that is bright red or looks like coffee grounds.  · You have bloody or black stools or stools that look like tar.  · You have a severe headache, a stiff neck, or both.  · You have severe pain, cramping, or bloating in your abdomen.  · You have difficulty breathing or are breathing very quickly.  · Your heart is beating very quickly.  · Your skin feels  cold and clammy.  · You feel confused.  · You have signs of dehydration, such as:  ? Dark urine, very little urine, or no urine.  ? Cracked lips.  ? Dry mouth.  ? Sunken eyes.  ? Sleepiness.  ? Weakness.  These symptoms may represent a serious problem that is an emergency. Do not wait to see if the symptoms will go away. Get medical help right away. Call your local emergency services (911 in the [place] Do not drive yourself to the hospital.  Summary  · Nausea is the feeling that you have an upset stomach or that you are about to vomit. Nausea on its own is not usually a serious concern, but it may be an early sign of a more serious medical problem.  · If vomiting develops, or if you are not able to drink enough fluids, you are at risk of becoming dehydrated.  · Follow recommendations for eating and drinking and take over-the-counter and prescription medicines only as told by your health care provider.  · Contact a health care provider right away if your symptoms worsen or you have new symptoms.  · Keep all follow-up visits as told by your health care provider. This is important.  This information is not intended to replace advice given to you by your health care provider. Make sure you discuss any questions you have with your health care provider.  Document Released: 01/25/2006 Document Revised: 05/28/2019 Document Reviewed: 05/28/2019  Elsevier Patient Education © 2020 Elsevier Inc.

## 2020-09-12 LAB
ALBUMIN SERPL-MCNC: 4.8 G/DL (ref 4–5)
ALBUMIN/GLOB SERPL: 1.9 {RATIO} (ref 1.2–2.2)
ALP SERPL-CCNC: 41 IU/L (ref 39–117)
ALT SERPL-CCNC: 21 IU/L (ref 0–44)
AMYLASE SERPL-CCNC: 55 U/L (ref 31–110)
AST SERPL-CCNC: 24 IU/L (ref 0–40)
BASOPHILS # BLD AUTO: 0 X10E3/UL (ref 0–0.2)
BASOPHILS NFR BLD AUTO: 1 %
BILIRUB SERPL-MCNC: 0.5 MG/DL (ref 0–1.2)
BUN SERPL-MCNC: 19 MG/DL (ref 6–20)
BUN/CREAT SERPL: 18 (ref 9–20)
CALCIUM SERPL-MCNC: 9.5 MG/DL (ref 8.7–10.2)
CHLORIDE SERPL-SCNC: 102 MMOL/L (ref 96–106)
CO2 SERPL-SCNC: 25 MMOL/L (ref 20–29)
CREAT SERPL-MCNC: 1.08 MG/DL (ref 0.76–1.27)
EOSINOPHIL # BLD AUTO: 0.3 X10E3/UL (ref 0–0.4)
EOSINOPHIL NFR BLD AUTO: 7 %
ERYTHROCYTE [DISTWIDTH] IN BLOOD BY AUTOMATED COUNT: 14.3 % (ref 11.6–15.4)
GLOBULIN SER CALC-MCNC: 2.5 G/DL (ref 1.5–4.5)
GLUCOSE SERPL-MCNC: 93 MG/DL (ref 65–99)
HCT VFR BLD AUTO: 49.1 % (ref 37.5–51)
HGB BLD-MCNC: 16.5 G/DL (ref 13–17.7)
IMM GRANULOCYTES # BLD AUTO: 0 X10E3/UL (ref 0–0.1)
IMM GRANULOCYTES NFR BLD AUTO: 0 %
LIPASE SERPL-CCNC: 28 U/L (ref 13–78)
LYMPHOCYTES # BLD AUTO: 1.1 X10E3/UL (ref 0.7–3.1)
LYMPHOCYTES NFR BLD AUTO: 26 %
MCH RBC QN AUTO: 29.2 PG (ref 26.6–33)
MCHC RBC AUTO-ENTMCNC: 33.6 G/DL (ref 31.5–35.7)
MCV RBC AUTO: 87 FL (ref 79–97)
MONOCYTES # BLD AUTO: 0.3 X10E3/UL (ref 0.1–0.9)
MONOCYTES NFR BLD AUTO: 6 %
NEUTROPHILS # BLD AUTO: 2.5 X10E3/UL (ref 1.4–7)
NEUTROPHILS NFR BLD AUTO: 60 %
PLATELET # BLD AUTO: 169 X10E3/UL (ref 150–450)
POTASSIUM SERPL-SCNC: 4.5 MMOL/L (ref 3.5–5.2)
PROT SERPL-MCNC: 7.3 G/DL (ref 6–8.5)
RBC # BLD AUTO: 5.65 X10E6/UL (ref 4.14–5.8)
SODIUM SERPL-SCNC: 142 MMOL/L (ref 134–144)
WBC # BLD AUTO: 4.2 X10E3/UL (ref 3.4–10.8)

## 2020-09-18 DIAGNOSIS — E03.9 HYPOTHYROIDISM, UNSPECIFIED TYPE: ICD-10-CM

## 2020-09-18 RX ORDER — LEVOTHYROXINE SODIUM 88 MCG
88 TABLET ORAL DAILY
Qty: 30 TABLET | Refills: 1 | Status: SHIPPED | OUTPATIENT
Start: 2020-09-18 | End: 2020-11-30 | Stop reason: SDUPTHER

## 2020-09-28 ENCOUNTER — TELEPHONE (OUTPATIENT)
Dept: PHARMACY | Facility: HOSPITAL | Age: 35
End: 2020-09-28

## 2020-10-06 ENCOUNTER — TELEPHONE (OUTPATIENT)
Dept: PHARMACY | Facility: HOSPITAL | Age: 35
End: 2020-10-06

## 2020-10-09 ENCOUNTER — ANTICOAGULATION VISIT (OUTPATIENT)
Dept: PHARMACY | Facility: HOSPITAL | Age: 35
End: 2020-10-09

## 2020-10-09 DIAGNOSIS — Z79.01 ANTICOAGULATED ON COUMADIN: ICD-10-CM

## 2020-10-09 LAB
INR PPP: 3.4 (ref 0.91–1.09)
PROTHROMBIN TIME: 41 SECONDS (ref 10–13.8)

## 2020-10-09 PROCEDURE — G0463 HOSPITAL OUTPT CLINIC VISIT: HCPCS

## 2020-10-09 PROCEDURE — 85610 PROTHROMBIN TIME: CPT

## 2020-10-09 PROCEDURE — 36416 COLLJ CAPILLARY BLOOD SPEC: CPT

## 2020-10-09 NOTE — PROGRESS NOTES
Anticoagulation Clinic Progress Note    Anticoagulation Summary  As of 10/9/2020    INR goal:  2.0-3.0   TTR:  49.9 % (2 y)   INR used for dosing:  3.4 (10/9/2020)   Warfarin maintenance plan:  7.5 mg every Mon, Thu; 5 mg all other days   Weekly warfarin total:  40 mg   Plan last modified:  Ruben Washington RPH (10/9/2020)   Next INR check:  10/23/2020   Priority:  Maintenance   Target end date:  Indefinite    Indications    Anticoagulated on Coumadin [Z79.01]  DVT (deep venous thrombosis) (CMS/[facility]) [I82.409] [I82.409]             Anticoagulation Episode Summary     INR check location:      Preferred lab:      Send INR reminders to:  [facility]    Comments:        Anticoagulation Care Providers     Provider Role Specialty Phone number    Lynda Serrato MD Referring Cardiology [phone]          Clinic Interview:  Patient Findings     Positives:  Change in health, Change in diet/appetite    Negatives:  Signs/symptoms of thrombosis, Signs/symptoms of bleeding,   Laboratory test error suspected, Change in alcohol use, Change in   activity, Upcoming invasive procedure, Emergency department visit,   Upcoming dental procedure, Missed doses, Extra doses, Change in   medications, Hospital admission, Bruising, Other complaints    Comments:  Low carb diet past 2 months, lost 15 lbs.       Clinical Outcomes     Negatives:  Major bleeding event, Thromboembolic event,   Anticoagulation-related hospital admission, Anticoagulation-related ED   visit, Anticoagulation-related fatality    Comments:  Low carb diet past 2 months, lost 15 lbs.         INR History:  Anticoagulation Monitoring 5/20/2020 7/24/2020 10/9/2020   INR 3.1 2.7 3.4   INR Date 5/20/2020 7/24/2020 10/9/2020   INR Goal 2.0-3.0 2.0-3.0 2.0-3.0   Trend Same Same Down   Last Week Total 42.5 mg 42.5 mg 42.5 mg   Next Week Total 42.5 mg 42.5 mg 40 mg   Sun 5 mg 5 mg 5 mg   Mon 7.5 mg 7.5 mg 7.5 mg   Tue 5 mg 5 mg 5 mg   Wed 7.5 mg 7.5 mg 5 mg   Thu 5 mg  5 mg 7.5 mg   Fri 7.5 mg 7.5 mg 5 mg   Sat 5 mg 5 mg 5 mg   Visit Report - - -   Some recent data might be hidden       Plan:  1. INR is Supratherapeutic today- see above in Anticoagulation Summary.  Will instruct Scott Allen to Change their warfarin regimen- see above in Anticoagulation Summary.  2. Follow up in 2 weeks  3. Patient declines warfarin refills.  4. Verbal and written information provided. Patient expresses understanding and has no further questions at this time.    Ruben Washington [facility]

## 2020-10-26 RX ORDER — WARFARIN SODIUM 5 MG/1
TABLET ORAL
Qty: 110 TABLET | Refills: 0 | Status: SHIPPED | OUTPATIENT
Start: 2020-10-26 | End: 2020-10-26 | Stop reason: SDUPTHER

## 2020-10-26 NOTE — TELEPHONE ENCOUNTER
Next appt: 11/13/2020 (Serrato)  Last appt: 4/14/2020 (Serrato)  Last labs: 10/9/2020 (Medical Management)

## 2020-10-26 NOTE — TELEPHONE ENCOUNTER
Next appt: 11/13/2020 (Rojelio)  Last appt: 4/14/2020 (Grecia)  Last lab: 10/9/2020 (Medical Management)

## 2020-10-27 RX ORDER — WARFARIN SODIUM 5 MG/1
TABLET ORAL
Qty: 110 TABLET | Refills: 0 | Status: SHIPPED | OUTPATIENT
Start: 2020-10-27 | End: 2020-12-24 | Stop reason: SDUPTHER

## 2020-11-03 ENCOUNTER — TELEPHONE (OUTPATIENT)
Dept: PHARMACY | Facility: HOSPITAL | Age: 35
End: 2020-11-03

## 2020-11-10 ENCOUNTER — TELEPHONE (OUTPATIENT)
Dept: PHARMACY | Facility: HOSPITAL | Age: 35
End: 2020-11-10

## 2020-11-12 ENCOUNTER — OFFICE VISIT (OUTPATIENT)
Dept: FAMILY MEDICINE CLINIC | Facility: CLINIC | Age: 35
End: 2020-11-12

## 2020-11-12 VITALS
OXYGEN SATURATION: 98 % | WEIGHT: 205 LBS | HEIGHT: 70 IN | SYSTOLIC BLOOD PRESSURE: 122 MMHG | RESPIRATION RATE: 16 BRPM | TEMPERATURE: 96.8 F | DIASTOLIC BLOOD PRESSURE: 70 MMHG | BODY MASS INDEX: 29.35 KG/M2 | HEART RATE: 71 BPM

## 2020-11-12 DIAGNOSIS — E03.9 ACQUIRED HYPOTHYROIDISM: ICD-10-CM

## 2020-11-12 DIAGNOSIS — Z13.220 SCREENING FOR HYPERLIPIDEMIA: ICD-10-CM

## 2020-11-12 DIAGNOSIS — Z72.52 HIGH RISK HOMOSEXUAL BEHAVIOR: ICD-10-CM

## 2020-11-12 DIAGNOSIS — Z23 NEED FOR VACCINATION: ICD-10-CM

## 2020-11-12 DIAGNOSIS — Z00.00 ANNUAL PHYSICAL EXAM: Primary | ICD-10-CM

## 2020-11-12 DIAGNOSIS — Z79.899 HIGH RISK MEDICATION USE: ICD-10-CM

## 2020-11-12 DIAGNOSIS — Z11.59 NEED FOR HEPATITIS C SCREENING TEST: ICD-10-CM

## 2020-11-12 LAB
BASOPHILS # BLD AUTO: 0.04 10*3/MM3 (ref 0–0.2)
BASOPHILS NFR BLD AUTO: 0.9 % (ref 0–1.5)
EOSINOPHIL # BLD AUTO: 0.2 10*3/MM3 (ref 0–0.4)
EOSINOPHIL NFR BLD AUTO: 4.7 % (ref 0.3–6.2)
ERYTHROCYTE [DISTWIDTH] IN BLOOD BY AUTOMATED COUNT: 14.1 % (ref 12.3–15.4)
HCT VFR BLD AUTO: 47.9 % (ref 37.5–51)
HGB BLD-MCNC: 15.9 G/DL (ref 13–17.7)
IMM GRANULOCYTES # BLD AUTO: 0.01 10*3/MM3 (ref 0–0.05)
IMM GRANULOCYTES NFR BLD AUTO: 0.2 % (ref 0–0.5)
LYMPHOCYTES # BLD AUTO: 1.07 10*3/MM3 (ref 0.7–3.1)
LYMPHOCYTES NFR BLD AUTO: 25.1 % (ref 19.6–45.3)
MCH RBC QN AUTO: 29.3 PG (ref 26.6–33)
MCHC RBC AUTO-ENTMCNC: 33.2 G/DL (ref 31.5–35.7)
MCV RBC AUTO: 88.4 FL (ref 79–97)
MONOCYTES # BLD AUTO: 0.28 10*3/MM3 (ref 0.1–0.9)
MONOCYTES NFR BLD AUTO: 6.6 % (ref 5–12)
NEUTROPHILS # BLD AUTO: 2.67 10*3/MM3 (ref 1.7–7)
NEUTROPHILS NFR BLD AUTO: 62.5 % (ref 42.7–76)
NRBC BLD AUTO-RTO: 0 /100 WBC (ref 0–0.2)
PLATELET # BLD AUTO: 164 10*3/MM3 (ref 140–450)
RBC # BLD AUTO: 5.42 10*6/MM3 (ref 4.14–5.8)
WBC # BLD AUTO: 4.27 10*3/MM3 (ref 3.4–10.8)

## 2020-11-12 PROCEDURE — 90686 IIV4 VACC NO PRSV 0.5 ML IM: CPT | Performed by: NURSE PRACTITIONER

## 2020-11-12 PROCEDURE — 90471 IMMUNIZATION ADMIN: CPT | Performed by: NURSE PRACTITIONER

## 2020-11-12 PROCEDURE — 99395 PREV VISIT EST AGE 18-39: CPT | Performed by: NURSE PRACTITIONER

## 2020-11-12 NOTE — PATIENT INSTRUCTIONS
I will call you with your lab results.   Please call with any questions or concerns.   Follow-up in 1 year for annual and labs.     Annual Wellness  Personal Prevention Plan of Service     Date of Office Visit:  2020  Encounter Provider:  DAVE Finley  Place of Service:  [facility]  Patient Name: Scott Allen  :  1985    As part of the Annual Wellness portion of your visit today, we are providing you with this personalized preventive plan of services (PPPS). This plan is based upon recommendations of the [place] Preventive Services Task Force (USPSTF) and the Advisory Committee on Immunization Practices (ACIP).    This lists the preventive care services that should be considered, and provides dates of when you are due. Items listed as completed are up-to-date and do not require any further intervention.    Health Maintenance   Topic Date Due   • HEPATITIS C SCREENING  04/15/2017   • TDAP/TD VACCINES (3 - Td) 2021   • ANNUAL PHYSICAL  2021   • INFLUENZA VACCINE  Completed   • Hepatitis B  Aged Out   • Pneumococcal Vaccine 0-64  Aged Out       Orders Placed This Encounter   Procedures   • FluLaval Quad >6 Months ([id number])       No follow-ups on file.

## 2020-11-12 NOTE — PROGRESS NOTES
Preventive Exam    History of Present Illness: Scott Allen is a 35 y.o. here for check up and review of routine health maintenance. he states he is doing well and has no concerns.    Patient has hypothyroidism, chronic, ongoing. Is taking leothyroxine 88mcg 1 p.o. QAM. He reports that he is tolerating medications well and denies any side affects.    Patient is also requesting HIV testing.     Past medical history, surgical history and family history have been reviewed.       Review of Systems   Constitutional: Negative for appetite change, chills, fatigue, fever, unexpected weight gain and unexpected weight loss.   HENT: Positive for congestion (seasonal allergies). Negative for dental problem, rhinorrhea, sinus pressure and sore throat.         Dental exam is due.    Eyes: Negative.  Negative for blurred vision, double vision and photophobia.        Wears contacts. Eye exam is up to date.    Respiratory: Negative for cough, chest tightness, shortness of breath and wheezing.    Cardiovascular: Negative for chest pain, palpitations and leg swelling.   Gastrointestinal: Negative for abdominal pain, constipation, diarrhea, nausea, vomiting and indigestion.   Endocrine: Negative.  Negative for cold intolerance and heat intolerance.   Genitourinary: Negative.  Negative for decreased libido, discharge, dysuria, frequency, penile pain, penile swelling, scrotal swelling and urgency.   Musculoskeletal: Negative for arthralgias, back pain, joint swelling and myalgias.   Skin: Negative.    Allergic/Immunologic: Positive for environmental allergies. Negative for food allergies.   Neurological: Negative for dizziness, weakness, numbness and headache.   Hematological: Bruises/bleeds easily.   Psychiatric/Behavioral: Negative.  Negative for sleep disturbance and depressed mood. The patient is not nervous/anxious.        PHYSICAL EXAM    Vitals:    11/12/20 0855   BP: 122/70   Pulse: 71   Resp: 16   Temp: 96.8 °F (36 °C)   SpO2:  98%     Body mass index is 29.41 kg/m².      Physical Exam  Vitals signs and nursing note reviewed.   Constitutional:       Appearance: He is well-developed.   HENT:      Head: Normocephalic and atraumatic.      Right Ear: Tympanic membrane, ear canal and external ear normal.      Left Ear: Tympanic membrane, ear canal and external ear normal.      Nose: Nose normal.      Mouth/Throat:      Lips: Pink.      Mouth: Mucous membranes are moist.      Tongue: No lesions.      Palate: No mass and lesions.      Pharynx: Oropharynx is clear. Uvula midline.      Tonsils: No tonsillar exudate.   Eyes:      Conjunctiva/sclera: Conjunctivae normal.      Pupils: Pupils are equal, round, and reactive to light.   Neck:      Musculoskeletal: Normal range of motion and neck supple.      Thyroid: No thyromegaly.   Cardiovascular:      Rate and Rhythm: Normal rate and regular rhythm.      Pulses: Normal pulses.           Dorsalis pedis pulses are 2+ on the right side and 2+ on the left side.        Posterior tibial pulses are 2+ on the right side and 2+ on the left side.      Heart sounds: Normal heart sounds. No murmur.   Pulmonary:      Effort: Pulmonary effort is normal.      Breath sounds: Normal breath sounds.   Abdominal:      General: Bowel sounds are normal. There is no distension.      Palpations: Abdomen is soft.      Tenderness: There is no abdominal tenderness.   Musculoskeletal: Normal range of motion.         General: No deformity.      Right lower leg: No edema.      Left lower leg: No edema.   Lymphadenopathy:      Head:      Right side of head: No submental, submandibular, tonsillar, preauricular, posterior auricular or occipital adenopathy.      Left side of head: No submental, submandibular, tonsillar, preauricular, posterior auricular or occipital adenopathy.      Cervical: No cervical adenopathy.      Right cervical: No superficial, deep or posterior cervical adenopathy.     Left cervical: No superficial, deep or  posterior cervical adenopathy.      Upper Body:      Right upper body: No supraclavicular adenopathy.      Left upper body: No supraclavicular adenopathy.   Skin:     General: Skin is warm and dry.      Capillary Refill: Capillary refill takes 2 to 3 seconds.   Neurological:      General: No focal deficit present.      Mental Status: He is alert and oriented to person, place, and time.      Cranial Nerves: Cranial nerves are intact. No cranial nerve deficit.      Sensory: Sensation is intact.      Motor: Motor function is intact.      Coordination: Coordination is intact.      Gait: Gait is intact.   Psychiatric:         Attention and Perception: Attention and perception normal.         Mood and Affect: Mood normal.         Speech: Speech normal.         Behavior: Behavior normal. Behavior is cooperative.         Thought Content: Thought content normal.         Cognition and Memory: Cognition and memory normal.         Judgment: Judgment normal.         Procedures    Diagnoses and all orders for this visit:    1. Annual physical exam (Primary)  -     Comprehensive Metabolic Panel  -     Lipid Panel With / Chol / HDL Ratio  -     CBC & Differential  -     Hepatitis C Antibody  -     TSH  -     HIV-1 / O / 2 Ag / Antibody 4th Generation    2. Need for vaccination  -     FluLaval Quad >6 Months (3304-6788)    3. Screening for hyperlipidemia  -     Lipid Panel With / Chol / HDL Ratio    4. High risk medication use  -     Comprehensive Metabolic Panel  -     CBC & Differential    5. Need for hepatitis C screening test  -     Hepatitis C Antibody    6. Acquired hypothyroidism  -     TSH    7. High risk homosexual behavior  -     HIV-1 / O / 2 Ag / Antibody 4th Generation        Problems Addressed this Visit        Endocrine    Hypothyroidism    Relevant Orders    TSH      Other Visit Diagnoses     Annual physical exam    -  Primary    Relevant Orders    Comprehensive Metabolic Panel    Lipid Panel With / Chol / HDL Ratio     CBC & Differential    Hepatitis C Antibody    TSH    HIV-1 / O / 2 Ag / Antibody 4th Generation    Need for vaccination        Relevant Orders    FluLaval Quad >6 Months (6431-7543) (Completed)    Screening for hyperlipidemia        Relevant Orders    Lipid Panel With / Chol / HDL Ratio    High risk medication use        Relevant Orders    Comprehensive Metabolic Panel    CBC & Differential    Need for hepatitis C screening test        Relevant Orders    Hepatitis C Antibody    High risk homosexual behavior        Relevant Orders    HIV-1 / O / 2 Ag / Antibody 4th Generation      Diagnoses       Codes Comments    Annual physical exam    -  Primary ICD-10-CM: Z00.00  ICD-9-CM: V70.0     Need for vaccination     ICD-10-CM: Z23  ICD-9-CM: V05.9     Screening for hyperlipidemia     ICD-10-CM: Z13.220  ICD-9-CM: V77.91     High risk medication use     ICD-10-CM: Z79.899  ICD-9-CM: V58.69     Need for hepatitis C screening test     ICD-10-CM: Z11.59  ICD-9-CM: V73.89     Acquired hypothyroidism     ICD-10-CM: E03.9  ICD-9-CM: 244.9     High risk homosexual behavior     ICD-10-CM: Z72.52  ICD-9-CM: V69.2           Routine health maintenance reviewed and discussed with Scott Allen.    Preventative counseling regarding healthy diet and exercise.   Pt reports that he wears a seatbelt regularly.    Follow-up in 1 year for annual and labs.     Patient Instructions     I will call you with your lab results.   Please call with any questions or concerns.   Follow-up in 1 year for annual and labs.     Annual Wellness  Personal Prevention Plan of Service     Date of Office Visit:  2020  Encounter Provider:  DAVE Finley  Place of Service:  [facility]  Patient Name: Scott Allen  :  1985    As part of the Annual Wellness portion of your visit today, we are providing you with this personalized preventive plan of services (PPPS). This plan is based upon recommendations of  the [place] Preventive Services Task Force (USPSTF) and the Advisory Committee on Immunization Practices (ACIP).    This lists the preventive care services that should be considered, and provides dates of when you are due. Items listed as completed are up-to-date and do not require any further intervention.    Health Maintenance   Topic Date Due   • HEPATITIS C SCREENING  04/15/2017   • TDAP/TD VACCINES (3 - Td) 05/03/2021   • ANNUAL PHYSICAL  11/13/2021   • INFLUENZA VACCINE  Completed   • Hepatitis B  Aged Out   • Pneumococcal Vaccine 0-64  Aged Out       Orders Placed This Encounter   Procedures   • FluLaval Quad >6 Months (1104-2386)       No follow-ups on file.

## 2020-11-13 LAB
ALBUMIN SERPL-MCNC: 4.6 G/DL (ref 3.5–5.2)
ALBUMIN/GLOB SERPL: 2.2 G/DL
ALP SERPL-CCNC: 47 U/L (ref 39–117)
ALT SERPL-CCNC: 19 U/L (ref 1–41)
AST SERPL-CCNC: 26 U/L (ref 1–40)
BILIRUB SERPL-MCNC: 0.4 MG/DL (ref 0–1.2)
BUN SERPL-MCNC: 22 MG/DL (ref 6–20)
BUN/CREAT SERPL: 22 (ref 7–25)
CALCIUM SERPL-MCNC: 9 MG/DL (ref 8.6–10.5)
CHLORIDE SERPL-SCNC: 104 MMOL/L (ref 98–107)
CHOLEST SERPL-MCNC: 136 MG/DL (ref 0–200)
CHOLEST/HDLC SERPL: 3.68 {RATIO}
CO2 SERPL-SCNC: 28 MMOL/L (ref 22–29)
CREAT SERPL-MCNC: 1 MG/DL (ref 0.76–1.27)
GLOBULIN SER CALC-MCNC: 2.1 GM/DL
GLUCOSE SERPL-MCNC: 87 MG/DL (ref 65–99)
HCV AB S/CO SERPL IA: <0.1 S/CO RATIO (ref 0–0.9)
HDLC SERPL-MCNC: 37 MG/DL (ref 40–60)
HIV 1+2 AB+HIV1 P24 AG SERPL QL IA: NON REACTIVE
LDLC SERPL CALC-MCNC: 83 MG/DL (ref 0–100)
POTASSIUM SERPL-SCNC: 4.3 MMOL/L (ref 3.5–5.2)
PROT SERPL-MCNC: 6.7 G/DL (ref 6–8.5)
SODIUM SERPL-SCNC: 140 MMOL/L (ref 136–145)
TRIGL SERPL-MCNC: 81 MG/DL (ref 0–150)
TSH SERPL DL<=0.005 MIU/L-ACNC: 1.35 UIU/ML (ref 0.27–4.2)
VLDLC SERPL CALC-MCNC: 16 MG/DL (ref 5–40)

## 2020-11-30 DIAGNOSIS — E03.9 HYPOTHYROIDISM, UNSPECIFIED TYPE: ICD-10-CM

## 2020-11-30 RX ORDER — LEVOTHYROXINE SODIUM 88 MCG
88 TABLET ORAL DAILY
Qty: 30 TABLET | Refills: 5 | Status: SHIPPED | OUTPATIENT
Start: 2020-11-30

## 2020-12-24 ENCOUNTER — ANTICOAGULATION VISIT (OUTPATIENT)
Dept: PHARMACY | Facility: HOSPITAL | Age: 35
End: 2020-12-24

## 2020-12-24 DIAGNOSIS — Z79.01 ANTICOAGULATED ON COUMADIN: ICD-10-CM

## 2020-12-24 LAB
INR PPP: 3.4 (ref 0.91–1.09)
PROTHROMBIN TIME: 40.5 SECONDS (ref 10–13.8)

## 2020-12-24 PROCEDURE — 85610 PROTHROMBIN TIME: CPT

## 2020-12-24 PROCEDURE — G0463 HOSPITAL OUTPT CLINIC VISIT: HCPCS

## 2020-12-24 PROCEDURE — 36416 COLLJ CAPILLARY BLOOD SPEC: CPT

## 2020-12-24 RX ORDER — WARFARIN SODIUM 5 MG/1
TABLET ORAL
Qty: 105 TABLET | Refills: 1 | Status: SHIPPED | OUTPATIENT
Start: 2020-12-24

## 2020-12-24 NOTE — PROGRESS NOTES
Anticoagulation Clinic Progress Note    Anticoagulation Summary  As of 12/24/2020    INR goal:  2.0-3.0   TTR:  45.2 % (2.2 y)   INR used for dosing:  3.4 (12/24/2020)   Warfarin maintenance plan:  7.5 mg every Mon, Thu; 5 mg all other days   Weekly warfarin total:  40 mg   Plan last modified:  Ruben Washington RPH (10/9/2020)   Next INR check:  1/11/2021   Priority:  Maintenance   Target end date:  Indefinite    Indications    Anticoagulated on Coumadin [Z79.01]  DVT (deep venous thrombosis) (CMS/[facility]) [I82.409] [I82.409]             Anticoagulation Episode Summary     INR check location:      Preferred lab:      Send INR reminders to:  [facility]    Comments:        Anticoagulation Care Providers     Provider Role Specialty Phone number    Lynda Serrato MD Referring Cardiology [phone]          Clinic Interview:  Patient Findings     Negatives:  Signs/symptoms of thrombosis, Signs/symptoms of bleeding,   Laboratory test error suspected, Change in health, Change in alcohol use,   Change in activity, Upcoming invasive procedure, Emergency department   visit, Upcoming dental procedure, Missed doses, Extra doses, Change in   medications, Change in diet/appetite, Hospital admission, Bruising, Other   complaints      Clinical Outcomes     Negatives:  Major bleeding event, Thromboembolic event,   Anticoagulation-related hospital admission, Anticoagulation-related ED   visit, Anticoagulation-related fatality        INR History:  Anticoagulation Monitoring 7/24/2020 10/9/2020 12/24/2020   INR 2.7 3.4 3.4   INR Date 7/24/2020 10/9/2020 12/24/2020   INR Goal 2.0-3.0 2.0-3.0 2.0-3.0   Trend Same Down Same   Last Week Total 42.5 mg 42.5 mg 40 mg   Next Week Total 42.5 mg 40 mg 37.5 mg   Sun 5 mg 5 mg 5 mg   Mon 7.5 mg 7.5 mg 7.5 mg   Tue 5 mg 5 mg 5 mg   Wed 7.5 mg 5 mg 5 mg   Thu 5 mg 7.5 mg 5 mg (12/24); Otherwise 7.5 mg   Fri 7.5 mg 5 mg 5 mg   Sat 5 mg 5 mg 5 mg   Visit Report - - -   Some recent data  might be hidden       Plan:  1. INR is Supratherapeutic today- see above in Anticoagulation Summary.  Will instruct Scott Allen to Change their warfarin regimen- see above in Anticoagulation Summary.  2. Follow up in 2.5 weeks  3. Patient desires warfarin refills.  4. Verbal and written information provided. Patient expresses understanding and has no further questions at this time.    Ruben Washington [facility]

## 2021-01-07 ENCOUNTER — ANTICOAGULATION VISIT (OUTPATIENT)
Dept: PHARMACY | Facility: HOSPITAL | Age: 36
End: 2021-01-07

## 2021-01-07 DIAGNOSIS — Z79.01 ANTICOAGULATED ON COUMADIN: ICD-10-CM

## 2021-01-07 LAB
INR PPP: 1.8 (ref 0.91–1.09)
PROTHROMBIN TIME: 21.6 SECONDS (ref 10–13.8)

## 2021-01-07 PROCEDURE — 36416 COLLJ CAPILLARY BLOOD SPEC: CPT

## 2021-01-07 PROCEDURE — 85610 PROTHROMBIN TIME: CPT

## 2021-01-07 NOTE — PROGRESS NOTES
Anticoagulation Clinic Progress Note    Anticoagulation Summary  As of 2021    INR goal:  2.0-3.0   TTR:  45.5 % (2.2 y)   INR used for dosin.8 (2021)   Warfarin maintenance plan:  7.5 mg every Mon, Thu; 5 mg all other days   Weekly warfarin total:  40 mg   Plan last modified:  Ruben Washington RPH (10/9/2020)   Next INR check:  2021   Priority:  Maintenance   Target end date:  Indefinite    Indications    Anticoagulated on Coumadin [Z79.01]  DVT (deep venous thrombosis) (CMS/[facility]) [I82.409] [I82.409]             Anticoagulation Episode Summary     INR check location:      Preferred lab:      Send INR reminders to:  [facility]    Comments:        Anticoagulation Care Providers     Provider Role Specialty Phone number    Lynda Serrato MD Referring Cardiology [phone]          Clinic Interview:  Patient Findings     Negatives:  Signs/symptoms of thrombosis, Signs/symptoms of bleeding,   Laboratory test error suspected, Change in health, Change in alcohol use,   Change in activity, Upcoming invasive procedure, Emergency department   visit, Upcoming dental procedure, Missed doses, Extra doses, Change in   medications, Change in diet/appetite, Hospital admission, Bruising, Other   complaints      Clinical Outcomes     Negatives:  Major bleeding event, Thromboembolic event,   Anticoagulation-related hospital admission, Anticoagulation-related ED   visit, Anticoagulation-related fatality        INR History:  Anticoagulation Monitoring 10/9/2020 2020 2021   INR 3.4 3.4 1.8   INR Date 10/9/2020 2020 2021   INR Goal 2.0-3.0 2.0-3.0 2.0-3.0   Trend Down Same Same   Last Week Total 42.5 mg 40 mg 40 mg   Next Week Total 40 mg 37.5 mg 40 mg   Sun 5 mg 5 mg 5 mg   Mon 7.5 mg 7.5 mg 7.5 mg   Tue 5 mg 5 mg 5 mg   Wed 5 mg 5 mg 5 mg   Thu 7.5 mg 5 mg (); Otherwise 7.5 mg 7.5 mg   Fri 5 mg 5 mg 5 mg   Sat 5 mg 5 mg 5 mg   Visit Report - - -   Some recent data might be hidden        Plan:  1. INR is Subtherapeutic today- see above in Anticoagulation Summary.  Will instruct Scott Allen to Continue their warfarin regimen- see above in Anticoagulation Summary.  2. Follow up in 2 weeks  3. Patient declines warfarin refills.  4. Verbal and written information provided. Patient expresses understanding and has no further questions at this time.    Rebekah Villa RP

## 2021-01-12 ENCOUNTER — OFFICE VISIT (OUTPATIENT)
Dept: FAMILY MEDICINE CLINIC | Facility: CLINIC | Age: 36
End: 2021-01-12

## 2021-01-12 VITALS
OXYGEN SATURATION: 98 % | RESPIRATION RATE: 16 BRPM | HEIGHT: 70 IN | DIASTOLIC BLOOD PRESSURE: 70 MMHG | WEIGHT: 215 LBS | HEART RATE: 78 BPM | SYSTOLIC BLOOD PRESSURE: 120 MMHG | TEMPERATURE: 97.8 F | BODY MASS INDEX: 30.78 KG/M2

## 2021-01-12 DIAGNOSIS — M79.10 MYALGIA: Primary | ICD-10-CM

## 2021-01-12 PROCEDURE — 99213 OFFICE O/P EST LOW 20 MIN: CPT | Performed by: NURSE PRACTITIONER

## 2021-01-12 NOTE — PROGRESS NOTES
Subjective   Scott Allen is a 35 y.o. male.     History of Present Illness   Patient presents with muscle cramping and fatigue in his hands that has been going on for about 2 weeks.  Patient denies any recent changes to activity or increased workload. He states that it feels like he initially had overused the muscles. He states that his discomfort has improved some. He states that he has taken tylenol off and on and it has helped with the cramping.    The following portions of the patient's history were reviewed and updated as appropriate: allergies, current medications, past family history, past medical history, past social history, past surgical history and problem list.    Review of Systems   Constitutional: Negative for chills, fatigue and fever.   Respiratory: Negative for cough and shortness of breath.    Cardiovascular: Negative for chest pain, palpitations and leg swelling.   Musculoskeletal: Positive for myalgias. Negative for arthralgias, back pain, gait problem, joint swelling, neck pain and neck stiffness.        Muscle fatigue   Skin: Negative for dry skin.   Neurological: Negative for dizziness, weakness, numbness and headache.   Psychiatric/Behavioral: Negative.        Objective   Physical Exam  Vitals signs and nursing note reviewed.   Constitutional:       Appearance: He is well-developed.   HENT:      Head: Normocephalic and atraumatic.   Eyes:      Conjunctiva/sclera: Conjunctivae normal.      Pupils: Pupils are equal, round, and reactive to light.   Neck:      Musculoskeletal: Full passive range of motion without pain, normal range of motion and neck supple.      Thyroid: No thyromegaly.   Cardiovascular:      Rate and Rhythm: Normal rate and regular rhythm.      Heart sounds: Normal heart sounds. No murmur.   Pulmonary:      Effort: Pulmonary effort is normal.      Breath sounds: Normal breath sounds.   Musculoskeletal: Normal range of motion.         General: No deformity.      Cervical back:  Normal.      Thoracic back: Normal.      Lumbar back: Normal.      Comments: Hands bilaterally normal in appearance. ROM is normal bilaterally. Radial pulses normal. Capillary refill intact bilaterally.    Lymphadenopathy:      Cervical: No cervical adenopathy.   Skin:     General: Skin is warm and dry.   Neurological:      General: No focal deficit present.      Mental Status: He is alert and oriented to person, place, and time. Mental status is at baseline.   Psychiatric:         Behavior: Behavior normal.         Thought Content: Thought content normal.         Judgment: Judgment normal.         Vitals:    01/12/21 1547   BP: 120/70   Pulse: 78   Resp: 16   Temp: 97.8 °F (36.6 °C)   SpO2: 98%     Body mass index is 30.85 kg/m².    Procedures    Assessment/Plan   Problems Addressed this Visit     None      Visit Diagnoses     Myalgia    -  Primary    Relevant Orders    CK    Magnesium    Comprehensive Metabolic Panel      Diagnoses       Codes Comments    Myalgia    -  Primary ICD-10-CM: M79.10  ICD-9-CM: 729.1         Patient had his first Covid vaccine yesterday.  Return if symptoms worsen or fail to improve.   Will check CK, CMP and Magnesium.

## 2021-01-13 LAB
ALBUMIN SERPL-MCNC: 4.8 G/DL (ref 4–5)
ALBUMIN/GLOB SERPL: 2 {RATIO} (ref 1.2–2.2)
ALP SERPL-CCNC: 49 IU/L (ref 39–117)
ALT SERPL-CCNC: 18 IU/L (ref 0–44)
AST SERPL-CCNC: 19 IU/L (ref 0–40)
BILIRUB SERPL-MCNC: 0.6 MG/DL (ref 0–1.2)
BUN SERPL-MCNC: 18 MG/DL (ref 6–20)
BUN/CREAT SERPL: 18 (ref 9–20)
CALCIUM SERPL-MCNC: 9.4 MG/DL (ref 8.7–10.2)
CHLORIDE SERPL-SCNC: 103 MMOL/L (ref 96–106)
CK SERPL-CCNC: 172 U/L (ref 49–439)
CO2 SERPL-SCNC: 24 MMOL/L (ref 20–29)
CREAT SERPL-MCNC: 1 MG/DL (ref 0.76–1.27)
GLOBULIN SER CALC-MCNC: 2.4 G/DL (ref 1.5–4.5)
GLUCOSE SERPL-MCNC: 83 MG/DL (ref 65–99)
MAGNESIUM SERPL-MCNC: 2.1 MG/DL (ref 1.6–2.3)
POTASSIUM SERPL-SCNC: 4.1 MMOL/L (ref 3.5–5.2)
PROT SERPL-MCNC: 7.2 G/DL (ref 6–8.5)
SODIUM SERPL-SCNC: 141 MMOL/L (ref 134–144)

## 2021-01-21 ENCOUNTER — APPOINTMENT (OUTPATIENT)
Dept: PHARMACY | Facility: HOSPITAL | Age: 36
End: 2021-01-21

## 2021-01-21 ENCOUNTER — TELEPHONE (OUTPATIENT)
Dept: PHARMACY | Facility: HOSPITAL | Age: 36
End: 2021-01-21

## 2021-01-22 ENCOUNTER — ANTICOAGULATION VISIT (OUTPATIENT)
Dept: PHARMACY | Facility: HOSPITAL | Age: 36
End: 2021-01-22

## 2021-01-22 DIAGNOSIS — Z79.01 ANTICOAGULATED ON COUMADIN: ICD-10-CM

## 2021-01-22 LAB
INR PPP: 2.3 (ref 0.91–1.09)
PROTHROMBIN TIME: 28.1 SECONDS (ref 10–13.8)

## 2021-01-22 PROCEDURE — 85610 PROTHROMBIN TIME: CPT

## 2021-01-22 PROCEDURE — 36416 COLLJ CAPILLARY BLOOD SPEC: CPT

## 2021-01-22 NOTE — PROGRESS NOTES
Anticoagulation Clinic Progress Note    Anticoagulation Summary  As of 2021    INR goal:  2.0-3.0   TTR:  45.7 % (2.2 y)   INR used for dosin.3 (2021)   Warfarin maintenance plan:  7.5 mg every Mon, Thu; 5 mg all other days   Weekly warfarin total:  40 mg   No change documented:  Dixie Rodríguez RP   Plan last modified:  Ruben Washington RPH (10/9/2020)   Next INR check:  2021   Priority:  Maintenance   Target end date:  Indefinite    Indications    Anticoagulated on Coumadin [Z79.01]  DVT (deep venous thrombosis) (CMS/[facility]) [I82.409] [I82.409]             Anticoagulation Episode Summary     INR check location:      Preferred lab:      Send INR reminders to:  [facility]    Comments:        Anticoagulation Care Providers     Provider Role Specialty Phone number    Lynda Serrato MD Referring Cardiology [phone]          Clinic Interview:      INR History:  Anticoagulation Monitoring 2020   INR 3.4 1.8 2.3   INR Date 2020   INR Goal 2.0-3.0 2.0-3.0 2.0-3.0   Trend Same Same Same   Last Week Total 40 mg 40 mg 40 mg   Next Week Total 37.5 mg 40 mg 40 mg   Sun 5 mg 5 mg 5 mg   Mon 7.5 mg 7.5 mg 7.5 mg   Tue 5 mg 5 mg 5 mg   Wed 5 mg 5 mg 5 mg   Thu 5 mg (); Otherwise 7.5 mg 7.5 mg 7.5 mg   Fri 5 mg 5 mg 5 mg   Sat 5 mg 5 mg 5 mg   Visit Report - - -   Some recent data might be hidden       Plan:  1. INR is Therapeutic today- see above in Anticoagulation Summary.  Will instruct Scott Allen to Continue their warfarin regimen- see above in Anticoagulation Summary.  2. Follow up in 1 month  3. Patient declines warfarin refills.  4. Verbal and written information provided. Patient expresses understanding and has no further questions at this time.    Dixie Rodríguez RP

## 2021-02-18 ENCOUNTER — TELEPHONE (OUTPATIENT)
Dept: CARDIOLOGY | Facility: CLINIC | Age: 36
End: 2021-02-18

## 2021-02-18 ENCOUNTER — TELEMEDICINE (OUTPATIENT)
Dept: CARDIOLOGY | Facility: CLINIC | Age: 36
End: 2021-02-18

## 2021-02-18 VITALS — HEIGHT: 71 IN | WEIGHT: 205 LBS | BODY MASS INDEX: 28.7 KG/M2

## 2021-02-18 DIAGNOSIS — R93.1 ABNORMAL ECHOCARDIOGRAM: Primary | ICD-10-CM

## 2021-02-18 DIAGNOSIS — Z86.79 HISTORY OF PERICARDITIS: ICD-10-CM

## 2021-02-18 DIAGNOSIS — I82.B19 OCCLUSION OF SUBCLAVIAN VEIN, UNSPECIFIED LATERALITY (HCC): ICD-10-CM

## 2021-02-18 DIAGNOSIS — Z79.01 CURRENT USE OF LONG TERM ANTICOAGULATION: ICD-10-CM

## 2021-02-18 PROCEDURE — 99214 OFFICE O/P EST MOD 30 MIN: CPT | Performed by: NURSE PRACTITIONER

## 2021-02-18 NOTE — TELEPHONE ENCOUNTER
----- Message from Jacqui Hagan, FRANCIS, APRN sent at 2/18/2021  3:48 PM EST -----  Patient needs echocardiogram done within the next week or 2.  He needs a 6-month follow-up appointment in the office with Dr. Serrato.  Please also let him know that he does need to see a vascular surgeon and referral has been placed, let us know if he has not received information on scheduling this appointment within the next week.

## 2021-02-18 NOTE — PROGRESS NOTES
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 male patient of Dr. Serrato.  I am doing a telehealth video visit with patient today and I have reviewed his records.     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     In  patient was treated for non-Hodgkin's lymphoma with chemotherapy and radiation therapy.  He has been followed by Dr. Mittal locally and is felt to be cured.  He developed bilateral upper extremity DVT and subclavian vein stenosis and percutaneous revascularization was attempted several times.  However this was unsuccessful and he developed subclavian vein syndrome.  He was seen by [facility] 2018 and had a surgical vascular shunt placed from the left internal jugular vein to the right atrium.  He was placed on Lovenox and aspirin with plans to transition to Coumadin.  However due to patient's work schedule he had not been able to return for this.  He developed pleural effusions and pericardial effusion postoperatively which improved with nonsteroidals.  Follow-up echo 2018 showed low normal systolic function with EF of 45 to 50%, though by Definity ejection fraction was normal with small pericardial effusion and no evidence of pericardial constraint.  Trivial mitral regurgitation was seen.  Follow-up CT scan 2018 showed graft with a mass in the superior mediastinum that was unchanged.  Right upper lobe nodule was felt to be stable. Repeat echo 2019 showed EF estimated at 49%.  Treadmill stress test was recommended and  ordered but patient did not have this done.  He has missed last few appointments. He reported a CT scan that showed partial obstruction of left IJ/brachicephalic venous stent.  He called [facility] and reports that they were not concerned by this and that they were not necessarily surprised by borderline low EF but did agree with stress test to ensure not ischemic related.      Patient presents to office today for follow-up appointment.  Patient reports he is feeling well since last visit.  No new issues.  Has been getting back into more regular physical activity-- has been doing at least 30 minutes of cardio on the treadmill alternating between quick walking and jogging daily.  Stamina and energy levels improved with exercise.  BP has been running around 120/70 at PCP office.  Has been able to lose weight on purpose.  Denies chest pain/ discomfort, shortness of breath, shortness of breath with exertion, palpitations, racing heartbeat sensation, edema, dizziness, syncope, near syncope, falls, fatigue, or abnormal bleeding.        Past Medical History:   Diagnosis Date   • Allergic    • Anemia    • Anxiety    • Atelectasis    • Chest pain    • Cough    • Current use of long term anticoagulation    • Deep vein thrombosis (CMS/HCC)     arms   • Diffuse large B cell lymphoma (CMS/HCC)     in remission s/p chemo and radiation   • GERD (gastroesophageal reflux disease)    • History of radiation therapy     to chest and neck   • Hyperhidrosis    • Hypothyroidism    • Insomnia    • Low testosterone in male    • Lymphoma (CMS/HCC)     lymphoma   • Pericardial effusion    • Pericarditis    • Superior vena cava syndrome     s/p vena cava reconstruction surgery   • TIA (transient ischemic attack)      Past Surgical History:   Procedure Laterality Date   • BRACHIOCEPHALIC VEIN ANGIOPLASTY / STENTING Bilateral     no stents were able to be placed   • BYPASS GRAFT  05/17/2018    Heart surgery bypass; Left IJ to right atrium   •  CHEST TUBE INSERTION Right     after thoracentesis   • CORONARY ARTERY BYPASS GRAFT  2018   • EXCISION MASS HEAD/NECK Left 2/27/2017    Procedure: Excisional biopsy of left occipital lymph node;  Surgeon: Catalino Damon MD;  Location: [facility];  Service:    • LYMPH NODE BIOPSY     • OTHER SURGICAL HISTORY      SVC reconstruction/graft   • RIB BIOPSY Right 2011    MEDIASTINUM   • SKIN BIOPSY      cyst on left shoulder, benign   • THORACENTESIS Right     thora drain left in for 6 months     Outpatient Medications Prior to Visit   Medication Sig Dispense Refill   • aspirin 81 MG tablet Take 1 tablet by mouth Daily. 90 tablet 3   • Dust Mite Mixed Allergen Ext (ODACTRA SL) Place  under the tongue Daily.     • Omega-3 Fatty Acids (FISH OIL) 1000 MG capsule capsule Take 2,000 mg by mouth Daily With Breakfast.     • Psyllium (METAMUCIL FIBER PO) Take  by mouth Daily.     • Synthroid 88 MCG tablet Take 1 tablet by mouth Daily. 30 tablet 5   • warfarin (COUMADIN) 5 MG tablet Take one and one-half tablets (7.5 mg) by mouth on Mon and Thurs, and take one tablet (5 mg) by mouth all other days or as directed. 105 tablet 1     No facility-administered medications prior to visit.        Allergies as of 02/18/2021 - Reviewed 02/18/2021   Allergen Reaction Noted   • Zoloft [sertraline hcl] Mental Status Change 09/29/2017   • Contrast dye Other (See Comments) 05/31/2018     Social History     Socioeconomic History   • Marital status: Single     Spouse name: Not on file   • Number of children: Not on file   • Years of education: College   • Highest education level: Not on file   Occupational History   • Occupation: Physical therapy tech     Employer: [employer]   Tobacco Use   • Smoking status: Never Smoker   • Smokeless tobacco: Never Used   • Tobacco comment: Daily caffeine use   Substance and Sexual Activity   • Alcohol use: Yes     Comment: 1-2/week   • Drug use: No   • Sexual activity: Defer      "Partners: Male   Social History Narrative    Lives at home with his dog.  Works as a Physical Therapist.       Family History   Problem Relation Age of Onset   • Cancer Maternal Grandfather         Prostate and throat cancer   • Cancer Paternal Grandmother         Colon Cancer   • Diabetes Paternal Grandmother    • Asthma Paternal Grandmother    • Stroke Paternal Grandmother    • Hypertension Paternal Grandmother    • Aneurysm Paternal Grandmother    • Cancer Paternal Grandfather         Lung cancer   • COPD Paternal Grandfather    • Diabetes Father    • Sudden death Maternal Grandmother    • Cancer Maternal Grandmother         Breast Cancer         Review of Systems   Constitution: Negative for malaise/fatigue.   Cardiovascular: Negative for chest pain, dyspnea on exertion, leg swelling, near-syncope, palpitations and syncope.        SEE HPI   Respiratory: Negative for shortness of breath.    Hematologic/Lymphatic: Negative for bleeding problem.   Musculoskeletal: Negative for falls.   Gastrointestinal: Negative for melena.   Genitourinary: Negative for hematuria.   Neurological: Negative for dizziness.   Psychiatric/Behavioral: Negative for altered mental status.            Objective:     Vitals:    02/18/21 0955   Weight: 93 kg (205 lb)   Height: 180.3 cm (71\")     Body mass index is 28.59 kg/m².        PHYSICAL EXAM:  Alert and oriented x 4.  No acute distress.  Sitting comfortably.   Respirations appear even, unlabored, normal rate.        Assessment:       Diagnosis Plan   1. Abnormal echocardiogram  Adult Transthoracic Echo Complete w/ Color, Spectral and Contrast if Necessary Per Protocol   2. Current use of long term anticoagulation     3. History of pericarditis     4. Occlusion of subclavian vein, unspecified laterality (CMS/HCC)  Ambulatory Referral to Vascular Surgery         Plan:     1. Borderline low EF: Patient never did stress echo as previously discussed.  He is currently asymptomatic and is " exercising at a good level.  He would like to start with repeat echocardiogram and if EF is still low or borderline low then he would be willing to come back for treadmill stress test.  2. Subclavian vein obstruction s/p left IJ to right atrial revascularization: On chronic anticoagulation therapy with warfarin.  Denies falls or abnormal bleeding.  Follows with INR clinic.  Reports he was previously told by [place] that INR goal is 2-3.  He is not currently following with [facility] and needs a referral to a local vascular surgeon to continue to monitor this.  3. History of pericardial effusion: Resolved with NSAIDs.  Clinically stable without any chest pain or shortness of breath symptoms.  4. History of bilateral upper extremity DVT  5. Hypothyroidism: PCP managing.  On replacement.  6. History of non-Hodgkin's lymphoma with chest wall radiation    Plan of care reviewed with Dr. Serrato.    Patient to follow-up in the office with Dr. Serrato in 6 months or sooner if needed for any new, recurrent, or worsening symptoms or other issues or concerns.  Discussed importance of regular office follow-ups and next visit really needs to be in the office if possible.  Patient verbalized understanding.  He will call sooner for issues or concerns.        This patient has consented to a telehealth visit via video. The visit was scheduled as a telehealth video visit to comply with patient safety concerns in accordance with CDC recommendations.  All vitals recorded within this visit are reported by the patient.  I spent 30 minutes in total including but not limited to the 15 minutes spent in direct conversation with this patient.                Your medication list          Accurate as of February 18, 2021  3:46 PM. If you have any questions, ask your nurse or doctor.            CONTINUE taking these medications      Instructions Last Dose Given Next Dose Due   aspirin 81 MG tablet      Take 1 tablet by mouth Daily.       fish oil 1000 MG  capsule capsule      Take 2,000 mg by mouth Daily With Breakfast.       METAMUCIL FIBER PO      Take  by mouth Daily.       ODACTRA SL      Place  under the tongue Daily.       Synthroid 88 MCG tablet  Generic drug: levothyroxine      Take 1 tablet by mouth Daily.       warfarin 5 MG tablet  Commonly known as: COUMADIN      Take one and one-half tablets (7.5 mg) by mouth on Mon and Thurs, and take one tablet (5 mg) by mouth all other days or as directed.              The above medication changes may not have been made by this provider.  Patient's medication list was updated to reflect medications they are currently taking including medication changes made by other providers.            Thanks,    Jacqui Hagan, DNP, APRN  02/18/2021         Dictated utilizing Dragon dictation

## 2021-02-26 ENCOUNTER — TELEPHONE (OUTPATIENT)
Dept: PHARMACY | Facility: HOSPITAL | Age: 36
End: 2021-02-26

## 2021-03-04 ENCOUNTER — TELEPHONE (OUTPATIENT)
Dept: PHARMACY | Facility: HOSPITAL | Age: 36
End: 2021-03-04

## 2021-03-05 ENCOUNTER — HOSPITAL ENCOUNTER (OUTPATIENT)
Dept: CARDIOLOGY | Facility: HOSPITAL | Age: 36
Discharge: HOME | End: 2021-03-05
Admitting: NURSE PRACTITIONER

## 2021-03-05 PROCEDURE — 93306 TTE W/DOPPLER COMPLETE: CPT | Performed by: INTERNAL MEDICINE

## 2021-03-05 PROCEDURE — 93356 MYOCRD STRAIN IMG SPCKL TRCK: CPT

## 2021-03-05 PROCEDURE — 93356 MYOCRD STRAIN IMG SPCKL TRCK: CPT | Performed by: INTERNAL MEDICINE

## 2021-03-05 PROCEDURE — 93306 TTE W/DOPPLER COMPLETE: CPT

## 2021-03-08 LAB
AORTIC ARCH: 1.7 CM
ASCENDING AORTA: 2.7 CM
BH CV ECHO MEAS - ACS: 2.4 CM
BH CV ECHO MEAS - AO MAX PG (FULL): 0.64 MMHG
BH CV ECHO MEAS - AO MAX PG: 3.5 MMHG
BH CV ECHO MEAS - AO MEAN PG (FULL): 0.58 MMHG
BH CV ECHO MEAS - AO MEAN PG: 1.9 MMHG
BH CV ECHO MEAS - AO ROOT AREA (BSA CORRECTED): 1.5
BH CV ECHO MEAS - AO ROOT AREA: 7.6 CM^2
BH CV ECHO MEAS - AO ROOT DIAM: 3.1 CM
BH CV ECHO MEAS - AO V2 MAX: 93.1 CM/SEC
BH CV ECHO MEAS - AO V2 MEAN: 65 CM/SEC
BH CV ECHO MEAS - AO V2 VTI: 19.2 CM
BH CV ECHO MEAS - ASC AORTA: 2.7 CM
BH CV ECHO MEAS - AVA(I,A): 2.9 CM^2
BH CV ECHO MEAS - AVA(I,D): 2.9 CM^2
BH CV ECHO MEAS - AVA(V,A): 3.6 CM^2
BH CV ECHO MEAS - AVA(V,D): 3.6 CM^2
BH CV ECHO MEAS - BSA(HAYCOCK): 2.2 M^2
BH CV ECHO MEAS - BSA: 2.1 M^2
BH CV ECHO MEAS - BZI_BMI: 28.6 KILOGRAMS/M^2
BH CV ECHO MEAS - BZI_METRIC_HEIGHT: 180.3 CM
BH CV ECHO MEAS - BZI_METRIC_WEIGHT: 93 KG
BH CV ECHO MEAS - EDV(MOD-SP2): 141 ML
BH CV ECHO MEAS - EDV(MOD-SP4): 106 ML
BH CV ECHO MEAS - EDV(TEICH): 149.1 ML
BH CV ECHO MEAS - EF(CUBED): 67.2 %
BH CV ECHO MEAS - EF(MOD-BP): 50 %
BH CV ECHO MEAS - EF(MOD-SP2): 53.2 %
BH CV ECHO MEAS - EF(MOD-SP4): 46.2 %
BH CV ECHO MEAS - EF(TEICH): 58.1 %
BH CV ECHO MEAS - ESV(MOD-SP2): 66 ML
BH CV ECHO MEAS - ESV(MOD-SP4): 57 ML
BH CV ECHO MEAS - ESV(TEICH): 62.5 ML
BH CV ECHO MEAS - FS: 31 %
BH CV ECHO MEAS - IVS/LVPW: 1
BH CV ECHO MEAS - IVSD: 0.9 CM
BH CV ECHO MEAS - LAT PEAK E' VEL: 16.6 CM/SEC
BH CV ECHO MEAS - LV DIASTOLIC VOL/BSA (35-75): 49.7 ML/M^2
BH CV ECHO MEAS - LV MASS(C)D: 183.2 GRAMS
BH CV ECHO MEAS - LV MASS(C)DI: 86 GRAMS/M^2
BH CV ECHO MEAS - LV MAX PG: 2.8 MMHG
BH CV ECHO MEAS - LV MEAN PG: 1.3 MMHG
BH CV ECHO MEAS - LV SYSTOLIC VOL/BSA (12-30): 26.7 ML/M^2
BH CV ECHO MEAS - LV V1 MAX: 84 CM/SEC
BH CV ECHO MEAS - LV V1 MEAN: 52.4 CM/SEC
BH CV ECHO MEAS - LV V1 VTI: 13.7 CM
BH CV ECHO MEAS - LVIDD: 5.5 CM
BH CV ECHO MEAS - LVIDS: 3.8 CM
BH CV ECHO MEAS - LVLD AP2: 8.9 CM
BH CV ECHO MEAS - LVLD AP4: 8.5 CM
BH CV ECHO MEAS - LVLS AP2: 7.6 CM
BH CV ECHO MEAS - LVLS AP4: 7.3 CM
BH CV ECHO MEAS - LVOT AREA (M): 4.2 CM^2
BH CV ECHO MEAS - LVOT AREA: 4 CM^2
BH CV ECHO MEAS - LVOT DIAM: 2.3 CM
BH CV ECHO MEAS - LVPWD: 0.87 CM
BH CV ECHO MEAS - MED PEAK E' VEL: 8.3 CM/SEC
BH CV ECHO MEAS - MV A DUR: 0.1 SEC
BH CV ECHO MEAS - MV A MAX VEL: 48.4 CM/SEC
BH CV ECHO MEAS - MV DEC TIME: 0.19 SEC
BH CV ECHO MEAS - MV E MAX VEL: 66 CM/SEC
BH CV ECHO MEAS - MV E/A: 1.4
BH CV ECHO MEAS - MV MAX PG: 1.9 MMHG
BH CV ECHO MEAS - MV MEAN PG: 0.96 MMHG
BH CV ECHO MEAS - MV P1/2T MAX VEL: 69 CM/SEC
BH CV ECHO MEAS - MV V2 MAX: 68.3 CM/SEC
BH CV ECHO MEAS - MV V2 MEAN: 46.5 CM/SEC
BH CV ECHO MEAS - MV V2 VTI: 19.4 CM
BH CV ECHO MEAS - MVA P1/2T LCG: 3.2 CM^2
BH CV ECHO MEAS - MVA(VTI): 2.8 CM^2
BH CV ECHO MEAS - PA ACC TIME: 0.13 SEC
BH CV ECHO MEAS - PA MAX PG (FULL): 3.1 MMHG
BH CV ECHO MEAS - PA MAX PG: 3.9 MMHG
BH CV ECHO MEAS - PA PR(ACCEL): 22 MMHG
BH CV ECHO MEAS - PA V2 MAX: 99.1 CM/SEC
BH CV ECHO MEAS - PI END-D VEL: 115.4 CM/SEC
BH CV ECHO MEAS - PULM A REVS DUR: 0.11 SEC
BH CV ECHO MEAS - PULM A REVS VEL: 28.5 CM/SEC
BH CV ECHO MEAS - PULM DIAS VEL: 81.3 CM/SEC
BH CV ECHO MEAS - PULM S/D: 0.73
BH CV ECHO MEAS - PULM SYS VEL: 59.2 CM/SEC
BH CV ECHO MEAS - PVA(V,A): 1.2 CM^2
BH CV ECHO MEAS - PVA(V,D): 1.2 CM^2
BH CV ECHO MEAS - QP/QS: 0.46
BH CV ECHO MEAS - RV MAX PG: 0.83 MMHG
BH CV ECHO MEAS - RV MEAN PG: 0.46 MMHG
BH CV ECHO MEAS - RV V1 MAX: 45.4 CM/SEC
BH CV ECHO MEAS - RV V1 MEAN: 31.5 CM/SEC
BH CV ECHO MEAS - RV V1 VTI: 9.9 CM
BH CV ECHO MEAS - RVOT AREA: 2.6 CM^2
BH CV ECHO MEAS - RVOT DIAM: 1.8 CM
BH CV ECHO MEAS - SI(AO): 68.7 ML/M^2
BH CV ECHO MEAS - SI(CUBED): 53.2 ML/M^2
BH CV ECHO MEAS - SI(LVOT): 26 ML/M^2
BH CV ECHO MEAS - SI(MOD-SP2): 35.2 ML/M^2
BH CV ECHO MEAS - SI(MOD-SP4): 23 ML/M^2
BH CV ECHO MEAS - SI(TEICH): 40.7 ML/M^2
BH CV ECHO MEAS - SV(AO): 146.4 ML
BH CV ECHO MEAS - SV(CUBED): 113.4 ML
BH CV ECHO MEAS - SV(LVOT): 55.3 ML
BH CV ECHO MEAS - SV(MOD-SP2): 75 ML
BH CV ECHO MEAS - SV(MOD-SP4): 49 ML
BH CV ECHO MEAS - SV(RVOT): 25.4 ML
BH CV ECHO MEAS - SV(TEICH): 86.6 ML
BH CV ECHO MEAS - TAPSE (>1.6): 2.3 CM
BH CV ECHO MEASUREMENTS AVERAGE E/E' RATIO: 5.3
BH CV XLRA - RV BASE: 3.7 CM
BH CV XLRA - RV LENGTH: 7.1 CM
BH CV XLRA - RV MID: 2.7 CM
BH CV XLRA - TDI S': 8.5 CM/SEC
LEFT ATRIUM VOLUME INDEX: 25 ML/M2
MAXIMAL PREDICTED HEART RATE: 184 BPM
SINUS: 3.2 CM
STJ: 2.8 CM
STRESS TARGET HR: 156 BPM

## 2021-03-09 ENCOUNTER — TELEPHONE (OUTPATIENT)
Dept: CARDIOLOGY | Facility: CLINIC | Age: 36
End: 2021-03-09

## 2021-03-09 DIAGNOSIS — I42.8 OTHER CARDIOMYOPATHY (HCC): ICD-10-CM

## 2021-03-09 DIAGNOSIS — R93.1 ABNORMAL ECHOCARDIOGRAM: Primary | ICD-10-CM

## 2021-03-09 NOTE — TELEPHONE ENCOUNTER
3/9/21  [place] left [facility] [place] - returning Jacqui's call.  Ph [phone]/jj

## 2021-03-09 NOTE — TELEPHONE ENCOUNTER
Follow-up echo shows EF remains borderline low with abnormal global longitudinal LV strain.  Moderately dilated LV cavity also seen with global hypokinesis.  No significant valvular issues.    Would like to proceed with treadmill stress test to rule out evidence of ischemia.  Would also like to discuss starting metoprolol XL 25 mg daily to help with EF.  If blood pressure tolerates this could also look at starting low-dose ACE inhibitor however during recent visit patient reported normal blood pressures and not sure that he would be able to tolerate both.    ---------------------------------------      Called to discuss with patient but no answer.  Left a voicemail asking him to call the office back.

## 2021-03-10 RX ORDER — METOPROLOL SUCCINATE 25 MG/1
25 TABLET, EXTENDED RELEASE ORAL DAILY
Qty: 30 TABLET | Refills: 1 | Status: SHIPPED | OUTPATIENT
Start: 2021-03-10 | End: 2021-07-07

## 2021-03-10 NOTE — TELEPHONE ENCOUNTER
Called and discussed echo results with patient.  EF still borderline low.  Dr. Serrato had recommended checking stress test if it remained low.  We will go ahead and get a treadmill stress test scheduled.  He is running 3 miles a day in the park and denies any exertional symptoms or concerns but given his history I do think that we should check this just to make sure no significant EKG changes to indicate ischemia.  Also discussed starting metoprolol XL to help with the pumping function of the heart.  He will take this at night if it causes fatigue.  He will call for systolic blood pressure less than 100 or heart rate less than 50.  If he tolerates this could look at possibly increasing dose versus adding low-dose ACE inhibitor based on heart rate and blood pressure readings.  Patient on board with plan.  He denies any heart failure symptoms will call if he develops these, discussed in detail.

## 2021-04-09 ENCOUNTER — ANTICOAGULATION VISIT (OUTPATIENT)
Dept: PHARMACY | Facility: HOSPITAL | Age: 36
End: 2021-04-09

## 2021-04-09 DIAGNOSIS — Z79.01 ANTICOAGULATED ON COUMADIN: ICD-10-CM

## 2021-04-09 LAB
INR PPP: 2.1 (ref 0.91–1.09)
PROTHROMBIN TIME: 24.7 SECONDS (ref 10–13.8)

## 2021-04-09 PROCEDURE — 85610 PROTHROMBIN TIME: CPT

## 2021-04-09 PROCEDURE — 36416 COLLJ CAPILLARY BLOOD SPEC: CPT

## 2021-04-09 NOTE — PROGRESS NOTES
Anticoagulation Clinic Progress Note    Anticoagulation Summary  As of 2021    INR goal:  2.0-3.0   TTR:  50.4 % (2.5 y)   INR used for dosin.1 (2021)   Warfarin maintenance plan:  7.5 mg every Mon, Thu; 5 mg all other days   Weekly warfarin total:  40 mg   No change documented:  Maame Santos   Plan last modified:  [facility] (10/9/2020)   Next INR check:  2021   Priority:  Maintenance   Target end date:  Indefinite    Indications    Anticoagulated on Coumadin [Z79.01]  DVT (deep venous thrombosis) (CMS/[facility]) [I82.409] [I82.409]             Anticoagulation Episode Summary     INR check location:      Preferred lab:      Send INR reminders to:  [facility]    Comments:        Anticoagulation Care Providers     Provider Role Specialty Phone number    Lynda Serrato MD Referring Cardiology [phone]          Clinic Interview:  Patient Findings     Negatives:  Signs/symptoms of thrombosis, Signs/symptoms of bleeding,   Laboratory test error suspected, Change in health, Change in alcohol use,   Change in activity, Upcoming invasive procedure, Emergency department   visit, Upcoming dental procedure, Missed doses, Extra doses, Change in   medications, Change in diet/appetite, Hospital admission, Bruising, Other   complaints      Clinical Outcomes     Negatives:  Major bleeding event, Thromboembolic event,   Anticoagulation-related hospital admission, Anticoagulation-related ED   visit, Anticoagulation-related fatality        INR History:  Anticoagulation Monitoring 2021   INR 1.8 2.3 2.1   INR Date 2021   INR Goal 2.0-3.0 2.0-3.0 2.0-3.0   Trend Same Same Same   Last Week Total 40 mg 40 mg 40 mg   Next Week Total 40 mg 40 mg 40 mg   Sun 5 mg 5 mg 5 mg   Mon 7.5 mg 7.5 mg 7.5 mg   Tue 5 mg 5 mg 5 mg   Wed 5 mg 5 mg 5 mg   Thu 7.5 mg 7.5 mg 7.5 mg   Fri 5 mg 5 mg 5 mg   Sat 5 mg 5 mg 5 mg   Visit Report - - -   Some recent data might  be hidden       Plan:  1. INR is therapeutic today- see above in Anticoagulation Summary.   Will instruct Scott Allen to continue their warfarin regimen- see above in Anticoagulation Summary.  2. Follow up in 4 weeks.  3. Patient declines warfarin refills.  4. Verbal and written information provided. Patient expresses understanding and has no further questions at this time.    Maame Santos

## 2021-04-16 ENCOUNTER — BULK ORDERING (OUTPATIENT)
Dept: CASE MANAGEMENT | Facility: OTHER | Age: 36
End: 2021-04-16

## 2021-04-16 DIAGNOSIS — Z23 IMMUNIZATION DUE: ICD-10-CM

## 2021-05-25 LAB
ALBUMIN SERPL-MCNC: 4.8 G/DL (ref 3.5–5.2)
ALBUMIN/GLOB SERPL: 1.8 G/DL
ALP SERPL-CCNC: 43 U/L (ref 39–117)
ALT SERPL W P-5'-P-CCNC: 23 U/L (ref 1–41)
ANION GAP SERPL CALCULATED.3IONS-SCNC: 9.8 MMOL/L (ref 5–15)
AST SERPL-CCNC: 21 U/L (ref 1–40)
BASOPHILS # BLD AUTO: 0.07 10*3/MM3 (ref 0–0.2)
BASOPHILS NFR BLD AUTO: 1.3 % (ref 0–1.5)
BILIRUB SERPL-MCNC: 0.9 MG/DL (ref 0–1.2)
BILIRUB UR QL STRIP: NEGATIVE
BUN SERPL-MCNC: 10 MG/DL (ref 6–20)
BUN/CREAT SERPL: 11.8 (ref 7–25)
CALCIUM SPEC-SCNC: 9.5 MG/DL (ref 8.6–10.5)
CHLORIDE SERPL-SCNC: 101 MMOL/L (ref 98–107)
CLARITY UR: CLEAR
CO2 SERPL-SCNC: 27.2 MMOL/L (ref 22–29)
COLOR UR: YELLOW
CREAT SERPL-MCNC: 0.85 MG/DL (ref 0.76–1.27)
DEPRECATED RDW RBC AUTO: 43.8 FL (ref 37–54)
EOSINOPHIL # BLD AUTO: 0.19 10*3/MM3 (ref 0–0.4)
EOSINOPHIL NFR BLD AUTO: 3.5 % (ref 0.3–6.2)
ERYTHROCYTE [DISTWIDTH] IN BLOOD BY AUTOMATED COUNT: 14.3 % (ref 12.3–15.4)
GFR SERPL CREATININE-BSD FRML MDRD: 102 ML/MIN/1.73
GLOBULIN UR ELPH-MCNC: 2.6 GM/DL
GLUCOSE SERPL-MCNC: 95 MG/DL (ref 65–99)
GLUCOSE UR STRIP-MCNC: NEGATIVE MG/DL
HCT VFR BLD AUTO: 44.9 % (ref 37.5–51)
HGB BLD-MCNC: 15.3 G/DL (ref 13–17.7)
HGB UR QL STRIP.AUTO: NEGATIVE
IMM GRANULOCYTES # BLD AUTO: 0.02 10*3/MM3 (ref 0–0.05)
IMM GRANULOCYTES NFR BLD AUTO: 0.4 % (ref 0–0.5)
KETONES UR QL STRIP: NEGATIVE
LEUKOCYTE ESTERASE UR QL STRIP.AUTO: NEGATIVE
LYMPHOCYTES # BLD AUTO: 1.4 10*3/MM3 (ref 0.7–3.1)
LYMPHOCYTES NFR BLD AUTO: 25.5 % (ref 19.6–45.3)
MCH RBC QN AUTO: 28.8 PG (ref 26.6–33)
MCHC RBC AUTO-ENTMCNC: 34.1 G/DL (ref 31.5–35.7)
MCV RBC AUTO: 84.4 FL (ref 79–97)
MONOCYTES # BLD AUTO: 0.36 10*3/MM3 (ref 0.1–0.9)
MONOCYTES NFR BLD AUTO: 6.6 % (ref 5–12)
NEUTROPHILS NFR BLD AUTO: 3.44 10*3/MM3 (ref 1.7–7)
NEUTROPHILS NFR BLD AUTO: 62.7 % (ref 42.7–76)
NITRITE UR QL STRIP: NEGATIVE
NRBC BLD AUTO-RTO: 0 /100 WBC (ref 0–0.2)
PH UR STRIP.AUTO: 6 [PH] (ref 5–8)
PLATELET # BLD AUTO: 197 10*3/MM3 (ref 140–450)
PMV BLD AUTO: 9.6 FL (ref 6–12)
POTASSIUM SERPL-SCNC: 3.6 MMOL/L (ref 3.5–5.2)
PROT SERPL-MCNC: 7.4 G/DL (ref 6–8.5)
PROT UR QL STRIP: ABNORMAL
RBC # BLD AUTO: 5.32 10*6/MM3 (ref 4.14–5.8)
SODIUM SERPL-SCNC: 138 MMOL/L (ref 136–145)
SP GR UR STRIP: 1.02 (ref 1–1.03)
UROBILINOGEN UR QL STRIP: ABNORMAL
WBC # BLD AUTO: 5.48 10*3/MM3 (ref 3.4–10.8)

## 2021-05-25 PROCEDURE — 82550 ASSAY OF CK (CPK): CPT

## 2021-05-25 PROCEDURE — 81003 URINALYSIS AUTO W/O SCOPE: CPT

## 2021-05-25 PROCEDURE — 80053 COMPREHEN METABOLIC PANEL: CPT

## 2021-05-25 PROCEDURE — 85025 COMPLETE CBC W/AUTO DIFF WBC: CPT

## 2021-05-25 PROCEDURE — 85610 PROTHROMBIN TIME: CPT

## 2021-05-25 PROCEDURE — 99283 EMERGENCY DEPT VISIT LOW MDM: CPT

## 2021-05-25 RX ORDER — SODIUM CHLORIDE 0.9 % (FLUSH) 0.9 %
10 SYRINGE (ML) INJECTION AS NEEDED
Status: DISCONTINUED | OUTPATIENT
Start: 2021-05-25 | End: 2021-05-26 | Stop reason: HOSPADM

## 2021-05-26 ENCOUNTER — HOSPITAL ENCOUNTER (EMERGENCY)
Facility: HOSPITAL | Age: 36
Discharge: HOME | End: 2021-05-26
Attending: EMERGENCY MEDICINE | Admitting: EMERGENCY MEDICINE

## 2021-05-26 VITALS
SYSTOLIC BLOOD PRESSURE: 120 MMHG | BODY MASS INDEX: 27.44 KG/M2 | WEIGHT: 196 LBS | OXYGEN SATURATION: 95 % | HEART RATE: 92 BPM | RESPIRATION RATE: 16 BRPM | TEMPERATURE: 97.8 F | DIASTOLIC BLOOD PRESSURE: 75 MMHG | HEIGHT: 71 IN

## 2021-05-26 DIAGNOSIS — R58 ECCHYMOSIS: ICD-10-CM

## 2021-05-26 DIAGNOSIS — R60.0 EDEMA OF UPPER EXTREMITY: Primary | ICD-10-CM

## 2021-05-26 DIAGNOSIS — R82.998 DARK URINE: ICD-10-CM

## 2021-05-26 LAB
CK SERPL-CCNC: 211 U/L (ref 20–200)
HOLD SPECIMEN: NORMAL
HOLD SPECIMEN: NORMAL
INR PPP: 1.23 (ref 0.9–1.1)
PROTHROMBIN TIME: 15.3 SECONDS (ref 11.7–14.2)
WHOLE BLOOD HOLD SPECIMEN: NORMAL
WHOLE BLOOD HOLD SPECIMEN: NORMAL

## 2021-05-26 NOTE — ED NOTES
"Pt arrives via PV with c/o chest bruising and edema in both arms, dark urine. Recently started working out \"pretty hard\". Takes blood thinners for \"articfical vein in my neck\". Pt a&ox4, abc's intact, NAD noted, ambulatory to triage.     Patient wearing mask during triage. RN wearing appropriate PPE during triage. Hand hygiene performed.       Rubi Miguel, RN  05/25/21 [id number]    "

## 2021-05-26 NOTE — ED PROVIDER NOTES
EMERGENCY DEPARTMENT ENCOUNTER    Room Number:  03/03  Date of encounter:  5/26/2021  PCP: Chel Ayala APRN  Historian: Patient      HPI:  Chief Complaint: Dark-colored urine, upper extremity edema and soreness.  A complete HPI/ROS/PMH/PSH/SH/FH are unobtainable due to: Nothing    Context: Scott Allen is a 36 y.o. male who presents to the ED c/o dark-colored urine, upper remedy edema and soreness status post working out.  Patient states he has been a long time without working out and jumped back into it heavily 5 days ago.  Since the time of his workout he gradually developed the symptoms above.  He states he has had multiple friends get rhabdomyolysis in the past.  He is here for further evaluation to ensure that he has not developed rhabdomyolysis.  He denies chest pain, palpitations, fever, chills, nausea, vomiting, skin rash associated with his symptoms.  He is not on any antiplatelet or anticoagulant therapy.      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   • Malignant lymphoma, large cell, diffuse (CMS/HCC) 02/26/2017   • Balance problem 08/21/2017   • Neuropathy 08/21/2017   • Blurry vision, bilateral 09/11/2017   • Generalized anxiety disorder 09/11/2017   • Seasonal allergic rhinitis 09/14/2017   • Hypothyroidism 10/12/2017   • Cold intolerance 10/12/2017   • Chronic fatigue syndrome 10/12/2017   • Primary insomnia 10/13/2017   • Anemia 08/02/2018   • Hyperhidrosis 08/02/2018   • Anxiety 08/02/2018   • History of pericarditis 08/02/2018   • Low testosterone in male    • Current use of long term anticoagulation    • Persistent insomnia 10/13/2017   • Other abnormalities of gait and mobility 08/21/2017   • Visual disturbance 09/11/2017   • History of radiation therapy    • Snoring 09/23/2018   • Daytime somnolence 09/23/2018   • Palpitation 09/23/2018   • Myocardiopathy (CMS/HCC) 09/23/2018   • Anticoagulated on Coumadin  10/15/2018   • DVT (deep venous thrombosis) (CMS/HCC) [I82.409] 10/23/2018   • Subclavian vein obstruction, unspecified laterality (CMS/HCC) 02/18/2021     Resolved Ambulatory Problems     Diagnosis Date Noted   • No Resolved Ambulatory Problems     Past Medical History:   Diagnosis Date   • Allergic    • Atelectasis    • Chest pain    • Cough    • Deep vein thrombosis (CMS/HCC)    • Diffuse large B cell lymphoma (CMS/HCC)    • GERD (gastroesophageal reflux disease)    • Insomnia    • Lymphoma (CMS/HCC)    • Pericardial effusion    • Pericarditis    • TIA (transient ischemic attack)          PAST SURGICAL HISTORY  Past Surgical History:   Procedure Laterality Date   • BRACHIOCEPHALIC VEIN ANGIOPLASTY / STENTING Bilateral     no stents were able to be placed   • BYPASS GRAFT  05/17/2018    Heart surgery bypass; Left IJ to right atrium   • CHEST TUBE INSERTION Right     after thoracentesis   • CORONARY ARTERY BYPASS GRAFT  2018   • EXCISION MASS HEAD/NECK Left 2/27/2017    Procedure: Excisional biopsy of left occipital lymph node;  Surgeon: Catalino Damon MD;  Location: [facility];  Service:    • LYMPH NODE BIOPSY     • OTHER SURGICAL HISTORY      SVC reconstruction/graft   • RIB BIOPSY Right 2011    MEDIASTINUM   • SKIN BIOPSY      cyst on left shoulder, benign   • THORACENTESIS Right     thora drain left in for 6 months         FAMILY HISTORY  Family History   Problem Relation Age of Onset   • Cancer Maternal Grandfather         Prostate and throat cancer   • Cancer Paternal Grandmother         Colon Cancer   • Diabetes Paternal Grandmother    • Asthma Paternal Grandmother    • Stroke Paternal Grandmother    • Hypertension Paternal Grandmother    • Aneurysm Paternal Grandmother    • Cancer Paternal Grandfather         Lung cancer   • COPD Paternal Grandfather    • Diabetes Father    • Sudden death Maternal Grandmother    • Cancer Maternal Grandmother         Breast Cancer         SOCIAL HISTORY  Social  History     Socioeconomic History   • Marital status: Single     Spouse name: Not on file   • Number of children: Not on file   • Years of education: College   • Highest education level: Not on file   Tobacco Use   • Smoking status: Never Smoker   • Smokeless tobacco: Never Used   • Tobacco comment: Daily caffeine use   Substance and Sexual Activity   • Alcohol use: Yes     Comment: 1-2/week   • Drug use: No   • Sexual activity: Defer     Partners: Male         ALLERGIES  Zoloft [sertraline hcl] and Contrast dye        REVIEW OF SYSTEMS  Review of Systems   Constitutional: Positive for fever. Negative for chills.   HENT: Negative.    Eyes: Negative.    Respiratory: Negative.    Cardiovascular: Negative.    Gastrointestinal: Negative for abdominal pain, diarrhea, nausea and vomiting.   Genitourinary: Negative for dysuria.        Dark-colored urine   Musculoskeletal: Negative.    Skin: Negative.    Neurological: Negative.    Hematological: Bruises/bleeds easily.        Upper extremities from working out   Psychiatric/Behavioral: Negative.         All systems reviewed and negative except for those discussed in HPI.       PHYSICAL EXAM    I have reviewed the triage vital signs and nursing notes.    ED Triage Vitals   Temp Heart Rate Resp BP SpO2   05/25/21 2305 05/25/21 2305 05/25/21 2305 05/25/21 2318 05/25/21 2305   97.8 °F (36.6 °C) 101 16 120/75 95 %      Temp src Heart Rate Source Patient Position BP Location FiO2 (%)   05/25/21 2305 05/25/21 2305 05/25/21 2318 05/25/21 2318 --   Tympanic Monitor Sitting Left arm        Physical Exam  GENERAL: WDWN male in no acute distress  HENT: nares patent mucous nares moist, and atraumatic  EYES: no scleral icterus  CV: regular rhythm, regular rate, no rubs murmurs or gallops  RESPIRATORY: normal effort, lungs CTA B  ABDOMEN: soft  MUSCULOSKELETAL: no deformity  NEURO: alert, moves all extremities, follows commands  SKIN: warm, dry        LAB RESULTS  Recent Results (from the  past 24 hour(s))   Comprehensive Metabolic Panel    Collection Time: 05/25/21 11:16 PM    Specimen: Blood   Result Value Ref Range    Glucose 95 65 - 99 mg/dL    BUN 10 6 - 20 mg/dL    Creatinine 0.85 0.76 - 1.27 mg/dL    Sodium 138 136 - 145 mmol/L    Potassium 3.6 3.5 - 5.2 mmol/L    Chloride 101 98 - 107 mmol/L    CO2 27.2 22.0 - 29.0 mmol/L    Calcium 9.5 8.6 - 10.5 mg/dL    Total Protein 7.4 6.0 - 8.5 g/dL    Albumin 4.80 3.50 - 5.20 g/dL    ALT (SGPT) 23 1 - 41 U/L    AST (SGOT) 21 1 - 40 U/L    Alkaline Phosphatase 43 39 - 117 U/L    Total Bilirubin 0.9 0.0 - 1.2 mg/dL    eGFR Non African Amer 102 >60 mL/min/1.73    Globulin 2.6 gm/dL    A/G Ratio 1.8 g/dL    BUN/Creatinine Ratio 11.8 7.0 - 25.0    Anion Gap 9.8 5.0 - 15.0 mmol/L   Light Blue Top    Collection Time: 05/25/21 11:16 PM   Result Value Ref Range    Extra Tube hold for add-on    Green Top (Gel)    Collection Time: 05/25/21 11:16 PM   Result Value Ref Range    Extra Tube Hold for add-ons.    Lavender Top    Collection Time: 05/25/21 11:16 PM   Result Value Ref Range    Extra Tube hold for add-on    Gold Top - SST    Collection Time: 05/25/21 11:16 PM   Result Value Ref Range    Extra Tube Hold for add-ons.    CBC Auto Differential    Collection Time: 05/25/21 11:16 PM    Specimen: Blood   Result Value Ref Range    WBC 5.48 3.40 - 10.80 10*3/mm3    RBC 5.32 4.14 - 5.80 10*6/mm3    Hemoglobin 15.3 13.0 - 17.7 g/dL    Hematocrit 44.9 37.5 - 51.0 %    MCV 84.4 79.0 - 97.0 fL    MCH 28.8 26.6 - 33.0 pg    MCHC 34.1 31.5 - 35.7 g/dL    RDW 14.3 12.3 - 15.4 %    RDW-SD 43.8 37.0 - 54.0 fl    MPV 9.6 6.0 - 12.0 fL    Platelets 197 140 - 450 10*3/mm3    Neutrophil % 62.7 42.7 - 76.0 %    Lymphocyte % 25.5 19.6 - 45.3 %    Monocyte % 6.6 5.0 - 12.0 %    Eosinophil % 3.5 0.3 - 6.2 %    Basophil % 1.3 0.0 - 1.5 %    Immature Grans % 0.4 0.0 - 0.5 %    Neutrophils, Absolute 3.44 1.70 - 7.00 10*3/mm3    Lymphocytes, Absolute 1.40 0.70 - 3.10 10*3/mm3     Monocytes, Absolute 0.36 0.10 - 0.90 10*3/mm3    Eosinophils, Absolute 0.19 0.00 - 0.40 10*3/mm3    Basophils, Absolute 0.07 0.00 - 0.20 10*3/mm3    Immature Grans, Absolute 0.02 0.00 - 0.05 10*3/mm3    nRBC 0.0 0.0 - 0.2 /100 WBC   CK    Collection Time: 05/25/21 11:16 PM    Specimen: Blood   Result Value Ref Range    Creatine Kinase 211 (H) 20 - 200 U/L   Protime-INR    Collection Time: 05/25/21 11:16 PM    Specimen: Blood   Result Value Ref Range    Protime 15.3 (H) 11.7 - 14.2 Seconds    INR 1.23 (H) 0.90 - 1.10   Urinalysis With Microscopic If Indicated (No Culture) - Urine, Clean Catch    Collection Time: 05/25/21 11:18 PM    Specimen: Urine, Clean Catch   Result Value Ref Range    Color, UA Yellow Yellow, Straw    Appearance, UA Clear Clear    pH, UA 6.0 5.0 - 8.0    Specific Gravity, UA 1.018 1.005 - 1.030    Glucose, UA Negative Negative    Ketones, UA Negative Negative    Bilirubin, UA Negative Negative    Blood, UA Negative Negative    Protein, UA Trace (A) Negative    Leuk Esterase, UA Negative Negative    Nitrite, UA Negative Negative    Urobilinogen, UA 0.2 E.U./dL 0.2 - 1.0 E.U./dL       Ordered the above labs and independently reviewed the results.        RADIOLOGY  No Radiology Exams Resulted Within Past 24 Hours    I ordered the above noted radiological studies. Reviewed by me and discussed with radiologist.  See dictation for official radiology interpretation.      PROCEDURES    Procedures      MEDICATIONS GIVEN IN ER    Medications - No data to display      PROGRESS, DATA ANALYSIS, CONSULTS, AND MEDICAL DECISION MAKING    All labs have been independently reviewed by me.  All radiology studies have been reviewed by me and discussed with radiologist dictating the report.   EKG's independently viewed and interpreted by me.  Discussion below represents my analysis of pertinent findings related to patient's condition, differential diagnosis, treatment plan and final disposition.    DDx includes but is  not limited to: Rhabdomyolysis, dehydration, blood dyscrasia, renal disease, hepatic disease bilirubinemia.  Will obtain CBC, CMP, UA, CK, PT/INR to further evaluate the patient.  Please see below for MDM and course of care.    ED Course as of May 26 0630   Wed May 26, 2021   0345 Blood, UA: Negative [RC]   0345 Leukocytes, UA: Negative [RC]   0345 Nitrite, UA: Negative [RC]   0345 Creatine Kinase(!): 211 [RC]   0345 Glucose: 95 [RC]   0345 BUN: 10 [RC]   0345 Creatinine: 0.85 [RC]   0345 Sodium: 138 [RC]   0345 Potassium: 3.6 [RC]   0346 ALT (SGPT): 23 [RC]   0346 AST (SGOT): 21 [RC]   0346 Alkaline Phosphatase: 43 [RC]   0346 Total Bilirubin: 0.9 [RC]   0346 INR(!): 1.23 [RC]   0346 WBC: 5.48 [RC]   0346 RBC: 5.32 [RC]   0346 Hemoglobin: 15.3 [RC]   0346 Hematocrit: 44.9 [RC]   0346 Platelets: 197 [RC]   0351 Patient's work-up is unremarkable and his CK is 211.  His AST, ALT, alk phos, T bili are normal, his CBC and platelet count are normal, his INR is 1.23.  His UA is unremarkable.  The patient is not currently in rhabdomyolysis.  However, given the dark-colored urine and the fact that 5 days this past since the patient worked out it may be possible that he had a mild case but it is resolved on its own.  Plan to treat the patient with oral hydration and rest until resolve of his symptoms.  He is to return to the emergency department should he develop any other symptoms and/or have any further concerns.    [RC]      ED Course User Index  [RC] John Huntley III, PA     .    AS OF 06:30 EDT VITALS:    BP - 120/75  HR - 92  TEMP - 97.8 °F (36.6 °C) (Tympanic)  O2 SATS - 95%        DIAGNOSIS  Final diagnoses:   Edema of upper extremity   Ecchymosis   Dark urine         DISPOSITION  DISCHARGE    Patient discharged in stable condition.    Reviewed implications of results, diagnosis, meds, responsibility to follow up, warning signs and symptoms of possible worsening, potential complications and reasons to  return to ER.    Patient/Family voiced understanding of above instructions.    Discussed plan for discharge, as there is no emergent indication for admission. Patient referred to primary care provider for BP management due to today's BP. Pt/family is agreeable and understands need for follow up and repeat testing.  Pt is aware that discharge does not mean that nothing is wrong but it indicates no emergency is present that requires admission and they must continue care with follow-up as given below or physician of their choice.     FOLLOW-UP  Chel Ayala, APRN  [address] [facility] [address] [phone]    Schedule an appointment as soon as possible for a visit in 5 days  For further evaluation and treatment if not well         Medication List      No changes were made to your prescriptions during this visit.                John Huntley III, PA  05/26/21 0600

## 2021-05-26 NOTE — ED PROVIDER NOTES
MD ATTESTATION NOTE    The GEGE and I have discussed this patient's history, physical exam, and treatment plan.  I have reviewed the documentation and personally had a face to face interaction with the patient. I affirm the documentation and agree with the treatment and plan.  The attached note describes my personal findings.      Scott Allen is a 36 y.o. male who presents to the ED c/o soreness and dark urine after working out.  States he has been working out harder than normal.  States he has multiple acquaintances who have gotten rhabdomyolysis in the past.  Concerned that he might have it.  No fevers or chills no chest pain or shortness of breath.      On exam:  No acute distress, normocephalic atraumatic, supple nontender, regular rate and rhythm, clear to auscultation bilaterally, soft nontender nondistended, moving all extremities well    Labs  Recent Results (from the past 24 hour(s))   Comprehensive Metabolic Panel    Collection Time: 05/25/21 11:16 PM    Specimen: Blood   Result Value Ref Range    Glucose 95 65 - 99 mg/dL    BUN 10 6 - 20 mg/dL    Creatinine 0.85 0.76 - 1.27 mg/dL    Sodium 138 136 - 145 mmol/L    Potassium 3.6 3.5 - 5.2 mmol/L    Chloride 101 98 - 107 mmol/L    CO2 27.2 22.0 - 29.0 mmol/L    Calcium 9.5 8.6 - 10.5 mg/dL    Total Protein 7.4 6.0 - 8.5 g/dL    Albumin 4.80 3.50 - 5.20 g/dL    ALT (SGPT) 23 1 - 41 U/L    AST (SGOT) 21 1 - 40 U/L    Alkaline Phosphatase 43 39 - 117 U/L    Total Bilirubin 0.9 0.0 - 1.2 mg/dL    eGFR Non African Amer 102 >60 mL/min/1.73    Globulin 2.6 gm/dL    A/G Ratio 1.8 g/dL    BUN/Creatinine Ratio 11.8 7.0 - 25.0    Anion Gap 9.8 5.0 - 15.0 mmol/L   Light Blue Top    Collection Time: 05/25/21 11:16 PM   Result Value Ref Range    Extra Tube hold for add-on    Green Top (Gel)    Collection Time: 05/25/21 11:16 PM   Result Value Ref Range    Extra Tube Hold for add-ons.    Lavender Top    Collection Time: 05/25/21 11:16 PM   Result Value Ref Range    Extra  Tube hold for add-on    Gold Top - SST    Collection Time: 05/25/21 11:16 PM   Result Value Ref Range    Extra Tube Hold for add-ons.    CBC Auto Differential    Collection Time: 05/25/21 11:16 PM    Specimen: Blood   Result Value Ref Range    WBC 5.48 3.40 - 10.80 10*3/mm3    RBC 5.32 4.14 - 5.80 10*6/mm3    Hemoglobin 15.3 13.0 - 17.7 g/dL    Hematocrit 44.9 37.5 - 51.0 %    MCV 84.4 79.0 - 97.0 fL    MCH 28.8 26.6 - 33.0 pg    MCHC 34.1 31.5 - 35.7 g/dL    RDW 14.3 12.3 - 15.4 %    RDW-SD 43.8 37.0 - 54.0 fl    MPV 9.6 6.0 - 12.0 fL    Platelets 197 140 - 450 10*3/mm3    Neutrophil % 62.7 42.7 - 76.0 %    Lymphocyte % 25.5 19.6 - 45.3 %    Monocyte % 6.6 5.0 - 12.0 %    Eosinophil % 3.5 0.3 - 6.2 %    Basophil % 1.3 0.0 - 1.5 %    Immature Grans % 0.4 0.0 - 0.5 %    Neutrophils, Absolute 3.44 1.70 - 7.00 10*3/mm3    Lymphocytes, Absolute 1.40 0.70 - 3.10 10*3/mm3    Monocytes, Absolute 0.36 0.10 - 0.90 10*3/mm3    Eosinophils, Absolute 0.19 0.00 - 0.40 10*3/mm3    Basophils, Absolute 0.07 0.00 - 0.20 10*3/mm3    Immature Grans, Absolute 0.02 0.00 - 0.05 10*3/mm3    nRBC 0.0 0.0 - 0.2 /100 WBC   CK    Collection Time: 05/25/21 11:16 PM    Specimen: Blood   Result Value Ref Range    Creatine Kinase 211 (H) 20 - 200 U/L   Protime-INR    Collection Time: 05/25/21 11:16 PM    Specimen: Blood   Result Value Ref Range    Protime 15.3 (H) 11.7 - 14.2 Seconds    INR 1.23 (H) 0.90 - 1.10   Urinalysis With Microscopic If Indicated (No Culture) - Urine, Clean Catch    Collection Time: 05/25/21 11:18 PM    Specimen: Urine, Clean Catch   Result Value Ref Range    Color, UA Yellow Yellow, Straw    Appearance, UA Clear Clear    pH, UA 6.0 5.0 - 8.0    Specific Gravity, UA 1.018 1.005 - 1.030    Glucose, UA Negative Negative    Ketones, UA Negative Negative    Bilirubin, UA Negative Negative    Blood, UA Negative Negative    Protein, UA Trace (A) Negative    Leuk Esterase, UA Negative Negative    Nitrite, UA Negative Negative     Urobilinogen, UA 0.2 E.U./dL 0.2 - 1.0 E.U./dL       Radiology  No Radiology Exams Resulted Within Past 24 Hours    Medical Decision Making:  ED Course as of May 26 0359   Wed May 26, 2021   0345 Blood, UA: Negative [RC]   0345 Leukocytes, UA: Negative [RC]   0345 Nitrite, UA: Negative [RC]   0345 Creatine Kinase(!): 211 [RC]   0345 Glucose: 95 [RC]   0345 BUN: 10 [RC]   0345 Creatinine: 0.85 [RC]   0345 Sodium: 138 [RC]   0345 Potassium: 3.6 [RC]   0346 ALT (SGPT): 23 [RC]   0346 AST (SGOT): 21 [RC]   0346 Alkaline Phosphatase: 43 [RC]   0346 Total Bilirubin: 0.9 [RC]   0346 INR(!): 1.23 [RC]   0346 WBC: 5.48 [RC]   0346 RBC: 5.32 [RC]   0346 Hemoglobin: 15.3 [RC]   0346 Hematocrit: 44.9 [RC]   0346 Platelets: 197 [RC]   0351 Patient's work-up is unremarkable and his CK is 211.  His AST, ALT, alk phos, T bili are normal, his CBC and platelet count are normal, his INR is 1.23.  His UA is unremarkable.  The patient is not currently in rhabdomyolysis.  However, given the dark-colored urine and the fact that 5 days this past since the patient worked out it may be possible that he had a mild case but it is resolved on its own.  Plan to treat the patient with oral hydration and rest until resolve of his symptoms.  He is to return to the emergency department should he develop any other symptoms and/or have any further concerns.    [RC]      ED Course User Index  [RC] John Huntley III, PA           PPE: Both the patient and I wore a surgical mask throughout the entire patient encounter. I wore protective goggles.     Diagnosis  Final diagnoses:   Edema of upper extremity   Ecchymosis   Dark urine        Paco Grimm MD  05/26/21 0405

## 2021-05-26 NOTE — ED NOTES
.RN wearing all appropriate PPE during entire encounter with patient.        Behringer, Catherine, RN  05/26/21 7834

## 2021-06-04 ENCOUNTER — TELEPHONE (OUTPATIENT)
Dept: PHARMACY | Facility: HOSPITAL | Age: 36
End: 2021-06-04

## 2021-06-15 ENCOUNTER — TELEPHONE (OUTPATIENT)
Dept: PHARMACY | Facility: HOSPITAL | Age: 36
End: 2021-06-15

## 2021-06-21 ENCOUNTER — TELEPHONE (OUTPATIENT)
Dept: PHARMACY | Facility: HOSPITAL | Age: 36
End: 2021-06-21

## 2021-06-21 DIAGNOSIS — Z79.01 ANTICOAGULATED ON COUMADIN: Primary | ICD-10-CM

## 2021-06-21 NOTE — TELEPHONE ENCOUNTER
Mr. Allen called to report that he is currently working near [facility], and he has requested a standing INR order be entered so that he may visit their outpatient lab for INR monitoring. Order entered. Upon receipt of his INR results, we will complete his anticoagulation management encounters by phone.

## 2021-06-28 ENCOUNTER — TELEPHONE (OUTPATIENT)
Dept: PHARMACY | Facility: HOSPITAL | Age: 36
End: 2021-06-28

## 2021-07-07 DIAGNOSIS — I42.8 OTHER CARDIOMYOPATHY (HCC): ICD-10-CM

## 2021-07-07 RX ORDER — METOPROLOL SUCCINATE 25 MG/1
TABLET, EXTENDED RELEASE ORAL
Qty: 30 TABLET | Refills: 1 | Status: SHIPPED | OUTPATIENT
Start: 2021-07-07

## 2021-08-03 ENCOUNTER — TELEPHONE (OUTPATIENT)
Dept: PHARMACY | Facility: HOSPITAL | Age: 36
End: 2021-08-03

## 2021-08-03 NOTE — TELEPHONE ENCOUNTER
Left message:   Scott Allen, please call the [facility] to schedule an INR. After attempting several calls and trying to reach you by mail, we will be sending a third and final letter.   Failure to respond within 2 weeks will result in your discharge from the medication management clinic. Please call [phone] to schedule an appt.

## 2021-08-17 ENCOUNTER — TELEPHONE (OUTPATIENT)
Dept: PHARMACY | Facility: HOSPITAL | Age: 36
End: 2021-08-17

## 2021-08-17 NOTE — TELEPHONE ENCOUNTER
Left VM requesting return call. If no response, we will plan to discharge from our service, as it has been >4 months since his last INR check, and he has not yet responded to our phone calls/voicemails or 3 no-show letters.

## 2021-08-31 ENCOUNTER — ANTICOAGULATION VISIT (OUTPATIENT)
Dept: PHARMACY | Facility: HOSPITAL | Age: 36
End: 2021-08-31

## 2021-08-31 DIAGNOSIS — Z79.01 ANTICOAGULATED ON COUMADIN: Primary | ICD-10-CM

## 2021-08-31 NOTE — PROGRESS NOTES
This visit is for documentation purposes only: Scott Allen was last seen for INR check on 4/9/21. Since that time, we have been unable to coordinate an INR check, as he has been non-responsive to phone calls, voicemails, and three no-show letters. He will be discharged from the [facility] at this time. It has been a pleasure being part of his care. If needed in the future, we would be happy to again participate in his care upon receiving a new referral.

## 2021-09-19 ENCOUNTER — TELEPHONE (OUTPATIENT)
Dept: CARDIOLOGY | Facility: CLINIC | Age: 36
End: 2021-09-19

## 2021-10-07 NOTE — TELEPHONE ENCOUNTER
Please let the patient know we will need to terminate care.  Please send a certified letter to him stating this and that he has 30 days in which time we will continue to provide care on an emergent basis and will send in a refill for 30 days.

## 2021-10-12 ENCOUNTER — TELEPHONE (OUTPATIENT)
Dept: FAMILY MEDICINE CLINIC | Facility: CLINIC | Age: 36
End: 2021-10-12

## 2021-10-12 NOTE — TELEPHONE ENCOUNTER
Caller: Scott Allen    Relationship: Self    Best call back number: [phone]     What medication are you requesting: ANY    What are your current symptoms: MILD FEVER / CONGESTION     How long have you been experiencing symptoms: 1 WEEK    Have you had these symptoms before:    [x] Yes  [] No    Have you been treated for these symptoms before:   [x] Yes  [] No    If a prescription is needed, what is your preferred pharmacy and phone number: [facility] AT [address] [place] [address] [phone] [place] [phone]      Additional notes: PATIENT STATES THAT THESE SYMPTOMS ARE COMMON FOR HIM AROUND THIS TIME OF YEAR

## 2021-12-16 RX ORDER — WARFARIN SODIUM 5 MG/1
TABLET ORAL
Qty: 105 TABLET | Refills: 1 | OUTPATIENT
Start: 2021-12-16

## 2023-03-06 ENCOUNTER — OFFICE VISIT (OUTPATIENT)
Dept: CARDIOLOGY | Facility: CLINIC | Age: 38
End: 2023-03-06
Payer: COMMERCIAL

## 2023-03-06 ENCOUNTER — LAB VISIT (OUTPATIENT)
Dept: LAB | Facility: HOSPITAL | Age: 38
End: 2023-03-06
Attending: INTERNAL MEDICINE
Payer: COMMERCIAL

## 2023-03-06 VITALS
DIASTOLIC BLOOD PRESSURE: 76 MMHG | SYSTOLIC BLOOD PRESSURE: 126 MMHG | BODY MASS INDEX: 29.23 KG/M2 | HEART RATE: 88 BPM | HEIGHT: 71 IN | WEIGHT: 208.75 LBS

## 2023-03-06 DIAGNOSIS — T66.XXXD RADIATION THERAPY COMPLICATION, SUBSEQUENT ENCOUNTER: ICD-10-CM

## 2023-03-06 DIAGNOSIS — Z79.01 ADEQUATE ANTICOAGULATION ON ANTICOAGULANT THERAPY: ICD-10-CM

## 2023-03-06 DIAGNOSIS — R29.818 TRANSIENT NEUROLOGICAL SYMPTOMS: ICD-10-CM

## 2023-03-06 DIAGNOSIS — I87.1 SVC SYNDROME: ICD-10-CM

## 2023-03-06 DIAGNOSIS — C83.30 DIFFUSE LARGE B-CELL LYMPHOMA, UNSPECIFIED BODY REGION: ICD-10-CM

## 2023-03-06 DIAGNOSIS — I87.1 SVC SYNDROME: Primary | ICD-10-CM

## 2023-03-06 DIAGNOSIS — E78.2 MIXED HYPERLIPIDEMIA: ICD-10-CM

## 2023-03-06 DIAGNOSIS — E03.9 ACQUIRED HYPOTHYROIDISM: ICD-10-CM

## 2023-03-06 PROBLEM — T66.XXXA RADIATION THERAPY COMPLICATION: Status: ACTIVE | Noted: 2023-03-06

## 2023-03-06 LAB
ALBUMIN SERPL BCP-MCNC: 4.6 G/DL (ref 3.5–5.2)
ALP SERPL-CCNC: 48 U/L (ref 55–135)
ALT SERPL W/O P-5'-P-CCNC: 25 U/L (ref 10–44)
ANION GAP SERPL CALC-SCNC: 10 MMOL/L (ref 8–16)
AST SERPL-CCNC: 25 U/L (ref 10–40)
BILIRUB SERPL-MCNC: 0.7 MG/DL (ref 0.1–1)
BUN SERPL-MCNC: 12 MG/DL (ref 6–20)
CALCIUM SERPL-MCNC: 10 MG/DL (ref 8.7–10.5)
CHLORIDE SERPL-SCNC: 105 MMOL/L (ref 95–110)
CHOLEST SERPL-MCNC: 156 MG/DL (ref 120–199)
CHOLEST/HDLC SERPL: 3.7 {RATIO} (ref 2–5)
CO2 SERPL-SCNC: 24 MMOL/L (ref 23–29)
CREAT SERPL-MCNC: 1 MG/DL (ref 0.5–1.4)
EST. GFR  (NO RACE VARIABLE): >60 ML/MIN/1.73 M^2
GLUCOSE SERPL-MCNC: 87 MG/DL (ref 70–110)
HDLC SERPL-MCNC: 42 MG/DL (ref 40–75)
HDLC SERPL: 26.9 % (ref 20–50)
INR PPP: 1.1 (ref 0.8–1.2)
LDLC SERPL CALC-MCNC: 100 MG/DL (ref 63–159)
NONHDLC SERPL-MCNC: 114 MG/DL
POTASSIUM SERPL-SCNC: 3.9 MMOL/L (ref 3.5–5.1)
PROT SERPL-MCNC: 7.9 G/DL (ref 6–8.4)
PROTHROMBIN TIME: 11.5 SEC (ref 9–12.5)
SODIUM SERPL-SCNC: 139 MMOL/L (ref 136–145)
TRIGL SERPL-MCNC: 70 MG/DL (ref 30–150)

## 2023-03-06 PROCEDURE — 99999 PR PBB SHADOW E&M-EST. PATIENT-LVL III: CPT | Mod: PBBFAC,,, | Performed by: INTERNAL MEDICINE

## 2023-03-06 PROCEDURE — 85610 PROTHROMBIN TIME: CPT | Performed by: INTERNAL MEDICINE

## 2023-03-06 PROCEDURE — 80061 LIPID PANEL: CPT | Performed by: INTERNAL MEDICINE

## 2023-03-06 PROCEDURE — 3074F PR MOST RECENT SYSTOLIC BLOOD PRESSURE < 130 MM HG: ICD-10-PCS | Mod: CPTII,S$GLB,, | Performed by: INTERNAL MEDICINE

## 2023-03-06 PROCEDURE — 3008F BODY MASS INDEX DOCD: CPT | Mod: CPTII,S$GLB,, | Performed by: INTERNAL MEDICINE

## 2023-03-06 PROCEDURE — 3078F PR MOST RECENT DIASTOLIC BLOOD PRESSURE < 80 MM HG: ICD-10-PCS | Mod: CPTII,S$GLB,, | Performed by: INTERNAL MEDICINE

## 2023-03-06 PROCEDURE — 1159F PR MEDICATION LIST DOCUMENTED IN MEDICAL RECORD: ICD-10-PCS | Mod: CPTII,S$GLB,, | Performed by: INTERNAL MEDICINE

## 2023-03-06 PROCEDURE — 3078F DIAST BP <80 MM HG: CPT | Mod: CPTII,S$GLB,, | Performed by: INTERNAL MEDICINE

## 2023-03-06 PROCEDURE — 99204 OFFICE O/P NEW MOD 45 MIN: CPT | Mod: S$GLB,,, | Performed by: INTERNAL MEDICINE

## 2023-03-06 PROCEDURE — 3008F PR BODY MASS INDEX (BMI) DOCUMENTED: ICD-10-PCS | Mod: CPTII,S$GLB,, | Performed by: INTERNAL MEDICINE

## 2023-03-06 PROCEDURE — 80053 COMPREHEN METABOLIC PANEL: CPT | Performed by: INTERNAL MEDICINE

## 2023-03-06 PROCEDURE — 99999 PR PBB SHADOW E&M-EST. PATIENT-LVL III: ICD-10-PCS | Mod: PBBFAC,,, | Performed by: INTERNAL MEDICINE

## 2023-03-06 PROCEDURE — 1159F MED LIST DOCD IN RCRD: CPT | Mod: CPTII,S$GLB,, | Performed by: INTERNAL MEDICINE

## 2023-03-06 PROCEDURE — 36415 COLL VENOUS BLD VENIPUNCTURE: CPT | Mod: PO | Performed by: INTERNAL MEDICINE

## 2023-03-06 PROCEDURE — 3074F SYST BP LT 130 MM HG: CPT | Mod: CPTII,S$GLB,, | Performed by: INTERNAL MEDICINE

## 2023-03-06 PROCEDURE — 99204 PR OFFICE/OUTPT VISIT, NEW, LEVL IV, 45-59 MIN: ICD-10-PCS | Mod: S$GLB,,, | Performed by: INTERNAL MEDICINE

## 2023-03-06 RX ORDER — WARFARIN 7.5 MG/1
7.5 TABLET ORAL
COMMUNITY
Start: 2022-09-21 | End: 2023-03-06 | Stop reason: SDUPTHER

## 2023-03-06 RX ORDER — WARFARIN 7.5 MG/1
7.5 TABLET ORAL DAILY
Qty: 90 TABLET | Refills: 3 | Status: SHIPPED | OUTPATIENT
Start: 2023-03-06

## 2023-03-06 RX ORDER — LEVOTHYROXINE SODIUM 88 UG/1
88 TABLET ORAL EVERY MORNING
COMMUNITY
Start: 2022-09-23

## 2023-03-06 RX ORDER — WARFARIN SODIUM 5 MG/1
1 TABLET ORAL
COMMUNITY
Start: 2022-09-21 | End: 2023-03-06 | Stop reason: SDUPTHER

## 2023-03-06 RX ORDER — WARFARIN SODIUM 5 MG/1
5 TABLET ORAL DAILY
Qty: 90 TABLET | Refills: 3 | Status: SHIPPED | OUTPATIENT
Start: 2023-03-06

## 2023-03-06 RX ORDER — DIAZEPAM 2 MG/1
1 TABLET ORAL
COMMUNITY
Start: 2023-01-27

## 2023-03-06 RX ORDER — PREDNISONE 50 MG/1
TABLET ORAL
Qty: 3 TABLET | Refills: 0 | Status: SHIPPED | OUTPATIENT
Start: 2023-03-06

## 2023-03-06 NOTE — PROGRESS NOTES
Subjective:   Chief Complaint: Coronary Artery Disease  Last Clinic Visit: New Patient    History of Present Illness: Johnny Dumont is a 38 y.o. gentleman with diffuse large B-cell lymphoma remission since 2012/2013, history of R-CHOP chemotherapy, history of thoracic radiation, SVC syndrome status post failed stenting 2017, SVC reconstruction with PTFE graft 2018 at [facility], low normal EF 49%, chronic anticoagulation therapy, hypothyroidism s/p radiation, who presents to [facility] cardiology care.  He follows with primary care at [facility], and had cardiology follow-up in [place], but recently moved to the [place] area.  He has been doing reasonably well, denies any transient neurological events over the course the past year.  Compliant with Coumadin although has been intermittent with his INR checks, reports that the majority have been between 2 and 3.  Denies any bleeding.  Denies any evidence of recurrence of SVC syndrome.  He was recommended to be on lifelong anticoagulation from surgeon at [facility] with Coumadin.  Has been over 1 year since most recent CT evaluation of SVC graft, and echocardiogram.  Does have a history of low normal ejection fraction 49%, denies any heart failure symptoms.  No chest pain with regular exercise.    Dx:  Diffuse large B-cell lymphoma diagnosed 2011, completed therapy in 2012/2013  History of R-CHOP chemotherapy  History of thoracic radiation  SVC syndrome status post failed stenting 2017  SVC reconstruction with PTFE graft 2018 at [facility]  Low normal EF 49%  Chronic anticoagulation therapy  Hypothyroidism s/p radiation  Transient neurological events in the setting of poor venous flow    Medications:  Outpatient Encounter Medications as of 3/6/2023   Medication Sig Dispense Refill    diazePAM (VALIUM) 2 MG tablet Take 1 tablet by mouth as needed.      levothyroxine (SYNTHROID) 88 MCG tablet Take 88 mcg by mouth every morning.      [DISCONTINUED] warfarin (COUMADIN) 5 MG tablet  "Take 1 tablet by mouth. 5 days per week      [DISCONTINUED] warfarin (COUMADIN) 7.5 MG tablet Take 7.5 mg by mouth. 2 days per week      predniSONE (DELTASONE) 50 MG Tab Take 1 tablet at 13 hours, 7 hours, and 1 hour before CT scan for a total of 3 tablets 3 tablet 0    warfarin (COUMADIN) 5 MG tablet Take 1 tablet (5 mg total) by mouth Daily. 5 days per week 90 tablet 3    warfarin (COUMADIN) 7.5 MG tablet Take 1 tablet (7.5 mg total) by mouth Daily. 2 days per week 90 tablet 3     No facility-administered encounter medications on file as of 3/6/2023.     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 No distress.   HENT:   Head: Normocephalic and atraumatic.   Mouth/Throat: Mucous membranes are moist.   Cardiovascular: Normal rate, regular rhythm, normal heart sounds and normal pulses. Exam reveals no gallop and no friction rub.   No murmur heard.  Pulmonary/Chest: Effort normal and breath sounds normal. No stridor. No respiratory distress. He has no wheezes. He has no rhonchi. He has no rales. He exhibits no tenderness.   Abdominal: Normal appearance.   Musculoskeletal:      Right lower leg: No edema.      Left lower leg: No edema.   Neurological: He is alert.   Skin: Skin is warm.      TTE:  Pending  Lipids:       Renal:      Liver:      Assessment:     1. SVC syndrome    2. Mixed hyperlipidemia    3. Radiation therapy complication, subsequent encounter    4. Diffuse large B-cell lymphoma, unspecified body region    5. Adequate anticoagulation on anticoagulant therapy    6. Acquired hypothyroidism    7. Transient neurological symptoms      Plan:   1. SVC syndrome  Will enroll him in the Coumadin Clinic continuing vitamin K antagonist for anticoagulation.  Will discuss with our CT surgery colleagues practice patterns for anticoagulation post " PTFE venous graft, whether DOAC could be considered.  Will obtain follow-up surveillance imaging of venous graft with CT chest, with premedication for contrast allergy, which he reports he has tolerated well in the past.  Also repeating echocardiogram given report of low normal EF.    - Echo; Future  - Protime-INR; Future  - Ambulatory referral/consult to Anticoagulation Monitoring (PHARMACIST MANAGED); Future  - Comprehensive Metabolic Panel; Future  - CT Chest With Contrast; Future  - warfarin (COUMADIN) 7.5 MG tablet; Take 1 tablet (7.5 mg total) by mouth Daily. 2 days per week  Dispense: 90 tablet; Refill: 3  - warfarin (COUMADIN) 5 MG tablet; Take 1 tablet (5 mg total) by mouth Daily. 5 days per week  Dispense: 90 tablet; Refill: 3  - predniSONE (DELTASONE) 50 MG Tab; Take 1 tablet at 13 hours, 7 hours, and 1 hour before CT scan for a total of 3 tablets  Dispense: 3 tablet; Refill: 0    2. Mixed hyperlipidemia  Will obtain baseline lipids for routine screening.  - Lipid Panel; Future    3. Radiation therapy complication, subsequent encounter  Discussed potential additional cardiac side effects including diastolic dysfunction, valvular pathology, and coronary artery disease all increased in the setting of radiation, will need to watch cardiac function closely for these issues.  - Echo; Future    4. Diffuse large B-cell lymphoma, unspecified body region  In remission times 10 years defer Heme-Onc surveillance to PCP.    5. Adequate anticoagulation on anticoagulant therapy      6. Acquired hypothyroidism      7. Transient neurological symptoms  He had transient neurological events described as TIAs in the past thought to be secondary to venous congestion from SVC syndrome, interestingly he does describe a few of these episodes after reconstruction, most was over 1 year ago and has not worsened thus will continue to monitor.    Follow up in 1 year, sooner if abnormal imaging or testing.      Linus Penaloza MD  FACC

## 2023-03-09 ENCOUNTER — ANTI-COAG VISIT (OUTPATIENT)
Dept: CARDIOLOGY | Facility: CLINIC | Age: 38
End: 2023-03-09
Payer: COMMERCIAL

## 2023-03-09 ENCOUNTER — PATIENT MESSAGE (OUTPATIENT)
Dept: CARDIOLOGY | Facility: CLINIC | Age: 38
End: 2023-03-09
Payer: COMMERCIAL

## 2023-03-09 DIAGNOSIS — Z79.01 LONG TERM (CURRENT) USE OF ANTICOAGULANTS: Primary | ICD-10-CM

## 2023-03-09 DIAGNOSIS — I87.1 SVC SYNDROME: ICD-10-CM

## 2023-03-09 NOTE — PROGRESS NOTES
Johnny Dumont is referred to the Coumadin Clinic for management of his warfarin. His PMH includes diffuse large B-cell lymphoma remission since 2012/2013, history of R-CHOP chemotherapy, history of thoracic radiation, SVC syndrome status post failed stenting 2017, SVC reconstruction with PTFE graft 2018 at [facility]. He is transferring care to [facility] but unclear where he was being followed previously for warfarin management or what his maintenance dose has been. Will verify what tablet strength patient has and what his previous maintenance dose was.     Of note, depending on CT results, patient may be switching to a DOAC soon.

## 2023-03-10 ENCOUNTER — PATIENT MESSAGE (OUTPATIENT)
Dept: CARDIOLOGY | Facility: CLINIC | Age: 38
End: 2023-03-10
Payer: COMMERCIAL

## 2023-03-11 ENCOUNTER — PATIENT MESSAGE (OUTPATIENT)
Dept: CARDIOLOGY | Facility: CLINIC | Age: 38
End: 2023-03-11
Payer: COMMERCIAL

## 2023-03-11 DIAGNOSIS — I87.1 SVC SYNDROME: Primary | ICD-10-CM

## 2023-03-11 DIAGNOSIS — Z79.01 LONG TERM (CURRENT) USE OF ANTICOAGULANTS: ICD-10-CM

## 2023-03-11 NOTE — TELEPHONE ENCOUNTER
Called patient, discussed results of recent blood work, cholesterol within normal limits.      INR however subtherapeutic.  Discussed in depth indication for anticoagulation, had previously discussed case with cardiothoracic surgery who also agreed long term anti-coagulation indicated.  No guideline recommendation for VKA over DOAC, DOAC newer with less institutional experience however no theoretical or actual contraindication to use.  Especially given INR is subtherapeutic, unknown duration, he reports compliance with coumadin, and also acknowledge he has only had INR checked a handful of times in the past month.      It would be reasonable to change from VKA to DOAC given he is not even therapeutic on VKA to begin with.    Will cost out and start eliquis 5 BID    Linus Penaloza

## 2023-03-12 ENCOUNTER — PATIENT MESSAGE (OUTPATIENT)
Dept: CARDIOLOGY | Facility: CLINIC | Age: 38
End: 2023-03-12
Payer: COMMERCIAL

## 2023-03-13 NOTE — PROGRESS NOTES
Patient to switch to a DOAC per Dr. Penaloza, therefore will not be needing enrollment into clinic. Will close encounter.

## 2023-05-05 DIAGNOSIS — I87.1 SVC SYNDROME: ICD-10-CM

## 2023-05-05 DIAGNOSIS — Z79.01 LONG TERM (CURRENT) USE OF ANTICOAGULANTS: ICD-10-CM

## 2023-07-06 DIAGNOSIS — Z79.01 LONG TERM (CURRENT) USE OF ANTICOAGULANTS: ICD-10-CM

## 2023-07-06 DIAGNOSIS — I87.1 SVC SYNDROME: ICD-10-CM

## (undated) DEVICE — ANTIBACTERIAL UNDYED BRAIDED (POLYGLACTIN 910), SYNTHETIC ABSORBABLE SUTURE: Brand: COATED VICRYL

## (undated) DEVICE — GLV SURG BIOGEL LTX PF 7 1/2

## (undated) DEVICE — ELECTRD BLD EDGE/INSUL1P 2.4X5.1MM STRL

## (undated) DEVICE — SKIN AFFIX SURG ADHESIVE 72/CS 0.55ML: Brand: MEDLINE

## (undated) DEVICE — IRRIGATOR BULB ASEPTO 60CC STRL

## (undated) DEVICE — STPLR SKIN VISISTAT WD 35CT

## (undated) DEVICE — PK ENT MAJ 40